# Patient Record
Sex: MALE
[De-identification: names, ages, dates, MRNs, and addresses within clinical notes are randomized per-mention and may not be internally consistent; named-entity substitution may affect disease eponyms.]

---

## 2019-02-04 ENCOUNTER — HOSPITAL ENCOUNTER (INPATIENT)
Dept: HOSPITAL 42 - ED | Age: 69
LOS: 9 days | Discharge: NURSING, REHAB OR LONG-TERM CARE | DRG: 659 | End: 2019-02-13
Attending: INTERNAL MEDICINE | Admitting: INTERNAL MEDICINE
Payer: MEDICARE

## 2019-02-04 VITALS — BODY MASS INDEX: 33.2 KG/M2

## 2019-02-04 DIAGNOSIS — D47.2: ICD-10-CM

## 2019-02-04 DIAGNOSIS — Z87.891: ICD-10-CM

## 2019-02-04 DIAGNOSIS — D63.1: ICD-10-CM

## 2019-02-04 DIAGNOSIS — Z96.643: ICD-10-CM

## 2019-02-04 DIAGNOSIS — E86.0: ICD-10-CM

## 2019-02-04 DIAGNOSIS — N17.0: Primary | ICD-10-CM

## 2019-02-04 DIAGNOSIS — I50.33: ICD-10-CM

## 2019-02-04 DIAGNOSIS — E87.4: ICD-10-CM

## 2019-02-04 DIAGNOSIS — E83.39: ICD-10-CM

## 2019-02-04 DIAGNOSIS — M16.0: ICD-10-CM

## 2019-02-04 DIAGNOSIS — E55.9: ICD-10-CM

## 2019-02-04 DIAGNOSIS — Z99.2: ICD-10-CM

## 2019-02-04 DIAGNOSIS — N18.3: ICD-10-CM

## 2019-02-04 DIAGNOSIS — I44.0: ICD-10-CM

## 2019-02-04 DIAGNOSIS — I13.0: ICD-10-CM

## 2019-02-04 DIAGNOSIS — E87.5: ICD-10-CM

## 2019-02-04 DIAGNOSIS — R09.02: ICD-10-CM

## 2019-02-04 DIAGNOSIS — N13.2: ICD-10-CM

## 2019-02-04 DIAGNOSIS — D50.9: ICD-10-CM

## 2019-02-04 DIAGNOSIS — R65.10: ICD-10-CM

## 2019-02-04 DIAGNOSIS — N25.81: ICD-10-CM

## 2019-02-04 DIAGNOSIS — E66.9: ICD-10-CM

## 2019-02-04 DIAGNOSIS — E87.6: ICD-10-CM

## 2019-02-04 LAB
ALBUMIN SERPL-MCNC: 3.6 G/DL (ref 3–4.8)
ALBUMIN SERPL-MCNC: 3.8 G/DL (ref 3–4.8)
ALBUMIN/GLOB SERPL: 1 {RATIO} (ref 1.1–1.8)
ALBUMIN/GLOB SERPL: 1 {RATIO} (ref 1.1–1.8)
ALT SERPL-CCNC: 23 U/L (ref 7–56)
ALT SERPL-CCNC: 26 U/L (ref 7–56)
APPEARANCE UR: (no result)
APTT BLD: 38.1 SECONDS (ref 26.9–38.3)
ARTERIAL BLOOD GAS HEMOGLOBIN: 8.7 G/DL (ref 11.7–17.4)
ARTERIAL BLOOD GAS O2 SAT: 96.7 % (ref 95–98)
ARTERIAL BLOOD GAS PCO2: 13 MM/HG (ref 35–45)
ARTERIAL BLOOD GAS TCO2: 3.5 MMOL.L (ref 22–28)
AST SERPL-CCNC: 29 U/L (ref 17–59)
AST SERPL-CCNC: 34 U/L (ref 17–59)
BACTERIA #/AREA URNS HPF: (no result) /HPF
BASE EXCESS BLDV CALC-SCNC: -27.2 MMOL/L (ref 0–2)
BASE EXCESS BLDV CALC-SCNC: -27.7 MMOL/L (ref 0–2)
BASOPHILS # BLD AUTO: 0.03 K/MM3 (ref 0–2)
BASOPHILS # BLD AUTO: 0.05 K/MM3 (ref 0–2)
BASOPHILS NFR BLD: 0.1 % (ref 0–3)
BASOPHILS NFR BLD: 0.2 % (ref 0–3)
BILIRUB UR-MCNC: NEGATIVE MG/DL
BNP SERPL-MCNC: (no result) PG/ML (ref 0–450)
BUN SERPL-MCNC: 230 MG/DL (ref 7–21)
BUN SERPL-MCNC: 232 MG/DL (ref 7–21)
CALCIUM SERPL-MCNC: 6.7 MG/DL (ref 8.4–10.5)
CALCIUM SERPL-MCNC: 7 MG/DL (ref 8.4–10.5)
CK MB SERPL-MCNC: 25 NG/ML (ref 0–3.6)
CK MB%: 5.2 % (ref 2.5–3)
COLOR UR: (no result)
EOSINOPHIL # BLD: 0 10*3/UL (ref 0–0.7)
EOSINOPHIL # BLD: 0 10*3/UL (ref 0–0.7)
EOSINOPHIL NFR BLD: 0 % (ref 1.5–5)
EOSINOPHIL NFR BLD: 0.2 % (ref 1.5–5)
EPI CELLS #/AREA URNS HPF: (no result) /HPF (ref 0–5)
ERYTHROCYTE [DISTWIDTH] IN BLOOD BY AUTOMATED COUNT: 14.5 % (ref 11.5–14.5)
ERYTHROCYTE [DISTWIDTH] IN BLOOD BY AUTOMATED COUNT: 14.6 % (ref 11.5–14.5)
GFR NON-AFRICAN AMERICAN: 3
GFR NON-AFRICAN AMERICAN: 3
GLUCOSE UR STRIP-MCNC: NEGATIVE MG/DL
HCO3 BLDA-SCNC: 3.1 MMOL/L (ref 21–28)
HGB BLD-MCNC: 8.6 G/DL (ref 14–18)
HGB BLD-MCNC: 9 G/DL (ref 14–18)
INHALED O2 CONCENTRATION: 28 %
INR PPP: 1.29
LEUKOCYTE ESTERASE UR-ACNC: (no result) LEU/UL
LYMPHOCYTE: 4 % (ref 22–35)
LYMPHOCYTES # BLD: 1 10*3/UL (ref 1.2–3.4)
LYMPHOCYTES # BLD: 1.6 10*3/UL (ref 1.2–3.4)
LYMPHOCYTES NFR BLD AUTO: 4.4 % (ref 22–35)
LYMPHOCYTES NFR BLD AUTO: 6.7 % (ref 22–35)
MCH RBC QN AUTO: 28.3 PG (ref 25–35)
MCH RBC QN AUTO: 28.5 PG (ref 25–35)
MCHC RBC AUTO-ENTMCNC: 34.1 G/DL (ref 31–37)
MCHC RBC AUTO-ENTMCNC: 34.4 G/DL (ref 31–37)
MCV RBC AUTO: 82.9 FL (ref 80–105)
MCV RBC AUTO: 82.9 FL (ref 80–105)
METAMYELOCYTES NFR BLD: 1 %
MONOCYTE: 6 % (ref 1–6)
MONOCYTES # BLD AUTO: 0.4 10*3/UL (ref 0.1–0.6)
MONOCYTES # BLD AUTO: 0.4 10*3/UL (ref 0.1–0.6)
MONOCYTES NFR BLD: 1.5 % (ref 1–6)
MONOCYTES NFR BLD: 1.6 % (ref 1–6)
NEUTROPHILS NFR BLD AUTO: 85 % (ref 50–70)
NEUTS BAND NFR BLD: 4 % (ref 0–2)
O2 CAP BLDA-SCNC: 12.4 ML/DL (ref 16–24)
O2 CT BLDA-SCNC: 12 ML/DL (ref 15–23)
PH BLDA: 6.98 [PH] (ref 7.35–7.45)
PH BLDV: 6.93 [PH] (ref 7.32–7.43)
PH BLDV: 6.94 [PH] (ref 7.32–7.43)
PH UR STRIP: 6 [PH] (ref 4.7–8)
PLATELET # BLD EST: NORMAL 10*3/UL
PLATELET # BLD: 264 10^3/UL (ref 120–450)
PLATELET # BLD: 268 10^3/UL (ref 120–450)
PMV BLD AUTO: 9.5 FL (ref 7–11)
PMV BLD AUTO: 9.7 FL (ref 7–11)
PO2 BLDA: 142 MM/HG (ref 80–100)
PROT UR STRIP-MCNC: (no result) MG/DL
PROTHROMBIN TIME: 14.3 SECONDS (ref 9.4–12.5)
RBC # BLD AUTO: 3.04 10^6/UL (ref 3.5–6.1)
RBC # BLD AUTO: 3.16 10^6/UL (ref 3.5–6.1)
RBC # UR STRIP: (no result) /UL
SP GR UR STRIP: 1.02 (ref 1–1.03)
TROPONIN I SERPL-MCNC: 0.11 NG/ML
UROBILINOGEN UR STRIP-ACNC: 0.2 E.U./DL
VENOUS BLOOD FIO2: 21 %
VENOUS BLOOD FIO2: 21 %
VENOUS BLOOD GAS PCO2: 16 (ref 40–60)
VENOUS BLOOD GAS PCO2: 17 (ref 40–60)
VENOUS BLOOD GAS PO2: 110 MM/HG (ref 30–55)
VENOUS BLOOD GAS PO2: 215 MM/HG (ref 30–55)
WBC # BLD AUTO: 21.9 10^3/UL (ref 4.5–11)
WBC # BLD AUTO: 23.3 10^3/UL (ref 4.5–11)
WBC #/AREA URNS HPF: (no result) /HPF (ref 0–6)

## 2019-02-04 RX ADMIN — CEFEPIME SCH MLS/HR: 1 INJECTION, SOLUTION INTRAVENOUS at 20:27

## 2019-02-04 NOTE — CP.PCM.CON
<JaysonAnastasia - Last Filed: 02/04/19 17:06>





History of Present Illness





- History of Present Illness


History of Present Illness: 


Anastasia Tyler, PGY-1, ICU Consult Note for Dr. Sow





68 year old male with past medical history of arthritis and is new to [facility] 

presents with shortness of breath for 7-10 days. Patient was found by EMS at 

home with shortness of breath and was hypoxic with O2 saturation of 90% on room 

air. No medications were given prior to patient arrival at ER. Patient has not 

risen from his bed due to the difficulty of getting out of bed since November. 

He ambulates with a walker s/p hip surgery 3 years ago. Patient subsequently 

developed shortness of breath for 7-10 days but patient denied cough, sputum, 

fever, or chills. Patient last urinated this AM. But subsequently, he had the 

urge to urinate but was unable to urinate. He has not urinated since he arrived 

at the hospital. Patient also reports undescribable chest pain for 3 days in his

middle upper chest. Patient is unable to describe the pain or explain what 

improves or worsens the pain. Patient denies diaphoresis, left arm pain, and jaw

pain. He reports nausea and nonbloody, nonbilious vomitingx2 yesterday. Patient 

reports abdominal pain for the past 3 days but denies constipation or diarrhea. 

Patient started eating bur[employer] rio hamburgers prior to abdominal pain onset. 

Patient denies fever, chills, headache, heart palpitations, dysuria, hematuria, 

bilateral lower extremity swelling. 12-point ROS was unremarkable except for 

what was mentioned above in HPI.





BUN/Cr upon presentation was 232/18.1. Potassium was 5.7, Sodium was 131, ABG pH

was 6.98, and ABG pCO2 was 13





PMH: as described above


PSH: hip surgery 3 years ago


FMHx: Mother: ESRD on dialysis


SHx: smoked 1 PPD for 40 years with last cigarette in 2012, prior history of 

alcohol use, no history of recreational drug use


Allergies: NKDA





PMD: Dr. Marshall


Home medications: OTC Allieve and OTC steroid cream for chronic leg rash








Review of Systems





- Review of Systems


Review of Systems: 


except as mentioned in the HPI








Past Patient History





- Past Social History


Smoking Status: Never Smoked





- MUSCULOSKELETAL/RHEUMATOLOGICAL


Hx Arthritis: Yes





- PSYCHIATRIC


Hx Substance Use: No





- ANESTHESIA


Hx Anesthesia: No


Hx Anesthesia Reactions: No


Hx Malignant Hyperthermia: No





Meds


Allergies/Adverse Reactions: 


                                    Allergies











Allergy/AdvReac Type Severity Reaction Status Date / Time


 


No Known Allergies Allergy   Verified 02/04/19 19:36














- Medications


Medications: 


                               Current Medications





Vancomycin HCl (Vancomycin 1gm)  1 gm in 250 mls @ 167 mls/hr IVPB STAT STA; 

Protocol


   Stop: 02/04/19 16:58


Sodium Chloride (Sodium Chloride 0.9%)  1,000 mls @ 999 mls/hr IV .Q1H1M STA


   Stop: 02/04/19 17:04


Calcium Gluconate 1,000 mg/ (Dextrose)  110 mls @ 110 mls/hr IVPB ONCE ONE


   Stop: 02/04/19 17:04











Physical Exam





- Constitutional


Appears: Well, Non-toxic, No Acute Distress





- Head Exam


Head Exam: ATRAUMATIC, NORMAL INSPECTION, NORMOCEPHALIC





- Eye Exam


Eye Exam: EOMI, PERRL





- ENT Exam


ENT Exam: Mucous Membranes Moist





- Respiratory Exam


Respiratory Exam: Wheezes (mild diffuse), Respiratory Distress (tachypneic)





- Cardiovascular Exam


Cardiovascular Exam: REGULAR RHYTHM, RRR





- GI/Abdominal Exam


GI & Abdominal Exam: Normal Bowel Sounds, Soft.  absent: Distended, Tenderness





- Extremities Exam


Extremities exam: Positive for: full ROM





- Neurological Exam


Neurological exam: Alert, CN II-XII Intact, Oriented x3





- Psychiatric Exam


Psychiatric exam: Normal Affect, Normal Mood





- Skin


Additional comments: 


chronic right leg rash with flaky brown lesions








Results





- Vital Signs


Recent Vital Signs: 


                                Last Vital Signs











Temp      


 


Pulse      


 


Resp  22   02/04/19 15:01


 


BP      


 


Pulse Ox  100   02/04/19 15:01














- Labs


Result Diagrams: 


                                 02/04/19 14:50





                                 02/04/19 14:50


Labs: 


                         Laboratory Results - last 24 hr











  02/04/19 02/04/19 02/04/19





  14:50 14:50 14:50


 


WBC  23.3 H  


 


RBC  3.16 L  


 


Hgb  9.0 L  


 


Hct  26.2 L  


 


MCV  82.9  


 


MCH  28.5  


 


MCHC  34.4  


 


RDW  14.5  


 


Plt Count  264  


 


MPV  9.5  


 


Neut % (Auto)  91.4 H  


 


Lymph % (Auto)  6.7 L  


 


Mono % (Auto)  1.5  


 


Eos % (Auto)  0.2 L  


 


Baso % (Auto)  0.2  


 


Lymph # (Auto)  1.6  


 


Mono # (Auto)  0.4  


 


Eos # (Auto)  0.0  


 


Baso # (Auto)  0.05  


 


Absolute Neuts (auto)  21.29 H  


 


Neutrophils % (Manual)  85 H  


 


Band Neutrophils %  4 H  


 


Lymphocytes % (Manual)  4 L  


 


Monocytes % (Manual)  6  


 


Metamyelocytes %  1  


 


Platelet Evaluation  Normal  


 


PT   14.3 H 


 


INR   1.29 


 


APTT   38.1 


 


D-Dimer, Quantitative   5760 H 


 


pCO2   


 


pO2    215 H


 


HCO3   


 


ABG pH   


 


ABG Total CO2   


 


ABG O2 Saturation   


 


ABG O2 Content   


 


ABG Base Excess   


 


ABG Hemoglobin   


 


ABG Carboxyhemoglobin   


 


POC ABG HHb (Measured)   


 


ABG Methemoglobin   


 


ABG O2 Capacity   


 


VBG pH    6.94 L*


 


VBG pCO2    17.0 L*


 


VBG HCO3    3.7 L


 


VBG Total CO2    4.2 L


 


VBG O2 Sat (Calc)    96.8 H


 


VBG Base Excess    -27.2 L


 


VBG Potassium    5.6 H


 


Hgb O2 Saturation   


 


Sodium    125.0 L


 


Chloride    94.0 L


 


Glucose    140 H


 


Lactate    1.2


 


FiO2    21.0


 


Blood Gas Comments   


 


Crit Value Called To    Kasey ramírez


 


Crit Value Called By    Atc


 


Blood Gas Notified Time    1505


 


Potassium   


 


Carbon Dioxide   


 


Anion Gap   


 


BUN   


 


Creatinine   


 


Est GFR ( Amer)   


 


Est GFR (Non-Af Amer)   


 


Random Glucose   


 


Calcium   


 


Magnesium   


 


Total Bilirubin   


 


AST   


 


ALT   


 


Alkaline Phosphatase   


 


Total Creatine Kinase   


 


CK-MB (CK-2)   


 


CK-MB (CK-2) %   


 


NT-Pro-B Natriuret Pep   


 


Total Protein   


 


Albumin   


 


Globulin   


 


Albumin/Globulin Ratio   


 


Venous Blood Potassium    5.6 H














  02/04/19 02/04/19





  14:50 15:15


 


WBC  


 


RBC  


 


Hgb  


 


Hct  


 


MCV  


 


MCH  


 


MCHC  


 


RDW  


 


Plt Count  


 


MPV  


 


Neut % (Auto)  


 


Lymph % (Auto)  


 


Mono % (Auto)  


 


Eos % (Auto)  


 


Baso % (Auto)  


 


Lymph # (Auto)  


 


Mono # (Auto)  


 


Eos # (Auto)  


 


Baso # (Auto)  


 


Absolute Neuts (auto)  


 


Neutrophils % (Manual)  


 


Band Neutrophils %  


 


Lymphocytes % (Manual)  


 


Monocytes % (Manual)  


 


Metamyelocytes %  


 


Platelet Evaluation  


 


PT  


 


INR  


 


APTT  


 


D-Dimer, Quantitative  


 


pCO2   13 L*


 


pO2   142.0 H


 


HCO3   3.1 L*


 


ABG pH   6.98 L*


 


ABG Total CO2   3.5 L


 


ABG O2 Saturation   96.7


 


ABG O2 Content   12.0 L


 


ABG Base Excess   -26.5 L


 


ABG Hemoglobin   8.7 L


 


ABG Carboxyhemoglobin   0 L


 


POC ABG HHb (Measured)   3.3


 


ABG Methemoglobin   1.0


 


ABG O2 Capacity   12.4 L


 


VBG pH  


 


VBG pCO2  


 


VBG HCO3  


 


VBG Total CO2  


 


VBG O2 Sat (Calc)  


 


VBG Base Excess  


 


VBG Potassium  


 


Hgb O2 Saturation   95.6


 


Sodium  131 L 


 


Chloride  96 L 


 


Glucose  


 


Lactate  


 


FiO2   28.0


 


Blood Gas Comments   Walked to er md


 


Crit Value Called To   harish Nunn


 


Crit Value Called By   Ec


 


Blood Gas Notified Time   1522


 


Potassium  5.7 H* 


 


Carbon Dioxide  5 L 


 


Anion Gap  36 H 


 


BUN  232 H* 


 


Creatinine  18.1 H* 


 


Est GFR ( Amer)  3 


 


Est GFR (Non-Af Amer)  3 


 


Random Glucose  136 H 


 


Calcium  7.0 L 


 


Magnesium  1.9 


 


Total Bilirubin  0.8 


 


AST  34 


 


ALT  23 


 


Alkaline Phosphatase  109 


 


Total Creatine Kinase  477 H 


 


CK-MB (CK-2)  25.0 H 


 


CK-MB (CK-2) %  5.2 H 


 


NT-Pro-B Natriuret Pep  67055 H 


 


Total Protein  7.7 


 


Albumin  3.8 


 


Globulin  4.0 


 


Albumin/Globulin Ratio  1.0 L 


 


Venous Blood Potassium  














Assessment & Plan





- Assessment and Plan (Free Text)


Assessment: 


68 year old male with past medical history of arthritis presents with acute 

renal failure like 2/2 to rhabdomyolysis vs. acute tubular necrosis from 

dehydration. Patient is currently receiving IV fluids and sodium bicarbonate 

drip due to renal failure and severe anion gap metabolic acidosis with 

appropriate compensation of respiratory alkalosis.





Plan: 


Neuro: 


  


-AAOx3, no FND, moving extremities past midline.   


-Monitor neuro status.    


-Reorient patient as necessary.   





Cardio:   


Elevated BNP 2/2 to CHF vs. pulmonary etiology


-BNP: 73921


-EKG: NSR with 1st degree AV block


-Echocardiogram: pending


-Troponinx3: pending


-Maintain MAP>65.    


-Monitor for S/S, HD compromise.   





Pulm: 


Tachypnea due to metabolic acidosis


-ABG: pH: 6.98, pO2: 142, pCO2: 13


-CXR: no active disease


-Maintain O2 saturation>90%.   


-Head of bed to 30 degrees  


-Conservative fluid management  


-Maintain oral hygiene  


History of tobacco abuse


-Duonebs Q2 PRN for shortness of breath





GI:   


Diet 


-NPO


GI prophylaxis  


-Protonix 40 mg daily





/Nephro:   


Acute Renal Failure 2/2 to Rhabdomyolysis vs. Acute Tubular Necrosis


-BUN/Cr elevated at 232/18.1 with unknown baseline creatinine


-CK: elevated 477


-No urine output at this time


-Strict I and O with jovel. Measure daily weight


-2 L of NS already administered


-Administer 1 more L of NS bolus


-Administer Sodium bicarbonate drip with 1 amp


-As per Dr. Cardoso, if patient's creatinine does not respond appropriately and 

patient continues to be anuric, he will consider dialysis for tomorrow.


-Follow up reevaluation of CMP at 19:00


-Replete electrolytes as needed.   


-Maintain euvolemia.   


Hyperkalemia


-K: 5.7


-Calcium gluconate, kayexalate, 1 amp of sodium bicarbonate, and insulin 10 U 

with D50 bolus





Endocrinology:   





-Random glucose: 136


-Maintain euglycemia.   





Heme/Onc:   


Anemia


-H/H stable at  9/26.2


-Will check CBC at 19:00 due to likely dehydrated status


-No signs of HD compromise.   


-Continue monitoring H/H  


Elevated D-dimer


-Rule out DVT with bilateral lower extremity ultrasound


-Avoid CTA to evaluate for PE due to acute renal failure


-Avoid V/Q scan to evaluate for PE due to current tachypnea. 


DVT prophylaxis  


-heparin 5000 U Q8





ID:   





-Afebrile, leukocytosis at 23.3


-Blood culture: follow up


-Urine Culture: follow up


-Procalcitonin: follow up


-Lactate: 1.2


-Given one dose of vancomycin and zosyn in the emergency department.


-Monitor for signs and symptoms of infection.   





Patient seen and examined with Dr. Sow.








- Date & Time


Date: 02/04/19


Time: 17:16





<Walt Sow - Last Filed: 02/05/19 09:59>





Meds





- Medications


Medications: 


                               Current Medications





Albuterol/Ipratropium (Duoneb 3 Mg/0.5 Mg (3 Ml) Ud)  3 ml IH Q2H PRN


   PRN Reason: Shortness of Breath


Aspirin (Aspirin Chewable)  81 mg PO DAILY [facility]


   Last Admin: 02/05/19 09:27 Dose:  81 mg


Atorvastatin Calcium (Lipitor)  40 mg PO DIN [facility]


Calcium Acetate (Phoslo)  2,001 mg PO WM [facility]


Darbepoetin Balta (Aranesp)  100 mcg IVP QWK [facility]


Heparin Sodium (Porcine) (Heparin)  5,000 units SC Q8 [facility]; Protocol


   Last Admin: 02/05/19 05:02 Dose:  5,000 units


Sodium Bicarbonate 150 meq/ (Sodium Chloride)  1,150 mls @ 150 mls/hr IV .Q7H40M

[facility]


   Last Admin: 02/05/19 09:43 Dose:  150 mls/hr


Cefepime HCl (Maxipime 1gm)  1 gm in 100 mls @ 100 mls/hr IVPB Q24H [facility]; 

Protocol


   Last Admin: 02/04/19 20:27 Dose:  100 mls/hr


Pantoprazole Sodium (Protonix Inj)  40 mg IVP DAILY [facility]


   Last Admin: 02/05/19 09:02 Dose:  40 mg


Vitamin B Complex/Vit C/Folic Acid (Nephro-Seda)  1 tab PO 0800 [facility]











Results





- Vital Signs


Recent Vital Signs: 


                                Last Vital Signs











Temp  96.6 F L  02/04/19 23:16


 


Pulse  108 H  02/05/19 06:00


 


Resp  23   02/04/19 22:12


 


BP  129/61   02/04/19 17:58


 


Pulse Ox  98   02/04/19 17:58














- Labs


Result Diagrams: 


                                 02/05/19 05:40





                                 02/05/19 05:40


Labs: 


                         Laboratory Results - last 24 hr











  02/04/19 02/04/19 02/04/19





  14:50 14:50 14:50


 


WBC  23.3 H  


 


RBC  3.16 L  


 


Hgb  9.0 L  


 


Hct  26.2 L  


 


MCV  82.9  


 


MCH  28.5  


 


MCHC  34.4  


 


RDW  14.5  


 


Plt Count  264  


 


MPV  9.5  


 


Neut % (Auto)  91.4 H  


 


Lymph % (Auto)  6.7 L  


 


Mono % (Auto)  1.5  


 


Eos % (Auto)  0.2 L  


 


Baso % (Auto)  0.2  


 


Lymph # (Auto)  1.6  


 


Mono # (Auto)  0.4  


 


Eos # (Auto)  0.0  


 


Baso # (Auto)  0.05  


 


Absolute Neuts (auto)  21.29 H  


 


Neutrophils % (Manual)  85 H  


 


Band Neutrophils %  4 H  


 


Lymphocytes % (Manual)  4 L  


 


Monocytes % (Manual)  6  


 


Metamyelocytes %  1  


 


Platelet Evaluation  Normal  


 


PT   14.3 H 


 


INR   1.29 


 


APTT   38.1 


 


D-Dimer, Quantitative   5760 H 


 


pCO2   


 


pO2    215 H


 


HCO3   


 


ABG pH   


 


ABG Total CO2   


 


ABG O2 Saturation   


 


ABG O2 Content   


 


ABG Base Excess   


 


ABG Hemoglobin   


 


ABG Carboxyhemoglobin   


 


POC ABG HHb (Measured)   


 


ABG Methemoglobin   


 


ABG O2 Capacity   


 


VBG pH    6.94 L*


 


VBG pCO2    17.0 L*


 


VBG HCO3    3.7 L


 


VBG Total CO2    4.2 L


 


VBG O2 Sat (Calc)    96.8 H


 


VBG Base Excess    -27.2 L


 


VBG Potassium    5.6 H


 


Hgb O2 Saturation   


 


Sodium    125.0 L


 


Chloride    94.0 L


 


Glucose    140 H


 


Lactate    1.2


 


FiO2    21.0


 


Blood Gas Comments   


 


Crit Value Called To    Kasey ramírez


 


Crit Value Called By    Atc


 


Blood Gas Notified Time    1505


 


Potassium   


 


Carbon Dioxide   


 


Anion Gap   


 


BUN   


 


Creatinine   


 


Est GFR ( Amer)   


 


Est GFR (Non-Af Amer)   


 


Random Glucose   


 


Calcium   


 


Phosphorus   


 


Magnesium   


 


Total Bilirubin   


 


AST   


 


ALT   


 


Alkaline Phosphatase   


 


Total Creatine Kinase   


 


CK-MB (CK-2)   


 


CK-MB (CK-2) %   


 


Troponin I   


 


NT-Pro-B Natriuret Pep   


 


Total Protein   


 


Albumin   


 


Globulin   


 


Albumin/Globulin Ratio   


 


Procalcitonin   


 


Venous Blood Potassium    5.6 H


 


Urine Color   


 


Urine Appearance   


 


Urine pH   


 


Ur Specific Gravity   


 


Urine Protein   


 


Urine Glucose (UA)   


 


Urine Ketones   


 


Urine Blood   


 


Urine Nitrate   


 


Urine Bilirubin   


 


Urine Urobilinogen   


 


Ur Leukocyte Esterase   


 


Urine RBC   


 


Urine WBC   


 


Ur Epithelial Cells   


 


Urine Bacteria   














  02/04/19 02/04/19 02/04/19





  14:50 15:15 17:56


 


WBC   


 


RBC   


 


Hgb   


 


Hct   


 


MCV   


 


MCH   


 


MCHC   


 


RDW   


 


Plt Count   


 


MPV   


 


Neut % (Auto)   


 


Lymph % (Auto)   


 


Mono % (Auto)   


 


Eos % (Auto)   


 


Baso % (Auto)   


 


Lymph # (Auto)   


 


Mono # (Auto)   


 


Eos # (Auto)   


 


Baso # (Auto)   


 


Absolute Neuts (auto)   


 


Neutrophils % (Manual)   


 


Band Neutrophils %   


 


Lymphocytes % (Manual)   


 


Monocytes % (Manual)   


 


Metamyelocytes %   


 


Platelet Evaluation   


 


PT   


 


INR   


 


APTT   


 


D-Dimer, Quantitative   


 


pCO2   13 L* 


 


pO2   142.0 H 


 


HCO3   3.1 L* 


 


ABG pH   6.98 L* 


 


ABG Total CO2   3.5 L 


 


ABG O2 Saturation   96.7 


 


ABG O2 Content   12.0 L 


 


ABG Base Excess   -26.5 L 


 


ABG Hemoglobin   8.7 L 


 


ABG Carboxyhemoglobin   0 L 


 


POC ABG HHb (Measured)   3.3 


 


ABG Methemoglobin   1.0 


 


ABG O2 Capacity   12.4 L 


 


VBG pH   


 


VBG pCO2   


 


VBG HCO3   


 


VBG Total CO2   


 


VBG O2 Sat (Calc)   


 


VBG Base Excess   


 


VBG Potassium   


 


Hgb O2 Saturation   95.6 


 


Sodium  131 L   131 L


 


Chloride  96 L   97 L


 


Glucose   


 


Lactate   


 


FiO2   28.0 


 


Blood Gas Comments   Walked to er md 


 


Crit Value Called To   harish Nunn 


 


Crit Value Called By   Ec 


 


Blood Gas Notified Time   1522 


 


Potassium  5.7 H*   5.4 H


 


Carbon Dioxide  5 L   < 5 L


 


Anion Gap  36 H   34 H


 


BUN  232 H*   230 H*


 


Creatinine  18.1 H*   18.3 H*


 


Est GFR ( Amer)  3   3


 


Est GFR (Non-Af Amer)  3   3


 


Random Glucose  136 H   211 H


 


Calcium  7.0 L   6.7 L*


 


Phosphorus    13.4 H


 


Magnesium  1.9   2.0


 


Total Bilirubin  0.8   0.9


 


AST  34   29


 


ALT  23   26


 


Alkaline Phosphatase  109   105


 


Total Creatine Kinase  477 H  


 


CK-MB (CK-2)  25.0 H  


 


CK-MB (CK-2) %  5.2 H  


 


Troponin I    0.11


 


NT-Pro-B Natriuret Pep  76954 H  


 


Total Protein  7.7   7.3


 


Albumin  3.8   3.6


 


Globulin  4.0   3.7


 


Albumin/Globulin Ratio  1.0 L   1.0 L


 


Procalcitonin   


 


Venous Blood Potassium   


 


Urine Color   


 


Urine Appearance   


 


Urine pH   


 


Ur Specific Gravity   


 


Urine Protein   


 


Urine Glucose (UA)   


 


Urine Ketones   


 


Urine Blood   


 


Urine Nitrate   


 


Urine Bilirubin   


 


Urine Urobilinogen   


 


Ur Leukocyte Esterase   


 


Urine RBC   


 


Urine WBC   


 


Ur Epithelial Cells   


 


Urine Bacteria   














  02/04/19 02/04/19 02/04/19





  18:00 18:14 18:14


 


WBC    21.9 H


 


RBC    3.04 L


 


Hgb    8.6 L


 


Hct    25.2 L


 


MCV    82.9


 


MCH    28.3


 


MCHC    34.1


 


RDW    14.6 H


 


Plt Count    268


 


MPV    9.7


 


Neut % (Auto)    93.9 H


 


Lymph % (Auto)    4.4 L


 


Mono % (Auto)    1.6


 


Eos % (Auto)    0.0 L


 


Baso % (Auto)    0.1


 


Lymph # (Auto)    1.0 L


 


Mono # (Auto)    0.4


 


Eos # (Auto)    0.0


 


Baso # (Auto)    0.03


 


Absolute Neuts (auto)    20.56 H


 


Neutrophils % (Manual)   


 


Band Neutrophils %   


 


Lymphocytes % (Manual)   


 


Monocytes % (Manual)   


 


Metamyelocytes %   


 


Platelet Evaluation   


 


PT   


 


INR   


 


APTT   


 


D-Dimer, Quantitative   


 


pCO2   


 


pO2   110 H 


 


HCO3   


 


ABG pH   


 


ABG Total CO2   


 


ABG O2 Saturation   


 


ABG O2 Content   


 


ABG Base Excess   


 


ABG Hemoglobin   


 


ABG Carboxyhemoglobin   


 


POC ABG HHb (Measured)   


 


ABG Methemoglobin   


 


ABG O2 Capacity   


 


VBG pH   6.93 L* 


 


VBG pCO2   16.0 L* 


 


VBG HCO3   3.4 L 


 


VBG Total CO2   3.9 L 


 


VBG O2 Sat (Calc)   96.2 H 


 


VBG Base Excess   -27.7 L 


 


VBG Potassium   5.6 H 


 


Hgb O2 Saturation   


 


Sodium   126.0 L 


 


Chloride   95.0 L 


 


Glucose   215 H 


 


Lactate   1.4 


 


FiO2   21.0 


 


Blood Gas Comments   


 


Crit Value Called To   Karma victoria 


 


Crit Value Called By   [facility] 


 


Blood Gas Notified Time   1825 


 


Potassium   


 


Carbon Dioxide   


 


Anion Gap   


 


BUN   


 


Creatinine   


 


Est GFR ( Amer)   


 


Est GFR (Non-Af Amer)   


 


Random Glucose   


 


Calcium   


 


Phosphorus   


 


Magnesium   


 


Total Bilirubin   


 


AST   


 


ALT   


 


Alkaline Phosphatase   


 


Total Creatine Kinase   


 


CK-MB (CK-2)   


 


CK-MB (CK-2) %   


 


Troponin I   


 


NT-Pro-B Natriuret Pep   


 


Total Protein   


 


Albumin   


 


Globulin   


 


Albumin/Globulin Ratio   


 


Procalcitonin  1.89 H  


 


Venous Blood Potassium   5.6 H 


 


Urine Color   


 


Urine Appearance   


 


Urine pH   


 


Ur Specific Gravity   


 


Urine Protein   


 


Urine Glucose (UA)   


 


Urine Ketones   


 


Urine Blood   


 


Urine Nitrate   


 


Urine Bilirubin   


 


Urine Urobilinogen   


 


Ur Leukocyte Esterase   


 


Urine RBC   


 


Urine WBC   


 


Ur Epithelial Cells   


 


Urine Bacteria   














  02/04/19 02/04/19 02/05/19





  22:42 23:15 05:40


 


WBC   


 


RBC   


 


Hgb   


 


Hct   


 


MCV   


 


MCH   


 


MCHC   


 


RDW   


 


Plt Count   


 


MPV   


 


Neut % (Auto)   


 


Lymph % (Auto)   


 


Mono % (Auto)   


 


Eos % (Auto)   


 


Baso % (Auto)   


 


Lymph # (Auto)   


 


Mono # (Auto)   


 


Eos # (Auto)   


 


Baso # (Auto)   


 


Absolute Neuts (auto)   


 


Neutrophils % (Manual)   


 


Band Neutrophils %   


 


Lymphocytes % (Manual)   


 


Monocytes % (Manual)   


 


Metamyelocytes %   


 


Platelet Evaluation   


 


PT   


 


INR   


 


APTT   


 


D-Dimer, Quantitative   


 


pCO2   


 


pO2   


 


HCO3   


 


ABG pH   


 


ABG Total CO2   


 


ABG O2 Saturation   


 


ABG O2 Content   


 


ABG Base Excess   


 


ABG Hemoglobin   


 


ABG Carboxyhemoglobin   


 


POC ABG HHb (Measured)   


 


ABG Methemoglobin   


 


ABG O2 Capacity   


 


VBG pH   


 


VBG pCO2   


 


VBG HCO3   


 


VBG Total CO2   


 


VBG O2 Sat (Calc)   


 


VBG Base Excess   


 


VBG Potassium   


 


Hgb O2 Saturation   


 


Sodium    136


 


Chloride    103


 


Glucose   


 


Lactate   


 


FiO2   


 


Blood Gas Comments   


 


Crit Value Called To   


 


Crit Value Called By   


 


Blood Gas Notified Time   


 


Potassium    4.7


 


Carbon Dioxide    7 L D


 


Anion Gap    30 H


 


BUN    234 H*


 


Creatinine    16.6 H*


 


Est GFR ( Amer)    4


 


Est GFR (Non-Af Amer)    3


 


Random Glucose    155 H


 


Calcium    6.5 L*


 


Phosphorus    11.9 H


 


Magnesium    1.7


 


Total Bilirubin    0.8


 


AST    39


 


ALT    23


 


Alkaline Phosphatase    86


 


Total Creatine Kinase   


 


CK-MB (CK-2)   


 


CK-MB (CK-2) %   


 


Troponin I   0.27 H* D  0.95 H* D


 


NT-Pro-B Natriuret Pep   


 


Total Protein    6.6


 


Albumin    3.3


 


Globulin    3.4


 


Albumin/Globulin Ratio    1.0 L


 


Procalcitonin   


 


Venous Blood Potassium   


 


Urine Color  Light yellow  


 


Urine Appearance  Sl cloudy  


 


Urine pH  6.0  


 


Ur Specific Gravity  1.020  


 


Urine Protein  Trace H  


 


Urine Glucose (UA)  Negative  


 


Urine Ketones  Negative  


 


Urine Blood  Moderate H  


 


Urine Nitrate  Negative  


 


Urine Bilirubin  Negative  


 


Urine Urobilinogen  0.2  


 


Ur Leukocyte Esterase  Moderate H  


 


Urine RBC  2 - 5 H  


 


Urine WBC  25 - 30 H  


 


Ur Epithelial Cells  0 - 2  


 


Urine Bacteria  Few  














  02/05/19 02/05/19





  05:40 06:20


 


WBC  12.3 H D 


 


RBC  2.76 L 


 


Hgb  7.7 L 


 


Hct  22.2 L 


 


MCV  80.4 


 


MCH  27.9 


 


MCHC  34.7 


 


RDW  14.3 


 


Plt Count  239 


 


MPV  9.4 


 


Neut % (Auto)  95.8 H 


 


Lymph % (Auto)  3.4 L 


 


Mono % (Auto)  0.7 L 


 


Eos % (Auto)  0.1 L 


 


Baso % (Auto)  0.0 


 


Lymph # (Auto)  0.4 L 


 


Mono # (Auto)  0.1 


 


Eos # (Auto)  0.0 


 


Baso # (Auto)  0.00 


 


Absolute Neuts (auto)  11.79 H 


 


Neutrophils % (Manual)  


 


Band Neutrophils %  


 


Lymphocytes % (Manual)  


 


Monocytes % (Manual)  


 


Metamyelocytes %  


 


Platelet Evaluation  


 


PT  


 


INR  


 


APTT  


 


D-Dimer, Quantitative  


 


pCO2   13 L*


 


pO2   154.0 H


 


HCO3   5.3 L*


 


ABG pH   7.22 L


 


ABG Total CO2   5.7 L


 


ABG O2 Saturation   96.2


 


ABG O2 Content   9.0 L


 


ABG Base Excess   -20.5 L


 


ABG Hemoglobin   6.4 L


 


ABG Carboxyhemoglobin   0 L


 


POC ABG HHb (Measured)   3.8


 


ABG Methemoglobin   0.9


 


ABG O2 Capacity   9.4 L


 


VBG pH  


 


VBG pCO2  


 


VBG HCO3  


 


VBG Total CO2  


 


VBG O2 Sat (Calc)  


 


VBG Base Excess  


 


VBG Potassium  


 


Hgb O2 Saturation   95.4


 


Sodium  


 


Chloride  


 


Glucose  


 


Lactate  


 


FiO2   28.0


 


Blood Gas Comments  


 


Crit Value Called To   [facility]


 


Crit Value Called By   Tee


 


Blood Gas Notified Time   633


 


Potassium  


 


Carbon Dioxide  


 


Anion Gap  


 


BUN  


 


Creatinine  


 


Est GFR ( Amer)  


 


Est GFR (Non-Af Amer)  


 


Random Glucose  


 


Calcium  


 


Phosphorus  


 


Magnesium  


 


Total Bilirubin  


 


AST  


 


ALT  


 


Alkaline Phosphatase  


 


Total Creatine Kinase  


 


CK-MB (CK-2)  


 


CK-MB (CK-2) %  


 


Troponin I  


 


NT-Pro-B Natriuret Pep  


 


Total Protein  


 


Albumin  


 


Globulin  


 


Albumin/Globulin Ratio  


 


Procalcitonin  


 


Venous Blood Potassium  


 


Urine Color  


 


Urine Appearance  


 


Urine pH  


 


Ur Specific Gravity  


 


Urine Protein  


 


Urine Glucose (UA)  


 


Urine Ketones  


 


Urine Blood  


 


Urine Nitrate  


 


Urine Bilirubin  


 


Urine Urobilinogen  


 


Ur Leukocyte Esterase  


 


Urine RBC  


 


Urine WBC  


 


Ur Epithelial Cells  


 


Urine Bacteria  














Addendum


Addendum: 





02/04/19 15:58


ICU Attending Addendum 


Patient seen and examined on 2/4 in the ED. Case reviewed on round with nader conteh.  Agree with resident note above with the following additions/exceptions





68M presents with lethargy and poor PO intake found to be in severe metabolic 

acidosis from acute renal failure likely ATN from dehydration.  Will fluid 

challenege with 3-4L NS and bicarb drip.  He is breathing fast to compensate but

does not appear to be failing. Hold off on intubation.  Nephro on board for 

evaluation of HD for acidosis and hyperK.  Will rule out ACS and sepsis as well.





Repeat VBG in 2 hours


as well as repeat chem


check TNI


pan culture


3L NS at leastbicarb drip


insert jovel


f/u nephro recs


f/u DVT study





Rest of care as above





Walt Sow MD


Pulmonary Critical Care Attending


Critical Care Time: 31 mins


02/04/19 17:59

## 2019-02-04 NOTE — RAD
Date of service: 



02/04/2019



HISTORY:

 sob 



COMPARISON:

No prior. 



FINDINGS:



LUNGS:

No active pulmonary disease.



PLEURA:

No significant pleural effusion identified, no pneumothorax apparent.



CARDIOVASCULAR:

No aortic atherosclerotic calcification present.



Normal cardiac size. Minimal vascular congestion



OSSEOUS STRUCTURES:

No significant abnormalities.



VISUALIZED UPPER ABDOMEN:

Normal.



OTHER FINDINGS:

None.



IMPRESSION:

No active disease.

## 2019-02-04 NOTE — ED PDOC
Arrival/HPI





- General


Chief Complaint: Shortness Of Breath


Historian: Patient





- Critical Care


Critical Care Minutes: 45 minutes





- History of Present Illness


Narrative History of Present Illness (Text): 





02/04/19 14:43


68 year old M w/ no apparent past medical history presenting to the Emergency 

Room for shortness of breath. Per EMS, the patient was found at home having 

difficulty breathing, noted to be hypoxic to 90% on room air with faint wheezes.

He was not given any medication prior to arrival to the Emergency Room. Per EMS,

the patient has a poor history of seeing physicians and had been self-medicating

with topical cortisone for a chronic skin lesion noted to his R calf. He denies 

chest pain, abdominal pain, syncopal episodes, back pain, headache, or 

parasthesias at this time.








No PMD





Time/Duration: Prior to Arrival


Symptom Onset: Sudden


Symptom Course: Unchanged


Activities at Onset: Rest


Context: Home





Past Medical History





- Provider Review


Nursing Documentation Reviewed: Yes





- Travel History


Have you recently traveled outside [place] w/in the past 3 mons?: No





- Musculoskeletal/Rheumatological


Hx Arthritis: Yes





- Psychiatric


Hx Substance Use: No





- Anesthesia


Hx Anesthesia: No


Hx Anesthesia Reactions: No


Hx Malignant Hyperthermia: No





Family/Social History





- Physician Review


Nursing Documentation Reviewed: Yes


Family/Social History: Unknown Family HX


Smoking Status: Never Smoked


Hx Alcohol Use: No


Hx Substance Use: No





Allergies/Home Meds


Allergies/Adverse Reactions: 


Allergies





No Known Allergies Allergy (Verified 02/04/19 19:36)


   








Home Medications: 


                                    Home Meds











 Medication  Instructions  Recorded  Confirmed


 


RX: No Known Home Med  02/04/19 02/04/19














Review of Systems





- Physician Review


All systems were reviewed & negative as marked: Yes





- Review of Systems


Respiratory: SOB, Cough, Wheezing.  absent: Sputum


Cardiovascular: absent: Chest Pain, Palpitations, Edema





Physical Exam


Temperature: Afebrile


Blood Pressure: Normal


Pulse: Regular


Respiratory Rate: Mechanically Ventilated


Appearance: Positive for: Unkept


Pain Distress: None


Mental Status: Positive for: Alert and Oriented X 3





- Systems Exam


Head: Present: Atraumatic, Normocephalic


Pupils: Present: PERRL


Extroacular Muscles: Present: EOMI


Conjunctiva: Present: Normal


Mouth: Present: Dry


Pharnyx: Present: Normal


Respiratory/Chest: Present: Wheezes (faint expiratory wheezes heard in anterior 

lung fields), Tachypneic.  No: Good Air Exchange, Respiratory Distress, 

Retracting


Abdomen: Present: Normal Bowel Sounds.  No: Tenderness, Distention, Peritoneal 

Signs


Lower Extremity: Present: Edema, Capillary Refill < 2 s, Other (Thickened skin 

noted to )


Neurological: Present: GCS=15, Speech Normal (able to speak in full sentences)


Skin: Present: Warm, Dry, Induration ( Keratotic grey lesion noted 

circumferentially to RLE. ).  No: Rashes


Psychiatric: Present: Alert, Oriented x 3, Normal Insight, Normal Concentration





Medical Decision Making


ED Course and Treatment: 





02/04/19 14:51


Impression 


68M  w/ exertional dyspnea





Differential Diagnoses Includes But Is Not Limited To:


--COPD exacerbation


--ACS


--CHF 


--Arrhythmia





Plan


--Labs


--CXR


--IVF


--Blood cultures


--VBG


--Urinalysis


--ICU consult


--ABG


--Zosyn


--Vancomycin


--Renal consult


--Reassess & disposition





Progress Notes


02/04/19 15:30


Labs reviewed with leukocytosis of 23 noted and as well as abg which reveals 

metabolic acidosis with CO2: 13 and pH:6.98. Code Sepsis called. Zosyn and 

Vancomycin ordered. BNP elevated to 14,500. Will give gentle fluids.  D-Dimer 

noted to be elevate, but will hold off on CTPE due to declining renal function 





02/04/19 15:39


Spoke to Dr. NOREEN Sow(intensivist) who is aware and will come see the patient. 

Creatinine/BUN noted to be 18.1 & 232 respectively with worry for renal failure.

Spoke to Dr. GINETTE Hall(PCP) who accepts patient and requests Dr. Cruz(infectious disease) and Dr. Cardoso(nephrology).





02/04/19 15:51


Dr. Sow at the bedside evaluating patient. Spoke to Dr. Cardoso who reccommends 

giving the patient fluids and possibly dialysis tomorrow if kidney profile is 

unchanged. Relayed message to admitting team.














- Lab Interpretations


Lab Results: 














                                 02/04/19 14:50 





                                 02/04/19 14:50 





                                   Lab Results





02/04/19 15:15: pCO2 13 L*, pO2 142.0 H, HCO3 3.1 L*, ABG pH 6.98 L*, ABG Total 

CO2 3.5 L, ABG O2 Saturation 96.7, ABG O2 Content 12.0 L, ABG Base Excess -26.5 

L, ABG Hemoglobin 8.7 L, ABG Carboxyhemoglobin 0 L, POC ABG HHb (Measured) 3.3, 

ABG Methemoglobin 1.0, ABG O2 Capacity 12.4 L, Hgb O2 Saturation 95.6, FiO2 

28.0, Blood Gas Comments Walked to er md, Crit Value Called To harish Nunn, 

Crit Value Called By Ec, Blood Gas Notified Time 1522


02/04/19 14:50: Sodium 131 L, Chloride 96 L, Potassium 5.7 H*, Carbon Dioxide 5 

L, Anion Gap 36 H,  H*, Creatinine 18.1 H*, Est GFR ( Amer) 3, Est

GFR (Non-Af Amer) 3, Random Glucose 136 H, Calcium 7.0 L, Magnesium 1.9, Total 

Bilirubin 0.8, AST 34, ALT 23, Alkaline Phosphatase 109, Total Creatine Kinase 

477 H, CK-MB (CK-2) 25.0 H, CK-MB (CK-2) % 5.2 H, NT-Pro-B Natriuret Pep 82441 H

, Total Protein 7.7, Albumin 3.8, Globulin 4.0, Albumin/Globulin Ratio 1.0 L


02/04/19 14:50: pO2 215 H, VBG pH 6.94 L*, VBG pCO2 17.0 L*, VBG HCO3 3.7 L, VBG

Total CO2 4.2 L, VBG O2 Sat (Calc) 96.8 H, VBG Base Excess -27.2 L, VBG 

Potassium 5.6 H, Sodium 125.0 L, Chloride 94.0 L, Glucose 140 H, Lactate 1.2, 

FiO2 21.0, Crit Value Called To Kasey ramírez, Crit Value Called By Atc, Blood 

Gas Notified Time 1505, Venous Blood Potassium 5.6 H


02/04/19 14:50: PT 14.3 H, INR 1.29, APTT 38.1, D-Dimer, Quantitative 5760 H


02/04/19 14:50: WBC 23.3 H, RBC 3.16 L, Hgb 9.0 L, Hct 26.2 L, MCV 82.9, MCH 

28.5, MCHC 34.4, RDW 14.5, Plt Count 264, MPV 9.5, Neut % (Auto) 91.4 H, Lymph %

(Auto) 6.7 L, Mono % (Auto) 1.5, Eos % (Auto) 0.2 L, Baso % (Auto) 0.2, Lymph # 

(Auto) 1.6, Mono # (Auto) 0.4, Eos # (Auto) 0.0, Baso # (Auto) 0.05, Absolute 

Neuts (auto) 21.29 H, Neutrophils % (Manual) 85 H, Band Neutrophils % 4 H, 

Lymphocytes % (Manual) 4 L, Monocytes % (Manual) 6, Metamyelocytes % 1, Platelet

Evaluation Normal








I have reviewed the lab results: Yes





- RAD Interpretation


Radiology Orders: 











02/04/19 14:13


CHEST PORTABLE [RAD] Stat 














- Medication Orders


Current Medication Orders: 











Albuterol/Ipratropium (Duoneb 3 Mg/0.5 Mg (3 Ml) Ud)  3 ml IH STAT STA


   Stop: 02/04/19 14:13


Methylprednisolone (Solu-Medrol)  125 mg IVP STAT STA


   Stop: 02/04/19 14:14











Disposition/Present on Arrival





- Present on Arrival


Any Indicators Present on Arrival: No


History of DVT/PE: No


History of Uncontrolled Diabetes: No


Urinary Catheter: No


History of Decub. Ulcer: No


History Surgical Site Infection Following: None





- Disposition


Have Diagnosis and Disposition been Completed?: Yes


Diagnosis: 


 Acute renal failure, Sepsis





Disposition: HOSPITALIZED


Disposition Time: 15:51


Patient Plan: ICU


Condition: SERIOUS

## 2019-02-04 NOTE — CARD
--------------- APPROVED REPORT --------------





Date of service: 02/04/2019



EKG Measurement

Heart [id number]

MN [id number]

[id number]

[id number]

[id number]



<Conclusion>

Sinus rhythm with 1st degree AV block

Nonspecific ST and T wave abnormality

Prolonged QT

Abnormal ECG

## 2019-02-04 NOTE — PCM.SEPTIC
Sepsis Progress Note





- Reassessment Type


Date of Evaluation: 02/04/19


Time of Evaluation: 18:30


Reassessment Type: Non-invasive reassessment





- Non Invasive Reassessment


Were the most recent vital sign reviewed: Yes


Vital Sign (Latest): 


                                        











Temp Pulse Resp BP Pulse Ox


 


 94.1 F L  97 H  21   129/61   98 


 


 02/04/19 18:33  02/04/19 17:58  02/04/19 17:58  02/04/19 17:58  02/04/19 17:58








Cardiovascular: Yes: Tachycardia.  No: Gallop, JVD, Murmur, Friction Rub


Respiratory: Yes: Wheezing (expiratory), Other (tachypnic).  No: Accessory 

Muscle Use


Capillary Refill: Normal (Less than 2 sec)


Skin: Normal Color, Warm, Dry

## 2019-02-05 LAB
% IRON SATURATION: 93 % (ref 20–55)
ALBUMIN SERPL-MCNC: 3.3 G/DL (ref 3–4.8)
ALBUMIN/GLOB SERPL: 1 {RATIO} (ref 1.1–1.8)
ALT SERPL-CCNC: 23 U/L (ref 7–56)
ARTERIAL BLOOD GAS HEMOGLOBIN: 6.4 G/DL (ref 11.7–17.4)
ARTERIAL BLOOD GAS O2 SAT: 96.2 % (ref 95–98)
ARTERIAL BLOOD GAS PCO2: 13 MM/HG (ref 35–45)
ARTERIAL BLOOD GAS TCO2: 5.7 MMOL.L (ref 22–28)
AST SERPL-CCNC: 39 U/L (ref 17–59)
BASOPHILS # BLD AUTO: 0 K/MM3 (ref 0–2)
BASOPHILS NFR BLD: 0 % (ref 0–3)
BUN SERPL-MCNC: 234 MG/DL (ref 7–21)
C3 SERPL-MCNC: 93 MG/DL (ref 88–165)
C4 SERPL-MCNC: 36.5 MG/DL (ref 14–44)
CALCIUM SERPL-MCNC: 6.5 MG/DL (ref 8.4–10.5)
CREATININE,RANDOM URINE: 54 MG/DL
EOSINOPHIL # BLD: 0 10*3/UL (ref 0–0.7)
EOSINOPHIL NFR BLD: 0.1 % (ref 1.5–5)
ERYTHROCYTE [DISTWIDTH] IN BLOOD BY AUTOMATED COUNT: 14.3 % (ref 11.5–14.5)
FERRITIN SERPL-MCNC: 489 NG/ML
GFR NON-AFRICAN AMERICAN: 3
HCO3 BLDA-SCNC: 5.3 MMOL/L (ref 21–28)
HEPATITIS B CORE AB: NEGATIVE
HEPATITIS C ANTIBODY: NEGATIVE
HGB BLD-MCNC: 7.7 G/DL (ref 14–18)
INHALED O2 CONCENTRATION: 28 %
IRON SERPL-MCNC: 137 UG/DL (ref 45–180)
LYMPHOCYTES # BLD: 0.4 10*3/UL (ref 1.2–3.4)
LYMPHOCYTES NFR BLD AUTO: 3.4 % (ref 22–35)
MCH RBC QN AUTO: 27.9 PG (ref 25–35)
MCHC RBC AUTO-ENTMCNC: 34.7 G/DL (ref 31–37)
MCV RBC AUTO: 80.4 FL (ref 80–105)
MONOCYTES # BLD AUTO: 0.1 10*3/UL (ref 0.1–0.6)
MONOCYTES NFR BLD: 0.7 % (ref 1–6)
O2 CAP BLDA-SCNC: 9.4 ML/DL (ref 16–24)
O2 CT BLDA-SCNC: 9 ML/DL (ref 15–23)
PH BLDA: 7.22 [PH] (ref 7.35–7.45)
PLATELET # BLD: 239 10^3/UL (ref 120–450)
PMV BLD AUTO: 9.4 FL (ref 7–11)
PO2 BLDA: 154 MM/HG (ref 80–100)
RBC # BLD AUTO: 2.76 10^6/UL (ref 3.5–6.1)
TIBC SERPL-MCNC: 146 UG/DL (ref 261–462)
TRANSFERRIN SERPL-MCNC: 100.88 MG/DL (ref 206–381)
TROPONIN I SERPL-MCNC: 0.95 NG/ML
VIT B12 SERPL-MCNC: 787 PG/ML (ref 239–931)
WBC # BLD AUTO: 12.3 10^3/UL (ref 4.5–11)

## 2019-02-05 PROCEDURE — 5A1D70Z PERFORMANCE OF URINARY FILTRATION, INTERMITTENT, LESS THAN 6 HOURS PER DAY: ICD-10-PCS

## 2019-02-05 PROCEDURE — 05HM33Z INSERTION OF INFUSION DEVICE INTO RIGHT INTERNAL JUGULAR VEIN, PERCUTANEOUS APPROACH: ICD-10-PCS | Performed by: INTERNAL MEDICINE

## 2019-02-05 RX ADMIN — CEFEPIME SCH MLS/HR: 1 INJECTION, SOLUTION INTRAVENOUS at 17:45

## 2019-02-05 RX ADMIN — NYSTATIN SCH APPLIC: 100000 CREAM TOPICAL at 18:51

## 2019-02-05 RX ADMIN — NYSTATIN SCH APPLIC: 100000 CREAM TOPICAL at 13:43

## 2019-02-05 NOTE — [email or web address]
History of Present Illness





- History of Present Illness


History of Present Illness: 


Nephrology Consultation Note:





Assessment: critical


Acute Kidney Injury (N17.9) ? etiology with uremia


Anemia (D64.9), Hyperphosphatemia (E83.39) hyperkalemia


HAGMA iwth respi compensation


obesity


NSAIDs use


ex smoker


mild left hydro with calculus





Plan


Pt is need of acute need for renal replacement therapy initiation at this time. 

hasn't improved with conservative management, d/w pt about dialysis, pros/cons, 

he agreed. Will plan for HD today as ordered 


Maintain hemodynamics stable. Avoid hypotension. Patient not on ACEI/ARB due to 

recent ROXANE


Monitor Input/Output, daily weights and renal function with basic metabolic 

panel


started phos binder, nephrovite and weekly aransep


added flomax. suggest urology eval


may consider kidney biopsy depending upon work up results


bicarb drip changed as ordered





Check urine analysis, spot protein/creatinine, albumin/creatinine ratio, urine 

for eosinophils. renal and bladder sonogram


Check GN work up as C3, C4, GLENN, Anti dsDNA, ASO titers, ANCA (MPO and NM-3), 

Anti GBM antibody, HIV/Hep B and Hep C serology 


Anemia work up with TSAT/Ferritin/Vitamin B12/folate, serum protein electroph

oresis with immunofixation, serum free light chain assay (Kappa/Lambda)


Check for 25-OH vitamin D, iPTH, phosphorus level. 





Dose meds/antibiotics for reduced GFR. Avoid fleets enema/magnesium based l

axatives. Avoid nephrotoxins/NSAIDs/ iodinated contrast (unless needed 

emergently)


Glycemic control


Further work up/management as per primary team





Thanks for allowing me to participate in care of your patient. Will follow 

patient with you. Please call if any Qs. had d/w team


Dr You Cardoso


Office: [phone] Cell: [phone] Fax: [phone]





Chief Complaint; SOB on exertion


Reason for consult: Acute Kidney Injury


HPI: Pt is a 68 M   with hx of HTN obesity hip replacement, hasn't f/up with PMD

for long time presented with complaints of worsening SOB on exertion and feeling

sick. found to have severe ROXANE. pt not aware about kidney disease in past. 

denies change in urine output recently. 


Denies OTC/herbal meds but take NSAIDs as alleve 2 tab daily 


No recent iodinated contrast exposure. No obvious episodes of low BP.


ex smoker. no etoh or drugs


decreased oral intake for last few days





ROS: 


Cardiovascular: No chest pain. 


Pulmonary: c/o shortness of breath 


Gastrointestinal: denies abdominal pain had nausea. No vomiting. 


Genitourinary: No pain while urinating. Denies blood in urine. 


All other negative except as mentioned in HPI. has reduced appetite





Physical Examination:      


General Appearance: ill appearing co-operative . 


Vitals reviewed and noted as below


Head; Atraumatic, normocephalic


ENT: no ulcers no thrush. Tongue is midline/dry. Oropharynx: no rash or ulcers.


EYES: Pupils are equal, round and reactive to light accommodation. Eye muscles 

and extraocular movement intact. Sclera is anicteric.


Neck; supple no lymphadenopathy, no thyromegaly or bruit


Lungs: Increased respiratory rate/effort. Breath sounds bilateral equal and 

clear


Heart: Increased rate. s1s2 normal. No rub or gallop. 


Extremities: no edema. No varicose veins


Neurological: Patient is alert, awake and oriented to person, place and time. No

focal deficit. Strength bilateral appropriate and equal


Skin: Warm and dry. Normal turgor. Palpitation: Normal elasticity for age. 

axillary fungal rash +. Rt lower extremity scaly/scab ulceration


Abdomen: Abdomen is soft. Bowel sounds +. There is no abdominal tenderness, no 

guarding/rigidity no organomegaly


Psych: normal insight and normal affect/mood


MSK: no joint tenderness or swelling. Digits and nails normal, no deformity


: kidney or bladder not palpable





Labs/imaging reviewed.


Past medical history, past surgical history, family history, social history, 

allergy reviewed and noted as below


Family hx: hx of CKD/ESRD and lupus in mother. Rest non-contributory 














Past Patient History





- Past Social History


Smoking Status: Former Smoker





- CARDIAC


Hx Cardiac Disorders: No





- PULMONARY


Hx Respiratory Disorders: Yes (SMOKED CIGARETTES 1 PPD QUIT 2012.)





- NEUROLOGICAL


Hx Neurological Disorder: No





- HEENT


Hx HEENT Problems: No





- RENAL


Hx Chronic Kidney Disease: Yes


Hx Renal Failure: Yes (5-2-19)





- ENDOCRINE/METABOLIC


Hx Endocrine Disorders: No





- HEMATOLOGICAL/ONCOLOGICAL


Hx Blood Disorders: No





- INTEGUMENTARY


Hx Dermatological Problems: Yes


Hx Psoriasis: Yes (BILATERAL LE)





- MUSCULOSKELETAL/RHEUMATOLOGICAL


Hx Musculoskeletal Disorders: Yes


Hx Arthritis: Yes


Hx Falls: Yes





- GASTROINTESTINAL


Hx Gastrointestinal Disorders: No





- GENITOURINARY/GYNECOLOGICAL


Hx Genitourinary Disorders: No





- PSYCHIATRIC


Hx Psychophysiologic Disorder: No


Hx Substance Use: No





- SURGICAL HISTORY


Hx Surgeries: Yes (BILATERAL HIP SX 2016)





- ANESTHESIA


Hx Anesthesia: No


Hx Anesthesia Reactions: No


Hx Malignant Hyperthermia: No





Meds


Allergies/Adverse Reactions: 


                                    Allergies











Allergy/AdvReac Type Severity Reaction Status Date / Time


 


No Known Allergies Allergy   Verified 02/04/19 19:36














- Medications


Medications: 


                               Current Medications





Albuterol/Ipratropium (Duoneb 3 Mg/0.5 Mg (3 Ml) Ud)  3 ml IH Q2H PRN


   PRN Reason: Shortness of Breath


Aspirin (Aspirin Chewable)  81 mg PO DAILY [facility]


   Last Admin: 02/05/19 09:27 Dose:  81 mg


Atorvastatin Calcium (Lipitor)  40 mg PO DIN BRENT


Calcium Acetate (Phoslo)  2,001 mg PO WM [facility]


   Last Admin: 02/05/19 12:07 Dose:  Not Given


Darbepoetin Balta (Aranesp)  100 mcg IVP QWK [facility]


Heparin Sodium (Porcine) (Heparin)  5,000 units SC Q8 [facility]; Protocol


   Last Admin: 02/05/19 05:02 Dose:  5,000 units


Sodium Bicarbonate 150 meq/ (Sodium Chloride)  1,150 mls @ 150 mls/hr IV .Q7H40M

[facility]


   Last Admin: 02/05/19 09:43 Dose:  150 mls/hr


Cefepime HCl (Maxipime 1gm)  1 gm in 100 mls @ 100 mls/hr IVPB Q24H [facility]; 

Protocol


   Last Admin: 02/04/19 20:27 Dose:  100 mls/hr


Nystatin (Mycostatin Cream)  0 ea TOP TID [facility]


Pantoprazole Sodium (Protonix Inj)  40 mg IVP DAILY [facility]


   Last Admin: 02/05/19 09:02 Dose:  40 mg


Vitamin B Complex/Vit C/Folic Acid (Nephro-Seda)  1 tab PO 0800 [facility]











Results





- Vital Signs


Recent Vital Signs: 


                                Last Vital Signs











Temp  96.6 F L  02/04/19 23:16


 


Pulse  108 H  02/05/19 06:00


 


Resp  23   02/04/19 22:12


 


BP  129/61   02/04/19 17:58


 


Pulse Ox  98   02/04/19 17:58














- Labs


Result Diagrams: 


                                 02/05/19 05:40





                                 02/05/19 05:40


Labs: 


                         Laboratory Results - last 24 hr











  02/04/19 02/04/19 02/04/19





  14:50 14:50 14:50


 


WBC  23.3 H  


 


RBC  3.16 L  


 


Hgb  9.0 L  


 


Hct  26.2 L  


 


MCV  82.9  


 


MCH  28.5  


 


MCHC  34.4  


 


RDW  14.5  


 


Plt Count  264  


 


MPV  9.5  


 


Neut % (Auto)  91.4 H  


 


Lymph % (Auto)  6.7 L  


 


Mono % (Auto)  1.5  


 


Eos % (Auto)  0.2 L  


 


Baso % (Auto)  0.2  


 


Lymph # (Auto)  1.6  


 


Mono # (Auto)  0.4  


 


Eos # (Auto)  0.0  


 


Baso # (Auto)  0.05  


 


Absolute Neuts (auto)  21.29 H  


 


Neutrophils % (Manual)  85 H  


 


Band Neutrophils %  4 H  


 


Lymphocytes % (Manual)  4 L  


 


Monocytes % (Manual)  6  


 


Metamyelocytes %  1  


 


Platelet Evaluation  Normal  


 


PT   14.3 H 


 


INR   1.29 


 


APTT   38.1 


 


D-Dimer, Quantitative   5760 H 


 


pCO2   


 


pO2    215 H


 


HCO3   


 


ABG pH   


 


ABG Total CO2   


 


ABG O2 Saturation   


 


ABG O2 Content   


 


ABG Base Excess   


 


ABG Hemoglobin   


 


ABG Carboxyhemoglobin   


 


POC ABG HHb (Measured)   


 


ABG Methemoglobin   


 


ABG O2 Capacity   


 


VBG pH    6.94 L*


 


VBG pCO2    17.0 L*


 


VBG HCO3    3.7 L


 


VBG Total CO2    4.2 L


 


VBG O2 Sat (Calc)    96.8 H


 


VBG Base Excess    -27.2 L


 


VBG Potassium    5.6 H


 


Hgb O2 Saturation   


 


Sodium    125.0 L


 


Chloride    94.0 L


 


Glucose    140 H


 


Lactate    1.2


 


FiO2    21.0


 


Blood Gas Comments   


 


Crit Value Called To    Kasey ramírez


 


Crit Value Called By    [facility]


 


Blood Gas Notified Time    1505


 


Potassium   


 


Carbon Dioxide   


 


Anion Gap   


 


BUN   


 


Creatinine   


 


Est GFR ( Amer)   


 


Est GFR (Non-Af Amer)   


 


Random Glucose   


 


Calcium   


 


Phosphorus   


 


Magnesium   


 


Iron   


 


TIBC   


 


% Saturation   


 


Total Bilirubin   


 


AST   


 


ALT   


 


Alkaline Phosphatase   


 


Total Creatine Kinase   


 


CK-MB (CK-2)   


 


CK-MB (CK-2) %   


 


Troponin I   


 


NT-Pro-B Natriuret Pep   


 


Total Protein   


 


Albumin   


 


Globulin   


 


Albumin/Globulin Ratio   


 


Procalcitonin   


 


Venous Blood Potassium    5.6 H


 


Urine Color   


 


Urine Appearance   


 


Urine pH   


 


Ur Specific Gravity   


 


Urine Protein   


 


Urine Glucose (UA)   


 


Urine Ketones   


 


Urine Blood   


 


Urine Nitrate   


 


Urine Bilirubin   


 


Urine Urobilinogen   


 


Ur Leukocyte Esterase   


 


Urine RBC   


 


Urine WBC   


 


Ur Epithelial Cells   


 


Urine Bacteria   


 


Ur Random Creatinine   


 


Ur Random Sodium   














  02/04/19 02/04/19 02/04/19





  14:50 15:15 17:56


 


WBC   


 


RBC   


 


Hgb   


 


Hct   


 


MCV   


 


MCH   


 


MCHC   


 


RDW   


 


Plt Count   


 


MPV   


 


Neut % (Auto)   


 


Lymph % (Auto)   


 


Mono % (Auto)   


 


Eos % (Auto)   


 


Baso % (Auto)   


 


Lymph # (Auto)   


 


Mono # (Auto)   


 


Eos # (Auto)   


 


Baso # (Auto)   


 


Absolute Neuts (auto)   


 


Neutrophils % (Manual)   


 


Band Neutrophils %   


 


Lymphocytes % (Manual)   


 


Monocytes % (Manual)   


 


Metamyelocytes %   


 


Platelet Evaluation   


 


PT   


 


INR   


 


APTT   


 


D-Dimer, Quantitative   


 


pCO2   13 L* 


 


pO2   142.0 H 


 


HCO3   3.1 L* 


 


ABG pH   6.98 L* 


 


ABG Total CO2   3.5 L 


 


ABG O2 Saturation   96.7 


 


ABG O2 Content   12.0 L 


 


ABG Base Excess   -26.5 L 


 


ABG Hemoglobin   8.7 L 


 


ABG Carboxyhemoglobin   0 L 


 


POC ABG HHb (Measured)   3.3 


 


ABG Methemoglobin   1.0 


 


ABG O2 Capacity   12.4 L 


 


VBG pH   


 


VBG pCO2   


 


VBG HCO3   


 


VBG Total CO2   


 


VBG O2 Sat (Calc)   


 


VBG Base Excess   


 


VBG Potassium   


 


Hgb O2 Saturation   95.6 


 


Sodium  131 L   131 L


 


Chloride  96 L   97 L


 


Glucose   


 


Lactate   


 


FiO2   28.0 


 


Blood Gas Comments   Walked to er md 


 


Crit Value Called To   harish Nunn 


 


Crit Value Called By   Ec 


 


Blood Gas Notified Time   1522 


 


Potassium  5.7 H*   5.4 H


 


Carbon Dioxide  5 L   < 5 L


 


Anion Gap  36 H   34 H


 


BUN  232 H*   230 H*


 


Creatinine  18.1 H*   18.3 H*


 


Est GFR ( Amer)  3   3


 


Est GFR (Non-Af Amer)  3   3


 


Random Glucose  136 H   211 H


 


Calcium  7.0 L   6.7 L*


 


Phosphorus    13.4 H


 


Magnesium  1.9   2.0


 


Iron   


 


TIBC   


 


% Saturation   


 


Total Bilirubin  0.8   0.9


 


AST  34   29


 


ALT  23   26


 


Alkaline Phosphatase  109   105


 


Total Creatine Kinase  477 H  


 


CK-MB (CK-2)  25.0 H  


 


CK-MB (CK-2) %  5.2 H  


 


Troponin I    0.11


 


NT-Pro-B Natriuret Pep  39392 H  


 


Total Protein  7.7   7.3


 


Albumin  3.8   3.6


 


Globulin  4.0   3.7


 


Albumin/Globulin Ratio  1.0 L   1.0 L


 


Procalcitonin   


 


Venous Blood Potassium   


 


Urine Color   


 


Urine Appearance   


 


Urine pH   


 


Ur Specific Gravity   


 


Urine Protein   


 


Urine Glucose (UA)   


 


Urine Ketones   


 


Urine Blood   


 


Urine Nitrate   


 


Urine Bilirubin   


 


Urine Urobilinogen   


 


Ur Leukocyte Esterase   


 


Urine RBC   


 


Urine WBC   


 


Ur Epithelial Cells   


 


Urine Bacteria   


 


Ur Random Creatinine   


 


Ur Random Sodium   














  02/04/19 02/04/19 02/04/19





  18:00 18:14 18:14


 


WBC    21.9 H


 


RBC    3.04 L


 


Hgb    8.6 L


 


Hct    25.2 L


 


MCV    82.9


 


MCH    28.3


 


MCHC    34.1


 


RDW    14.6 H


 


Plt Count    268


 


MPV    9.7


 


Neut % (Auto)    93.9 H


 


Lymph % (Auto)    4.4 L


 


Mono % (Auto)    1.6


 


Eos % (Auto)    0.0 L


 


Baso % (Auto)    0.1


 


Lymph # (Auto)    1.0 L


 


Mono # (Auto)    0.4


 


Eos # (Auto)    0.0


 


Baso # (Auto)    0.03


 


Absolute Neuts (auto)    20.56 H


 


Neutrophils % (Manual)   


 


Band Neutrophils %   


 


Lymphocytes % (Manual)   


 


Monocytes % (Manual)   


 


Metamyelocytes %   


 


Platelet Evaluation   


 


PT   


 


INR   


 


APTT   


 


D-Dimer, Quantitative   


 


pCO2   


 


pO2   110 H 


 


HCO3   


 


ABG pH   


 


ABG Total CO2   


 


ABG O2 Saturation   


 


ABG O2 Content   


 


ABG Base Excess   


 


ABG Hemoglobin   


 


ABG Carboxyhemoglobin   


 


POC ABG HHb (Measured)   


 


ABG Methemoglobin   


 


ABG O2 Capacity   


 


VBG pH   6.93 L* 


 


VBG pCO2   16.0 L* 


 


VBG HCO3   3.4 L 


 


VBG Total CO2   3.9 L 


 


VBG O2 Sat (Calc)   96.2 H 


 


VBG Base Excess   -27.7 L 


 


VBG Potassium   5.6 H 


 


Hgb O2 Saturation   


 


Sodium   126.0 L 


 


Chloride   95.0 L 


 


Glucose   215 H 


 


Lactate   1.4 


 


FiO2   21.0 


 


Blood Gas Comments   


 


Crit Value Called To   Karma victoria 


 


Crit Value Called By   [facility] 


 


Blood Gas Notified Time   1825 


 


Potassium   


 


Carbon Dioxide   


 


Anion Gap   


 


BUN   


 


Creatinine   


 


Est GFR ( Amer)   


 


Est GFR (Non-Af Amer)   


 


Random Glucose   


 


Calcium   


 


Phosphorus   


 


Magnesium   


 


Iron   


 


TIBC   


 


% Saturation   


 


Total Bilirubin   


 


AST   


 


ALT   


 


Alkaline Phosphatase   


 


Total Creatine Kinase   


 


CK-MB (CK-2)   


 


CK-MB (CK-2) %   


 


Troponin I   


 


NT-Pro-B Natriuret Pep   


 


Total Protein   


 


Albumin   


 


Globulin   


 


Albumin/Globulin Ratio   


 


Procalcitonin  1.89 H  


 


Venous Blood Potassium   5.6 H 


 


Urine Color   


 


Urine Appearance   


 


Urine pH   


 


Ur Specific Gravity   


 


Urine Protein   


 


Urine Glucose (UA)   


 


Urine Ketones   


 


Urine Blood   


 


Urine Nitrate   


 


Urine Bilirubin   


 


Urine Urobilinogen   


 


Ur Leukocyte Esterase   


 


Urine RBC   


 


Urine WBC   


 


Ur Epithelial Cells   


 


Urine Bacteria   


 


Ur Random Creatinine   


 


Ur Random Sodium   














  02/04/19 02/04/19 02/05/19





  22:42 23:15 05:40


 


WBC   


 


RBC   


 


Hgb   


 


Hct   


 


MCV   


 


MCH   


 


MCHC   


 


RDW   


 


Plt Count   


 


MPV   


 


Neut % (Auto)   


 


Lymph % (Auto)   


 


Mono % (Auto)   


 


Eos % (Auto)   


 


Baso % (Auto)   


 


Lymph # (Auto)   


 


Mono # (Auto)   


 


Eos # (Auto)   


 


Baso # (Auto)   


 


Absolute Neuts (auto)   


 


Neutrophils % (Manual)   


 


Band Neutrophils %   


 


Lymphocytes % (Manual)   


 


Monocytes % (Manual)   


 


Metamyelocytes %   


 


Platelet Evaluation   


 


PT   


 


INR   


 


APTT   


 


D-Dimer, Quantitative   


 


pCO2   


 


pO2   


 


HCO3   


 


ABG pH   


 


ABG Total CO2   


 


ABG O2 Saturation   


 


ABG O2 Content   


 


ABG Base Excess   


 


ABG Hemoglobin   


 


ABG Carboxyhemoglobin   


 


POC ABG HHb (Measured)   


 


ABG Methemoglobin   


 


ABG O2 Capacity   


 


VBG pH   


 


VBG pCO2   


 


VBG HCO3   


 


VBG Total CO2   


 


VBG O2 Sat (Calc)   


 


VBG Base Excess   


 


VBG Potassium   


 


Hgb O2 Saturation   


 


Sodium    136


 


Chloride    103


 


Glucose   


 


Lactate   


 


FiO2   


 


Blood Gas Comments   


 


Crit Value Called To   


 


Crit Value Called By   


 


Blood Gas Notified Time   


 


Potassium    4.7


 


Carbon Dioxide    7 L D


 


Anion Gap    30 H


 


BUN    234 H*


 


Creatinine    16.6 H*


 


Est GFR ( Amer)    4


 


Est GFR (Non-Af Amer)    3


 


Random Glucose    155 H


 


Calcium    6.5 L*


 


Phosphorus    11.9 H


 


Magnesium    1.7


 


Iron   


 


TIBC   


 


% Saturation   


 


Total Bilirubin    0.8


 


AST    39


 


ALT    23


 


Alkaline Phosphatase    86


 


Total Creatine Kinase   


 


CK-MB (CK-2)   


 


CK-MB (CK-2) %   


 


Troponin I   0.27 H* D  0.95 H* D


 


NT-Pro-B Natriuret Pep   


 


Total Protein    6.6


 


Albumin    3.3


 


Globulin    3.4


 


Albumin/Globulin Ratio    1.0 L


 


Procalcitonin   


 


Venous Blood Potassium   


 


Urine Color  Light yellow  


 


Urine Appearance  Sl cloudy  


 


Urine pH  6.0  


 


Ur Specific Gravity  1.020  


 


Urine Protein  Trace H  


 


Urine Glucose (UA)  Negative  


 


Urine Ketones  Negative  


 


Urine Blood  Moderate H  


 


Urine Nitrate  Negative  


 


Urine Bilirubin  Negative  


 


Urine Urobilinogen  0.2  


 


Ur Leukocyte Esterase  Moderate H  


 


Urine RBC  2 - 5 H  


 


Urine WBC  25 - 30 H  


 


Ur Epithelial Cells  0 - 2  


 


Urine Bacteria  Few  


 


Ur Random Creatinine   


 


Ur Random Sodium   














  02/05/19 02/05/19 02/05/19





  05:40 06:20 10:10


 


WBC  12.3 H D  


 


RBC  2.76 L  


 


Hgb  7.7 L  


 


Hct  22.2 L  


 


MCV  80.4  


 


MCH  27.9  


 


MCHC  34.7  


 


RDW  14.3  


 


Plt Count  239  


 


MPV  9.4  


 


Neut % (Auto)  95.8 H  


 


Lymph % (Auto)  3.4 L  


 


Mono % (Auto)  0.7 L  


 


Eos % (Auto)  0.1 L  


 


Baso % (Auto)  0.0  


 


Lymph # (Auto)  0.4 L  


 


Mono # (Auto)  0.1  


 


Eos # (Auto)  0.0  


 


Baso # (Auto)  0.00  


 


Absolute Neuts (auto)  11.79 H  


 


Neutrophils % (Manual)   


 


Band Neutrophils %   


 


Lymphocytes % (Manual)   


 


Monocytes % (Manual)   


 


Metamyelocytes %   


 


Platelet Evaluation   


 


PT   


 


INR   


 


APTT   


 


D-Dimer, Quantitative   


 


pCO2   13 L* 


 


pO2   154.0 H 


 


HCO3   5.3 L* 


 


ABG pH   7.22 L 


 


ABG Total CO2   5.7 L 


 


ABG O2 Saturation   96.2 


 


ABG O2 Content   9.0 L 


 


ABG Base Excess   -20.5 L 


 


ABG Hemoglobin   6.4 L 


 


ABG Carboxyhemoglobin   0 L 


 


POC ABG HHb (Measured)   3.8 


 


ABG Methemoglobin   0.9 


 


ABG O2 Capacity   9.4 L 


 


VBG pH   


 


VBG pCO2   


 


VBG HCO3   


 


VBG Total CO2   


 


VBG O2 Sat (Calc)   


 


VBG Base Excess   


 


VBG Potassium   


 


Hgb O2 Saturation   95.4 


 


Sodium   


 


Chloride   


 


Glucose   


 


Lactate   


 


FiO2   28.0 


 


Blood Gas Comments   


 


Crit Value Called To   [facility] 


 


Crit Value Called By   Tee 


 


Blood Gas Notified Time   633 


 


Potassium   


 


Carbon Dioxide   


 


Anion Gap   


 


BUN   


 


Creatinine   


 


Est GFR ( Amer)   


 


Est GFR (Non-Af Amer)   


 


Random Glucose   


 


Calcium   


 


Phosphorus   


 


Magnesium   


 


Iron    137


 


TIBC    146 L


 


% Saturation    93 H


 


Total Bilirubin   


 


AST   


 


ALT   


 


Alkaline Phosphatase   


 


Total Creatine Kinase   


 


CK-MB (CK-2)   


 


CK-MB (CK-2) %   


 


Troponin I   


 


NT-Pro-B Natriuret Pep   


 


Total Protein   


 


Albumin   


 


Globulin   


 


Albumin/Globulin Ratio   


 


Procalcitonin   


 


Venous Blood Potassium   


 


Urine Color   


 


Urine Appearance   


 


Urine pH   


 


Ur Specific Gravity   


 


Urine Protein   


 


Urine Glucose (UA)   


 


Urine Ketones   


 


Urine Blood   


 


Urine Nitrate   


 


Urine Bilirubin   


 


Urine Urobilinogen   


 


Ur Leukocyte Esterase   


 


Urine RBC   


 


Urine WBC   


 


Ur Epithelial Cells   


 


Urine Bacteria   


 


Ur Random Creatinine   


 


Ur Random Sodium   














  02/05/19





  11:00


 


WBC 


 


RBC 


 


Hgb 


 


Hct 


 


MCV 


 


MCH 


 


MCHC 


 


RDW 


 


Plt Count 


 


MPV 


 


Neut % (Auto) 


 


Lymph % (Auto) 


 


Mono % (Auto) 


 


Eos % (Auto) 


 


Baso % (Auto) 


 


Lymph # (Auto) 


 


Mono # (Auto) 


 


Eos # (Auto) 


 


Baso # (Auto) 


 


Absolute Neuts (auto) 


 


Neutrophils % (Manual) 


 


Band Neutrophils % 


 


Lymphocytes % (Manual) 


 


Monocytes % (Manual) 


 


Metamyelocytes % 


 


Platelet Evaluation 


 


PT 


 


INR 


 


APTT 


 


D-Dimer, Quantitative 


 


pCO2 


 


pO2 


 


HCO3 


 


ABG pH 


 


ABG Total CO2 


 


ABG O2 Saturation 


 


ABG O2 Content 


 


ABG Base Excess 


 


ABG Hemoglobin 


 


ABG Carboxyhemoglobin 


 


POC ABG HHb (Measured) 


 


ABG Methemoglobin 


 


ABG O2 Capacity 


 


VBG pH 


 


VBG pCO2 


 


VBG HCO3 


 


VBG Total CO2 


 


VBG O2 Sat (Calc) 


 


VBG Base Excess 


 


VBG Potassium 


 


Hgb O2 Saturation 


 


Sodium 


 


Chloride 


 


Glucose 


 


Lactate 


 


FiO2 


 


Blood Gas Comments 


 


Crit Value Called To 


 


Crit Value Called By 


 


Blood Gas Notified Time 


 


Potassium 


 


Carbon Dioxide 


 


Anion Gap 


 


BUN 


 


Creatinine 


 


Est GFR ( Amer) 


 


Est GFR (Non-Af Amer) 


 


Random Glucose 


 


Calcium 


 


Phosphorus 


 


Magnesium 


 


Iron 


 


TIBC 


 


% Saturation 


 


Total Bilirubin 


 


AST 


 


ALT 


 


Alkaline Phosphatase 


 


Total Creatine Kinase 


 


CK-MB (CK-2) 


 


CK-MB (CK-2) % 


 


Troponin I 


 


NT-Pro-B Natriuret Pep 


 


Total Protein 


 


Albumin 


 


Globulin 


 


Albumin/Globulin Ratio 


 


Procalcitonin 


 


Venous Blood Potassium 


 


Urine Color 


 


Urine Appearance 


 


Urine pH 


 


Ur Specific Gravity 


 


Urine Protein 


 


Urine Glucose (UA) 


 


Urine Ketones 


 


Urine Blood 


 


Urine Nitrate 


 


Urine Bilirubin 


 


Urine Urobilinogen 


 


Ur Leukocyte Esterase 


 


Urine RBC 


 


Urine WBC 


 


Ur Epithelial Cells 


 


Urine Bacteria 


 


Ur Random Creatinine  54


 


Ur Random Sodium  96

## 2019-02-05 NOTE — CP.PCM.APN
Subjective





- Date & Time of Evaluation


Date of Evaluation: 02/05/19


Time of Evaluation: 12:45





- Subjective


Subjective: 





Pt. seen receiving HD. 





Review of Systems





- Review of Systems


All systems: reviewed and no additional remarkable complaints except (receiving 

HD.)





Objective





- Vital Signs/Intake and Output


Vital Signs (last 24 hours): 


                                        











Temp Pulse Resp BP Pulse Ox


 


 98.4 F   100 H  22   100/67   93 L


 


 02/05/19 15:30  02/05/19 15:30  02/05/19 15:30  02/05/19 15:30  02/05/19 15:30








Intake and Output: 


                                        











 02/05/19 02/05/19





 06:59 18:59


 


Intake Total 5370 


 


Output Total 700 


 


Balance 4670 














- Medications


Medications: 


                               Current Medications





Albuterol/Ipratropium (Duoneb 3 Mg/0.5 Mg (3 Ml) Ud)  3 ml IH Q2H PRN


   PRN Reason: Shortness of Breath


Aspirin (Aspirin Chewable)  81 mg PO DAILY [facility]


   Last Admin: 02/05/19 09:27 Dose:  81 mg


Atorvastatin Calcium (Lipitor)  40 mg PO DIN [facility]


Calcium Acetate (Phoslo)  2,001 mg PO WM [facility]


   Last Admin: 02/05/19 12:07 Dose:  Not Given


Darbepoetin Balta (Aranesp)  100 mcg IVP QWK [facility]


   Last Admin: 02/05/19 13:50 Dose:  100 mcg


Heparin Sodium (Porcine) (Heparin)  5,000 units SC Q8 [facility]; Protocol


   Last Admin: 02/05/19 13:44 Dose:  5,000 units


Cefepime HCl (Maxipime 1gm)  1 gm in 100 mls @ 100 mls/hr IVPB Q24H [facility]; 

Protocol


   Last Admin: 02/04/19 20:27 Dose:  100 mls/hr


Sodium Bicarbonate 150 meq/ (Dextrose)  1,150 mls @ 50 mls/hr IV .Q23H [facility]


   Stop: 02/07/19 12:31


   Last Admin: 02/05/19 13:40 Dose:  50 mls/hr


Nystatin (Mycostatin Cream)  0 ea TOP TID [facility]


   Last Admin: 02/05/19 13:43 Dose:  1 applic


Pantoprazole Sodium (Protonix Inj)  40 mg IVP DAILY [facility]


   Last Admin: 02/05/19 09:02 Dose:  40 mg


Tamsulosin HCl (Flomax)  0.4 mg PO DAILY [facility]


Vitamin B Complex/Vit C/Folic Acid (Nephro-Seda)  1 tab PO 0800 [facility]











- Labs


Labs: 


                                        





                                 02/05/19 05:40 





                                 02/05/19 05:40 





                                        











PT  14.3 SECONDS (9.4-12.5)  H  02/04/19  14:50    


 


INR  1.29   02/04/19  14:50    


 


APTT  38.1 Seconds (26.9-38.3)   02/04/19  14:50    














- Constitutional


Appears: Non-toxic





- Head Exam


Head Exam: NORMOCEPHALIC





- Eye Exam


Eye Exam: absent: Conjunctival injection, EOMI, Normal appearance, Nystagmus, 

Periorbital swelling, Periorbital tenderness, PERRL, Scleral icterus





- ENT Exam


ENT Exam: absent: Mucous Membranes Dry, Mucous Membranes Moist, Normal Exam, 

Normal External Ear Exam, Normal Oropharynx, TM's Normal Bilaterally





- Neck Exam


Neck Exam: Full ROM





- Respiratory Exam


Respiratory Exam: Decreased Breath Sounds





- Cardiovascular Exam


Cardiovascular Exam: +S1, +S2





- Rectal Exam


Rectal Exam: Deferred





-  Exam


 Exam: absent: Circumcision, NORMAL INSPECTION, Scrotal Swelling, Testicular 

Tenderness, Uretheral Discharge, Testicular Vertical Lie, Bladder Distension


External exam: absent: Ecchymosis, Erythema, Lacerations, Lesions, NORMAL 

EXTERNAL EXAM, Swelling


Speculum exam: absent: Cervical Discharge, Erythema, Foreign Body, Laceration, 

NORMAL SPECULUM EXAM, Tissue, Vaginal Bleeding, Vaginal Discharge


Bimanual exam: absent: Adenexal Mass, Adnexal, Cervical Motion Tendernes, NORMAL

BIMANUAL EXAM, Uterine Enlargement, Uterine Tenderness





Assessment and Plan





- Assessment and Plan (Free Text)


Assessment: 





ITS Impressions





Chest X-Ray  02/04/19 14:13


IMPRESSION:


No active disease. 


 


 








Renal Ultrasound  02/05/19 09:28


IMPRESSION:


11 mm nonobstructing calculus lower pole left kidney.  Otherwise 


unremarkable.


 


 








Abdomen/Pelvis CT  02/05/19 10:24


IMPRESSION:


Obstructing 9 mm mid left ureteral calculus with mild left 


hydronephrosis 8 mm nonobstructing left lower pole renal calculus. 


Minor findings as above. Small bilateral pleural effusion, right 


greater than left. 


 


 








Chest X-Ray  02/05/19 12:34


IMPRESSION:


New right tunneled central venous dialysis catheter otherwise no 


significant change.  


 


 





Assessment:


68 year old male with PMH of arthritis, chronic right leg rash came in to [facility] 

because of worsening shortness of breath for the past week, associated with ge

neralized weakness. The patient has also been having dyspnea on exertion, 

admitted for acute renal failure /sepsis, pending ID and renal consults and 

management.





Plan:





1.  Sepsis   


   - SIRS r/t ARF as per I.D, antibiotics per I.D, UA pos, urine culture 

pending.


   Leukocytosis improving, WBC 23 on admit, today 12.3, Antibx as per I.D, trend

Wbc.





2.  UTI


   Urine culture results pending.


   -Antibx per I.D.





3.  ARF


   Most likely r/t dehydration, vs.recent NSAID use, vs. obstructive, with 

obstructing ureter stone on imaging.


   NA bicarb gtt, and HD as per nephro, trend Bun/Creatinine


   -Monitor Is Os.





4.  Hydronephrosis


   -most likely r/t obstructing left ureter stone, urology consult pending.





5.  Anemia


   -? Anemia, secondary to kidney disease


   monitor h/h, transfuse as per nephro.


   R/o GI source, Stool O.B pending, 





6.  Elevated Trop


   Card. consulted, manage conservatively, no cath at present





7.  Fluid overload, w.elevated BNP, 


   Likely secondary to acute kidney failure


   Monitor I's O.s, dialysis as per nephro

## 2019-02-05 NOTE — CP.CCUPN
<Anastasia Tyler - Last Filed: 02/05/19 13:36>





CCU Subjective





- Physician Review


Subjective (Free Text): 


Anastasia Tyler, PGY-1 ICU Progress Note for Dr. Allen





Patient seen and evaluated at bedside. No acute overnight events. Patient 

reports improved chest pain and shortness of breath. Patient denies headache, 

fever, heart palpitations, left arm pain, jaw pain, nausea, vomiting, 

constipation, abdominal pain, dysuria, hematuria. 12-point ROS was negative 

except for what was mentioned above.











CCU Objective





- Vital Signs / Intake & Output


Intake and Output (Last 8hrs): 


                                 Intake & Output











 02/04/19 02/05/19 02/05/19





 22:59 06:59 14:59


 


Intake Total  5370 


 


Output Total  700 


 


Balance  4670 


 


Weight 225 lb 225 lb 


 


Intake:   


 


  IV  5250 


 


    Left Wrist  1800 


 


    Right Forearm  3450 


 


  Oral  120 


 


Output:   


 


  Urine  700 


 


    Urethral (Jovel)  700 


 


  Stool  0 


 


  Emesis  0 


 


Other:   


 


  Voiding Method Urinal  














- Physical Exam


Head: Positive for: Atraumatic, Normocephalic


Pupils: Positive for: PERRL


Extroacular Muscles: Positive for: EOMI


Conjunctiva: Positive for: Normal


Mouth: Positive for: Dry


Pharnyx: Positive for: Normal


Respiratory/Chest: Positive for: Tachypneic (but improved).  Negative for: Good 

Air Exchange, Respiratory Distress, Retracting


Abdomen: Positive for: Normal Bowel Sounds.  Negative for: Tenderness, 

Distention, Peritoneal Signs


Lower Extremity: Positive for: Edema, Capillary Refill < 2 s, Other (Thickened 

skin noted to right lower extremity)


Neurological: Positive for: GCS=15, Speech Normal (able to speak in full 

sentences)


Skin: Positive for: Warm, Dry, Normal Color.  Negative for: Rashes


Psychiatric: Positive for: Alert, Oriented x 3, Normal Insight, Normal 

Concentration





- Medications


Active Medications: 


Active Medications











Generic Name Dose Route Start Last Admin





  Trade Name Freq  PRN Reason Stop Dose Admin


 


Albuterol/Ipratropium  3 ml  02/04/19 17:04  





  Duoneb 3 Mg/0.5 Mg (3 Ml) Ud  IH   





  Q2H PRN   





  Shortness of Breath   





     





     





     


 


Aspirin  81 mg  02/05/19 10:00  02/05/19 09:27





  Aspirin Chewable  PO   81 mg





  DAILY BRENT   Administration





     





     





     





     


 


Atorvastatin Calcium  40 mg  02/05/19 17:00  





  Lipitor  PO   





  DIN BRENT   





     





     





     





     


 


Calcium Acetate  2,001 mg  02/05/19 12:00  





  Phoslo  PO   





  WM BRENT   





     





     





     





     


 


Darbepoetin Balta  100 mcg  02/05/19 10:00  





  Aranesp  IVP   





  [facility]   





     





     





     





     


 


Heparin Sodium (Porcine)  5,000 units  02/04/19 22:00  02/05/19 05:02





  Heparin  SC   5,000 units





  Q8 BRENT   Administration





     





     





  Protocol   





     


 


Sodium Bicarbonate 150 meq/  1,150 mls @ 150 mls/hr  02/04/19 16:45  02/05/19 

09:43





  Sodium Chloride  IV   150 mls/hr





  .[id number] BRENT   Administration





     





     





     





     


 


Cefepime HCl  1 gm in 100 mls @ 100 mls/hr  02/04/19 18:45  02/04/19 20:27





  Maxipime 1gm  IVPB   100 mls/hr





  Q24H [facility]   Administration





     





     





  Protocol   





     


 


Pantoprazole Sodium  40 mg  02/05/19 10:00  02/05/19 09:02





  Protonix Inj  IVP   40 mg





  DAILY BRENT   Administration





     





     





     





     


 


Vitamin B Complex/Vit C/Folic Acid  1 tab  02/06/19 08:00  





  Nephro-Seda  PO   





  0800 [facility]   





     





     





     





     














- Patient Studies


Lab Studies: 


                                   Lab Studies











  02/05/19 02/05/19 02/05/19 Range/Units





  06:20 05:40 05:40 


 


WBC   12.3 H D   (4.5-11.0)  10^3/uL


 


RBC   2.76 L   (3.5-6.1)  10^6/uL


 


Hgb   7.7 L   (14.0-18.0)  g/dL


 


Hct   22.2 L   (42.0-52.0)  %


 


MCV   80.4   (80.0-105.0)  fl


 


MCH   27.9   (25.0-35.0)  pg


 


MCHC   34.7   (31.0-37.0)  g/dl


 


RDW   14.3   (11.5-14.5)  %


 


Plt Count   239   (120.0-450.0)  10^3/uL


 


MPV   9.4   (7.0-11.0)  fl


 


Neut % (Auto)   95.8 H   (50.0-68.0)  %


 


Lymph % (Auto)   3.4 L   (22.0-35.0)  %


 


Mono % (Auto)   0.7 L   (1.0-6.0)  %


 


Eos % (Auto)   0.1 L   (1.5-5.0)  %


 


Baso % (Auto)   0.0   (0.0-3.0)  %


 


Lymph # (Auto)   0.4 L   (1.2-3.4)  


 


Mono # (Auto)   0.1   (0.1-0.6)  


 


Eos # (Auto)   0.0   (0.0-0.7)  


 


Baso # (Auto)   0.00   (0.0-2.0)  K/mm3


 


Absolute Neuts (auto)   11.79 H   (1.4-6.5)  


 


Neutrophils % (Manual)     (50.0-70.0)  %


 


Band Neutrophils %     (0-2)  %


 


Lymphocytes % (Manual)     (22.0-35.0)  %


 


Monocytes % (Manual)     (1.0-6.0)  %


 


Metamyelocytes %     %


 


Platelet Evaluation     (NORMAL)  


 


PT     (9.4-12.5)  SECONDS


 


INR     


 


APTT     (26.9-38.3)  Seconds


 


D-Dimer, Quantitative     (0-243)  ng/mlDDU


 


pCO2  13 L*    (35-45)  mm/Hg


 


pO2  154.0 H    (30-55)  mm/Hg


 


HCO3  5.3 L*    (21-28)  mmol/L


 


ABG pH  7.22 L    (7.35-7.45)  


 


ABG Total CO2  5.7 L    (22-28)  mmol.L


 


ABG O2 Saturation  96.2    (95-98)  %


 


ABG O2 Content  9.0 L    (15-23)  ML/dl


 


ABG Base Excess  -20.5 L    (-2.0-3.0)  mmol/L


 


ABG Hemoglobin  6.4 L    (11.7-17.4)  g/dL


 


ABG Carboxyhemoglobin  0 L    (0.5-1.5)  %


 


POC ABG HHb (Measured)  3.8    (0-5)  %


 


ABG Methemoglobin  0.9    (0.0-3.0)  %


 


ABG O2 Capacity  9.4 L    (16-24)  mL/dl


 


VBG pH     (7.32-7.43)  


 


VBG pCO2     (40-60)  


 


VBG HCO3     (21-28)  mmol/l


 


VBG Total CO2     (22-28)  mmol.L


 


VBG O2 Sat (Calc)     (40-65)  %


 


VBG Base Excess     (0.0-2.0)  mmol/L


 


VBG Potassium     (3.6-5.2)  mmol/L


 


Hgb O2 Saturation  95.4    (95.0-98.0)  %


 


Sodium    136  (132-148)  mmol/L


 


Chloride    103  ()  mmol/L


 


Glucose     ()  mg/dl


 


Lactate     (0.7-2.1)  mmol/L


 


FiO2  28.0    %


 


Blood Gas Comments     


 


Crit Value Called To  Vickey moss rn icu    


 


Crit Value Called By  Tee    


 


Blood Gas Notified Time  633    


 


Potassium    4.7  (3.6-5.0)  mmol/L


 


Carbon Dioxide    7 L D  (21-33)  mmol/L


 


Anion Gap    30 H  (10-20)  


 


BUN    234 H*  (7-21)  mg/dL


 


Creatinine    16.6 H*  (0.8-1.5)  mg/dl


 


Est GFR (African Amer)    4  


 


Est GFR (Non-Af Amer)    3  


 


Random Glucose    155 H  ()  mg/dL


 


Calcium    6.5 L*  (8.4-10.5)  mg/dL


 


Phosphorus    11.9 H  (2.5-4.5)  mg/dL


 


Magnesium    1.7  (1.7-2.2)  mg/dL


 


Total Bilirubin    0.8  (0.2-1.3)  mg/dL


 


AST    39  (17-59)  U/L


 


ALT    23  (7-56)  U/L


 


Alkaline Phosphatase    86  ()  U/L


 


Total Creatine Kinase     ()  U/L


 


CK-MB (CK-2)     (0.0-3.6)  ng/mL


 


CK-MB (CK-2) %     (2.5-3.0)  %


 


Troponin I    0.95 H* D  ng/mL


 


NT-Pro-B Natriuret Pep     (0-450)  pg/mL


 


Total Protein    6.6  (5.8-8.3)  g/dL


 


Albumin    3.3  (3.0-4.8)  g/dL


 


Globulin    3.4  gm/dL


 


Albumin/Globulin Ratio    1.0 L  (1.1-1.8)  


 


Procalcitonin     (0.19-0.49)  NG/ML


 


Venous Blood Potassium     (3.6-5.2)  mmol/L


 


Urine Color     (YELLOW)  


 


Urine Appearance     (CLEAR)  


 


Urine pH     (4.7-8.0)  


 


Ur Specific Gravity     (1.005-1.035)  


 


Urine Protein     (<30 mg/dL)  mg/dL


 


Urine Glucose (UA)     (NEGATIVE)  mg/dL


 


Urine Ketones     (NEGATIVE)  mg/dL


 


Urine Blood     (NEGATIVE)  


 


Urine Nitrate     (NEGATIVE)  


 


Urine Bilirubin     (NEGATIVE)  


 


Urine Urobilinogen     (<1 E.U./dL)  E.U./dL


 


Ur Leukocyte Esterase     (NEGATIVE)  Virgie/uL


 


Urine RBC     (0-2)  /hpf


 


Urine WBC     (0-6)  /hpf


 


Ur Epithelial Cells     (0-5)  /hpf


 


Urine Bacteria     (NONE)  /hpf














  02/04/19 02/04/19 02/04/19 Range/Units





  23:15 22:42 18:14 


 


WBC    21.9 H  (4.5-11.0)  10^3/uL


 


RBC    3.04 L  (3.5-6.1)  10^6/uL


 


Hgb    8.6 L  (14.0-18.0)  g/dL


 


Hct    25.2 L  (42.0-52.0)  %


 


MCV    82.9  (80.0-105.0)  fl


 


MCH    28.3  (25.0-35.0)  pg


 


MCHC    34.1  (31.0-37.0)  g/dl


 


RDW    14.6 H  (11.5-14.5)  %


 


Plt Count    268  (120.0-450.0)  10^3/uL


 


MPV    9.7  (7.0-11.0)  fl


 


Neut % (Auto)    93.9 H  (50.0-68.0)  %


 


Lymph % (Auto)    4.4 L  (22.0-35.0)  %


 


Mono % (Auto)    1.6  (1.0-6.0)  %


 


Eos % (Auto)    0.0 L  (1.5-5.0)  %


 


Baso % (Auto)    0.1  (0.0-3.0)  %


 


Lymph # (Auto)    1.0 L  (1.2-3.4)  


 


Mono # (Auto)    0.4  (0.1-0.6)  


 


Eos # (Auto)    0.0  (0.0-0.7)  


 


Baso # (Auto)    0.03  (0.0-2.0)  K/mm3


 


Absolute Neuts (auto)    20.56 H  (1.4-6.5)  


 


Neutrophils % (Manual)     (50.0-70.0)  %


 


Band Neutrophils %     (0-2)  %


 


Lymphocytes % (Manual)     (22.0-35.0)  %


 


Monocytes % (Manual)     (1.0-6.0)  %


 


Metamyelocytes %     %


 


Platelet Evaluation     (NORMAL)  


 


PT     (9.4-12.5)  SECONDS


 


INR     


 


APTT     (26.9-38.3)  Seconds


 


D-Dimer, Quantitative     (0-243)  ng/mlDDU


 


pCO2     (35-45)  mm/Hg


 


pO2     (30-55)  mm/Hg


 


HCO3     (21-28)  mmol/L


 


ABG pH     (7.35-7.45)  


 


ABG Total CO2     (22-28)  mmol.L


 


ABG O2 Saturation     (95-98)  %


 


ABG O2 Content     (15-23)  ML/dl


 


ABG Base Excess     (-2.0-3.0)  mmol/L


 


ABG Hemoglobin     (11.7-17.4)  g/dL


 


ABG Carboxyhemoglobin     (0.5-1.5)  %


 


POC ABG HHb (Measured)     (0-5)  %


 


ABG Methemoglobin     (0.0-3.0)  %


 


ABG O2 Capacity     (16-24)  mL/dl


 


VBG pH     (7.32-7.43)  


 


VBG pCO2     (40-60)  


 


VBG HCO3     (21-28)  mmol/l


 


VBG Total CO2     (22-28)  mmol.L


 


VBG O2 Sat (Calc)     (40-65)  %


 


VBG Base Excess     (0.0-2.0)  mmol/L


 


VBG Potassium     (3.6-5.2)  mmol/L


 


Hgb O2 Saturation     (95.0-98.0)  %


 


Sodium     (132-148)  mmol/L


 


Chloride     ()  mmol/L


 


Glucose     ()  mg/dl


 


Lactate     (0.7-2.1)  mmol/L


 


FiO2     %


 


Blood Gas Comments     


 


Crit Value Called To     


 


Crit Value Called By     


 


Blood Gas Notified Time     


 


Potassium     (3.6-5.0)  mmol/L


 


Carbon Dioxide     (21-33)  mmol/L


 


Anion Gap     (10-20)  


 


BUN     (7-21)  mg/dL


 


Creatinine     (0.8-1.5)  mg/dl


 


Est GFR (African Amer)     


 


Est GFR (Non-Af Amer)     


 


Random Glucose     ()  mg/dL


 


Calcium     (8.4-10.5)  mg/dL


 


Phosphorus     (2.5-4.5)  mg/dL


 


Magnesium     (1.7-2.2)  mg/dL


 


Total Bilirubin     (0.2-1.3)  mg/dL


 


AST     (17-59)  U/L


 


ALT     (7-56)  U/L


 


Alkaline Phosphatase     ()  U/L


 


Total Creatine Kinase     ()  U/L


 


CK-MB (CK-2)     (0.0-3.6)  ng/mL


 


CK-MB (CK-2) %     (2.5-3.0)  %


 


Troponin I  0.27 H* D    ng/mL


 


NT-Pro-B Natriuret Pep     (0-450)  pg/mL


 


Total Protein     (5.8-8.3)  g/dL


 


Albumin     (3.0-4.8)  g/dL


 


Globulin     gm/dL


 


Albumin/Globulin Ratio     (1.1-1.8)  


 


Procalcitonin     (0.19-0.49)  NG/ML


 


Venous Blood Potassium     (3.6-5.2)  mmol/L


 


Urine Color   Light yellow   (YELLOW)  


 


Urine Appearance   Sl cloudy   (CLEAR)  


 


Urine pH   6.0   (4.7-8.0)  


 


Ur Specific Gravity   1.020   (1.005-1.035)  


 


Urine Protein   Trace H   (<30 mg/dL)  mg/dL


 


Urine Glucose (UA)   Negative   (NEGATIVE)  mg/dL


 


Urine Ketones   Negative   (NEGATIVE)  mg/dL


 


Urine Blood   Moderate H   (NEGATIVE)  


 


Urine Nitrate   Negative   (NEGATIVE)  


 


Urine Bilirubin   Negative   (NEGATIVE)  


 


Urine Urobilinogen   0.2   (<1 E.U./dL)  E.U./dL


 


Ur Leukocyte Esterase   Moderate H   (NEGATIVE)  Virgie/uL


 


Urine RBC   2 - 5 H   (0-2)  /hpf


 


Urine WBC   25 - 30 H   (0-6)  /hpf


 


Ur Epithelial Cells   0 - 2   (0-5)  /hpf


 


Urine Bacteria   Few   (NONE)  /hpf














  02/04/19 02/04/19 02/04/19 Range/Units





  18:14 18:00 17:56 


 


WBC     (4.5-11.0)  10^3/uL


 


RBC     (3.5-6.1)  10^6/uL


 


Hgb     (14.0-18.0)  g/dL


 


Hct     (42.0-52.0)  %


 


MCV     (80.0-105.0)  fl


 


MCH     (25.0-35.0)  pg


 


MCHC     (31.0-37.0)  g/dl


 


RDW     (11.5-14.5)  %


 


Plt Count     (120.0-450.0)  10^3/uL


 


MPV     (7.0-11.0)  fl


 


Neut % (Auto)     (50.0-68.0)  %


 


Lymph % (Auto)     (22.0-35.0)  %


 


Mono % (Auto)     (1.0-6.0)  %


 


Eos % (Auto)     (1.5-5.0)  %


 


Baso % (Auto)     (0.0-3.0)  %


 


Lymph # (Auto)     (1.2-3.4)  


 


Mono # (Auto)     (0.1-0.6)  


 


Eos # (Auto)     (0.0-0.7)  


 


Baso # (Auto)     (0.0-2.0)  K/mm3


 


Absolute Neuts (auto)     (1.4-6.5)  


 


Neutrophils % (Manual)     (50.0-70.0)  %


 


Band Neutrophils %     (0-2)  %


 


Lymphocytes % (Manual)     (22.0-35.0)  %


 


Monocytes % (Manual)     (1.0-6.0)  %


 


Metamyelocytes %     %


 


Platelet Evaluation     (NORMAL)  


 


PT     (9.4-12.5)  SECONDS


 


INR     


 


APTT     (26.9-38.3)  Seconds


 


D-Dimer, Quantitative     (0-243)  ng/mlDDU


 


pCO2     (35-45)  mm/Hg


 


pO2  110 H    (30-55)  mm/Hg


 


HCO3     (21-28)  mmol/L


 


ABG pH     (7.35-7.45)  


 


ABG Total CO2     (22-28)  mmol.L


 


ABG O2 Saturation     (95-98)  %


 


ABG O2 Content     (15-23)  ML/dl


 


ABG Base Excess     (-2.0-3.0)  mmol/L


 


ABG Hemoglobin     (11.7-17.4)  g/dL


 


ABG Carboxyhemoglobin     (0.5-1.5)  %


 


POC ABG HHb (Measured)     (0-5)  %


 


ABG Methemoglobin     (0.0-3.0)  %


 


ABG O2 Capacity     (16-24)  mL/dl


 


VBG pH  6.93 L*    (7.32-7.43)  


 


VBG pCO2  16.0 L*    (40-60)  


 


VBG HCO3  3.4 L    (21-28)  mmol/l


 


VBG Total CO2  3.9 L    (22-28)  mmol.L


 


VBG O2 Sat (Calc)  96.2 H    (40-65)  %


 


VBG Base Excess  -27.7 L    (0.0-2.0)  mmol/L


 


VBG Potassium  5.6 H    (3.6-5.2)  mmol/L


 


Hgb O2 Saturation     (95.0-98.0)  %


 


Sodium  126.0 L   131 L  (132-148)  mmol/L


 


Chloride  95.0 L   97 L  ()  mmol/L


 


Glucose  215 H    ()  mg/dl


 


Lactate  1.4    (0.7-2.1)  mmol/L


 


FiO2  21.0    %


 


Blood Gas Comments     


 


Crit Value Called To  Karma victoria    


 


Crit Value Called By  [facility]    


 


Blood Gas Notified Time  1825    


 


Potassium    5.4 H  (3.6-5.0)  mmol/L


 


Carbon Dioxide    < 5 L  (21-33)  mmol/L


 


Anion Gap    34 H  (10-20)  


 


BUN    230 H*  (7-21)  mg/dL


 


Creatinine    18.3 H*  (0.8-1.5)  mg/dl


 


Est GFR ( Amer)    3  


 


Est GFR (Non-Af Amer)    3  


 


Random Glucose    211 H  ()  mg/dL


 


Calcium    6.7 L*  (8.4-10.5)  mg/dL


 


Phosphorus    13.4 H  (2.5-4.5)  mg/dL


 


Magnesium    2.0  (1.7-2.2)  mg/dL


 


Total Bilirubin    0.9  (0.2-1.3)  mg/dL


 


AST    29  (17-59)  U/L


 


ALT    26  (7-56)  U/L


 


Alkaline Phosphatase    105  ()  U/L


 


Total Creatine Kinase     ()  U/L


 


CK-MB (CK-2)     (0.0-3.6)  ng/mL


 


CK-MB (CK-2) %     (2.5-3.0)  %


 


Troponin I    0.11  ng/mL


 


NT-Pro-B Natriuret Pep     (0-450)  pg/mL


 


Total Protein    7.3  (5.8-8.3)  g/dL


 


Albumin    3.6  (3.0-4.8)  g/dL


 


Globulin    3.7  gm/dL


 


Albumin/Globulin Ratio    1.0 L  (1.1-1.8)  


 


Procalcitonin   1.89 H   (0.19-0.49)  NG/ML


 


Venous Blood Potassium  5.6 H    (3.6-5.2)  mmol/L


 


Urine Color     (YELLOW)  


 


Urine Appearance     (CLEAR)  


 


Urine pH     (4.7-8.0)  


 


Ur Specific Gravity     (1.005-1.035)  


 


Urine Protein     (<30 mg/dL)  mg/dL


 


Urine Glucose (UA)     (NEGATIVE)  mg/dL


 


Urine Ketones     (NEGATIVE)  mg/dL


 


Urine Blood     (NEGATIVE)  


 


Urine Nitrate     (NEGATIVE)  


 


Urine Bilirubin     (NEGATIVE)  


 


Urine Urobilinogen     (<1 E.U./dL)  E.U./dL


 


Ur Leukocyte Esterase     (NEGATIVE)  Virgie/uL


 


Urine RBC     (0-2)  /hpf


 


Urine WBC     (0-6)  /hpf


 


Ur Epithelial Cells     (0-5)  /hpf


 


Urine Bacteria     (NONE)  /hpf














  02/04/19 02/04/19 02/04/19 Range/Units





  15:15 14:50 14:50 


 


WBC     (4.5-11.0)  10^3/uL


 


RBC     (3.5-6.1)  10^6/uL


 


Hgb     (14.0-18.0)  g/dL


 


Hct     (42.0-52.0)  %


 


MCV     (80.0-105.0)  fl


 


MCH     (25.0-35.0)  pg


 


MCHC     (31.0-37.0)  g/dl


 


RDW     (11.5-14.5)  %


 


Plt Count     (120.0-450.0)  10^3/uL


 


MPV     (7.0-11.0)  fl


 


Neut % (Auto)     (50.0-68.0)  %


 


Lymph % (Auto)     (22.0-35.0)  %


 


Mono % (Auto)     (1.0-6.0)  %


 


Eos % (Auto)     (1.5-5.0)  %


 


Baso % (Auto)     (0.0-3.0)  %


 


Lymph # (Auto)     (1.2-3.4)  


 


Mono # (Auto)     (0.1-0.6)  


 


Eos # (Auto)     (0.0-0.7)  


 


Baso # (Auto)     (0.0-2.0)  K/mm3


 


Absolute Neuts (auto)     (1.4-6.5)  


 


Neutrophils % (Manual)     (50.0-70.0)  %


 


Band Neutrophils %     (0-2)  %


 


Lymphocytes % (Manual)     (22.0-35.0)  %


 


Monocytes % (Manual)     (1.0-6.0)  %


 


Metamyelocytes %     %


 


Platelet Evaluation     (NORMAL)  


 


PT     (9.4-12.5)  SECONDS


 


INR     


 


APTT     (26.9-38.3)  Seconds


 


D-Dimer, Quantitative     (0-243)  ng/mlDDU


 


pCO2  13 L*    (35-45)  mm/Hg


 


pO2  142.0 H   215 H  (30-55)  mm/Hg


 


HCO3  3.1 L*    (21-28)  mmol/L


 


ABG pH  6.98 L*    (7.35-7.45)  


 


ABG Total CO2  3.5 L    (22-28)  mmol.L


 


ABG O2 Saturation  96.7    (95-98)  %


 


ABG O2 Content  12.0 L    (15-23)  ML/dl


 


ABG Base Excess  -26.5 L    (-2.0-3.0)  mmol/L


 


ABG Hemoglobin  8.7 L    (11.7-17.4)  g/dL


 


ABG Carboxyhemoglobin  0 L    (0.5-1.5)  %


 


POC ABG HHb (Measured)  3.3    (0-5)  %


 


ABG Methemoglobin  1.0    (0.0-3.0)  %


 


ABG O2 Capacity  12.4 L    (16-24)  mL/dl


 


VBG pH    6.94 L*  (7.32-7.43)  


 


VBG pCO2    17.0 L*  (40-60)  


 


VBG HCO3    3.7 L  (21-28)  mmol/l


 


VBG Total CO2    4.2 L  (22-28)  mmol.L


 


VBG O2 Sat (Calc)    96.8 H  (40-65)  %


 


VBG Base Excess    -27.2 L  (0.0-2.0)  mmol/L


 


VBG Potassium    5.6 H  (3.6-5.2)  mmol/L


 


Hgb O2 Saturation  95.6    (95.0-98.0)  %


 


Sodium   131 L  125.0 L  (132-148)  mmol/L


 


Chloride   96 L  94.0 L  ()  mmol/L


 


Glucose    140 H  ()  mg/dl


 


Lactate    1.2  (0.7-2.1)  mmol/L


 


FiO2  28.0   21.0  %


 


Blood Gas Comments  Walked to er md    


 


Crit Value Called To  harish Nunn  


 


Crit Value Called By     Atc  


 


Blood Gas Notified Time  1522   1505  


 


Potassium   5.7 H*   (3.6-5.0)  mmol/L


 


Carbon Dioxide   5 L   (21-33)  mmol/L


 


Anion Gap   36 H   (10-20)  


 


BUN   232 H*   (7-21)  mg/dL


 


Creatinine   18.1 H*   (0.8-1.5)  mg/dl


 


Est GFR ( Amer)   3   


 


Est GFR (Non-Af Amer)   3   


 


Random Glucose   136 H   ()  mg/dL


 


Calcium   7.0 L   (8.4-10.5)  mg/dL


 


Phosphorus     (2.5-4.5)  mg/dL


 


Magnesium   1.9   (1.7-2.2)  mg/dL


 


Total Bilirubin   0.8   (0.2-1.3)  mg/dL


 


AST   34   (17-59)  U/L


 


ALT   23   (7-56)  U/L


 


Alkaline Phosphatase   109   ()  U/L


 


Total Creatine Kinase   477 H   ()  U/L


 


CK-MB (CK-2)   25.0 H   (0.0-3.6)  ng/mL


 


CK-MB (CK-2) %   5.2 H   (2.5-3.0)  %


 


Troponin I     ng/mL


 


NT-Pro-B Natriuret Pep   93700 H   (0-450)  pg/mL


 


Total Protein   7.7   (5.8-8.3)  g/dL


 


Albumin   3.8   (3.0-4.8)  g/dL


 


Globulin   4.0   gm/dL


 


Albumin/Globulin Ratio   1.0 L   (1.1-1.8)  


 


Procalcitonin     (0.19-0.49)  NG/ML


 


Venous Blood Potassium    5.6 H  (3.6-5.2)  mmol/L


 


Urine Color     (YELLOW)  


 


Urine Appearance     (CLEAR)  


 


Urine pH     (4.7-8.0)  


 


Ur Specific Gravity     (1.005-1.035)  


 


Urine Protein     (<30 mg/dL)  mg/dL


 


Urine Glucose (UA)     (NEGATIVE)  mg/dL


 


Urine Ketones     (NEGATIVE)  mg/dL


 


Urine Blood     (NEGATIVE)  


 


Urine Nitrate     (NEGATIVE)  


 


Urine Bilirubin     (NEGATIVE)  


 


Urine Urobilinogen     (<1 E.U./dL)  E.U./dL


 


Ur Leukocyte Esterase     (NEGATIVE)  Virgie/uL


 


Urine RBC     (0-2)  /hpf


 


Urine WBC     (0-6)  /hpf


 


Ur Epithelial Cells     (0-5)  /hpf


 


Urine Bacteria     (NONE)  /hpf














  02/04/19 02/04/19 Range/Units





  14:50 14:50 


 


WBC   23.3 H  (4.5-11.0)  10^3/uL


 


RBC   3.16 L  (3.5-6.1)  10^6/uL


 


Hgb   9.0 L  (14.0-18.0)  g/dL


 


Hct   26.2 L  (42.0-52.0)  %


 


MCV   82.9  (80.0-105.0)  fl


 


MCH   28.5  (25.0-35.0)  pg


 


MCHC   34.4  (31.0-37.0)  g/dl


 


RDW   14.5  (11.5-14.5)  %


 


Plt Count   264  (120.0-450.0)  10^3/uL


 


MPV   9.5  (7.0-11.0)  fl


 


Neut % (Auto)   91.4 H  (50.0-68.0)  %


 


Lymph % (Auto)   6.7 L  (22.0-35.0)  %


 


Mono % (Auto)   1.5  (1.0-6.0)  %


 


Eos % (Auto)   0.2 L  (1.5-5.0)  %


 


Baso % (Auto)   0.2  (0.0-3.0)  %


 


Lymph # (Auto)   1.6  (1.2-3.4)  


 


Mono # (Auto)   0.4  (0.1-0.6)  


 


Eos # (Auto)   0.0  (0.0-0.7)  


 


Baso # (Auto)   0.05  (0.0-2.0)  K/mm3


 


Absolute Neuts (auto)   21.29 H  (1.4-6.5)  


 


Neutrophils % (Manual)   85 H  (50.0-70.0)  %


 


Band Neutrophils %   4 H  (0-2)  %


 


Lymphocytes % (Manual)   4 L  (22.0-35.0)  %


 


Monocytes % (Manual)   6  (1.0-6.0)  %


 


Metamyelocytes %   1  %


 


Platelet Evaluation   Normal  (NORMAL)  


 


PT  14.3 H   (9.4-12.5)  SECONDS


 


INR  1.29   


 


APTT  38.1   (26.9-38.3)  Seconds


 


D-Dimer, Quantitative  5760 H   (0-243)  ng/mlDDU


 


pCO2    (35-45)  mm/Hg


 


pO2    (30-55)  mm/Hg


 


HCO3    (21-28)  mmol/L


 


ABG pH    (7.35-7.45)  


 


ABG Total CO2    (22-28)  mmol.L


 


ABG O2 Saturation    (95-98)  %


 


ABG O2 Content    (15-23)  ML/dl


 


ABG Base Excess    (-2.0-3.0)  mmol/L


 


ABG Hemoglobin    (11.7-17.4)  g/dL


 


ABG Carboxyhemoglobin    (0.5-1.5)  %


 


POC ABG HHb (Measured)    (0-5)  %


 


ABG Methemoglobin    (0.0-3.0)  %


 


ABG O2 Capacity    (16-24)  mL/dl


 


VBG pH    (7.32-7.43)  


 


VBG pCO2    (40-60)  


 


VBG HCO3    (21-28)  mmol/l


 


VBG Total CO2    (22-28)  mmol.L


 


VBG O2 Sat (Calc)    (40-65)  %


 


VBG Base Excess    (0.0-2.0)  mmol/L


 


VBG Potassium    (3.6-5.2)  mmol/L


 


Hgb O2 Saturation    (95.0-98.0)  %


 


Sodium    (132-148)  mmol/L


 


Chloride    ()  mmol/L


 


Glucose    ()  mg/dl


 


Lactate    (0.7-2.1)  mmol/L


 


FiO2    %


 


Blood Gas Comments    


 


Crit Value Called To    


 


Crit Value Called By    


 


Blood Gas Notified Time    


 


Potassium    (3.6-5.0)  mmol/L


 


Carbon Dioxide    (21-33)  mmol/L


 


Anion Gap    (10-20)  


 


BUN    (7-21)  mg/dL


 


Creatinine    (0.8-1.5)  mg/dl


 


Est GFR (African Amer)    


 


Est GFR (Non-Af Amer)    


 


Random Glucose    ()  mg/dL


 


Calcium    (8.4-10.5)  mg/dL


 


Phosphorus    (2.5-4.5)  mg/dL


 


Magnesium    (1.7-2.2)  mg/dL


 


Total Bilirubin    (0.2-1.3)  mg/dL


 


AST    (17-59)  U/L


 


ALT    (7-56)  U/L


 


Alkaline Phosphatase    ()  U/L


 


Total Creatine Kinase    ()  U/L


 


CK-MB (CK-2)    (0.0-3.6)  ng/mL


 


CK-MB (CK-2) %    (2.5-3.0)  %


 


Troponin I    ng/mL


 


NT-Pro-B Natriuret Pep    (0-450)  pg/mL


 


Total Protein    (5.8-8.3)  g/dL


 


Albumin    (3.0-4.8)  g/dL


 


Globulin    gm/dL


 


Albumin/Globulin Ratio    (1.1-1.8)  


 


Procalcitonin    (0.19-0.49)  NG/ML


 


Venous Blood Potassium    (3.6-5.2)  mmol/L


 


Urine Color    (YELLOW)  


 


Urine Appearance    (CLEAR)  


 


Urine pH    (4.7-8.0)  


 


Ur Specific Gravity    (1.005-1.035)  


 


Urine Protein    (<30 mg/dL)  mg/dL


 


Urine Glucose (UA)    (NEGATIVE)  mg/dL


 


Urine Ketones    (NEGATIVE)  mg/dL


 


Urine Blood    (NEGATIVE)  


 


Urine Nitrate    (NEGATIVE)  


 


Urine Bilirubin    (NEGATIVE)  


 


Urine Urobilinogen    (<1 E.U./dL)  E.U./dL


 


Ur Leukocyte Esterase    (NEGATIVE)  Virgie/uL


 


Urine RBC    (0-2)  /hpf


 


Urine WBC    (0-6)  /hpf


 


Ur Epithelial Cells    (0-5)  /hpf


 


Urine Bacteria    (NONE)  /hpf








                         Laboratory Results - last 24 hr











  02/04/19 02/04/19 02/04/19





  14:50 14:50 14:50


 


WBC  23.3 H  


 


RBC  3.16 L  


 


Hgb  9.0 L  


 


Hct  26.2 L  


 


MCV  82.9  


 


MCH  28.5  


 


MCHC  34.4  


 


RDW  14.5  


 


Plt Count  264  


 


MPV  9.5  


 


Neut % (Auto)  91.4 H  


 


Lymph % (Auto)  6.7 L  


 


Mono % (Auto)  1.5  


 


Eos % (Auto)  0.2 L  


 


Baso % (Auto)  0.2  


 


Lymph # (Auto)  1.6  


 


Mono # (Auto)  0.4  


 


Eos # (Auto)  0.0  


 


Baso # (Auto)  0.05  


 


Absolute Neuts (auto)  21.29 H  


 


Neutrophils % (Manual)  85 H  


 


Band Neutrophils %  4 H  


 


Lymphocytes % (Manual)  4 L  


 


Monocytes % (Manual)  6  


 


Metamyelocytes %  1  


 


Platelet Evaluation  Normal  


 


PT   14.3 H 


 


INR   1.29 


 


APTT   38.1 


 


D-Dimer, Quantitative   5760 H 


 


pCO2   


 


pO2    215 H


 


HCO3   


 


ABG pH   


 


ABG Total CO2   


 


ABG O2 Saturation   


 


ABG O2 Content   


 


ABG Base Excess   


 


ABG Hemoglobin   


 


ABG Carboxyhemoglobin   


 


POC ABG HHb (Measured)   


 


ABG Methemoglobin   


 


ABG O2 Capacity   


 


VBG pH    6.94 L*


 


VBG pCO2    17.0 L*


 


VBG HCO3    3.7 L


 


VBG Total CO2    4.2 L


 


VBG O2 Sat (Calc)    96.8 H


 


VBG Base Excess    -27.2 L


 


VBG Potassium    5.6 H


 


Hgb O2 Saturation   


 


Sodium    125.0 L


 


Chloride    94.0 L


 


Glucose    140 H


 


Lactate    1.2


 


FiO2    21.0


 


Blood Gas Comments   


 


Crit Value Called To    Kasey ramírez


 


Crit Value Called By    Atc


 


Blood Gas Notified Time    1505


 


Potassium   


 


Carbon Dioxide   


 


Anion Gap   


 


BUN   


 


Creatinine   


 


Est GFR ( Amer)   


 


Est GFR (Non-Af Amer)   


 


Random Glucose   


 


Calcium   


 


Phosphorus   


 


Magnesium   


 


Total Bilirubin   


 


AST   


 


ALT   


 


Alkaline Phosphatase   


 


Total Creatine Kinase   


 


CK-MB (CK-2)   


 


CK-MB (CK-2) %   


 


Troponin I   


 


NT-Pro-B Natriuret Pep   


 


Total Protein   


 


Albumin   


 


Globulin   


 


Albumin/Globulin Ratio   


 


Procalcitonin   


 


Venous Blood Potassium    5.6 H


 


Urine Color   


 


Urine Appearance   


 


Urine pH   


 


Ur Specific Gravity   


 


Urine Protein   


 


Urine Glucose (UA)   


 


Urine Ketones   


 


Urine Blood   


 


Urine Nitrate   


 


Urine Bilirubin   


 


Urine Urobilinogen   


 


Ur Leukocyte Esterase   


 


Urine RBC   


 


Urine WBC   


 


Ur Epithelial Cells   


 


Urine Bacteria   














  02/04/19 02/04/19 02/04/19





  14:50 15:15 17:56


 


WBC   


 


RBC   


 


Hgb   


 


Hct   


 


MCV   


 


MCH   


 


MCHC   


 


RDW   


 


Plt Count   


 


MPV   


 


Neut % (Auto)   


 


Lymph % (Auto)   


 


Mono % (Auto)   


 


Eos % (Auto)   


 


Baso % (Auto)   


 


Lymph # (Auto)   


 


Mono # (Auto)   


 


Eos # (Auto)   


 


Baso # (Auto)   


 


Absolute Neuts (auto)   


 


Neutrophils % (Manual)   


 


Band Neutrophils %   


 


Lymphocytes % (Manual)   


 


Monocytes % (Manual)   


 


Metamyelocytes %   


 


Platelet Evaluation   


 


PT   


 


INR   


 


APTT   


 


D-Dimer, Quantitative   


 


pCO2   13 L* 


 


pO2   142.0 H 


 


HCO3   3.1 L* 


 


ABG pH   6.98 L* 


 


ABG Total CO2   3.5 L 


 


ABG O2 Saturation   96.7 


 


ABG O2 Content   12.0 L 


 


ABG Base Excess   -26.5 L 


 


ABG Hemoglobin   8.7 L 


 


ABG Carboxyhemoglobin   0 L 


 


POC ABG HHb (Measured)   3.3 


 


ABG Methemoglobin   1.0 


 


ABG O2 Capacity   12.4 L 


 


VBG pH   


 


VBG pCO2   


 


VBG HCO3   


 


VBG Total CO2   


 


VBG O2 Sat (Calc)   


 


VBG Base Excess   


 


VBG Potassium   


 


Hgb O2 Saturation   95.6 


 


Sodium  131 L   131 L


 


Chloride  96 L   97 L


 


Glucose   


 


Lactate   


 


FiO2   28.0 


 


Blood Gas Comments   Walked to er md 


 


Crit Value Called To   harish Nunn 


 


Crit Value Called By   Ec 


 


Blood Gas Notified Time   1522 


 


Potassium  5.7 H*   5.4 H


 


Carbon Dioxide  5 L   < 5 L


 


Anion Gap  36 H   34 H


 


BUN  232 H*   230 H*


 


Creatinine  18.1 H*   18.3 H*


 


Est GFR ( Amer)  3   3


 


Est GFR (Non-Af Amer)  3   3


 


Random Glucose  136 H   211 H


 


Calcium  7.0 L   6.7 L*


 


Phosphorus    13.4 H


 


Magnesium  1.9   2.0


 


Total Bilirubin  0.8   0.9


 


AST  34   29


 


ALT  23   26


 


Alkaline Phosphatase  109   105


 


Total Creatine Kinase  477 H  


 


CK-MB (CK-2)  25.0 H  


 


CK-MB (CK-2) %  5.2 H  


 


Troponin I    0.11


 


NT-Pro-B Natriuret Pep  30847 H  


 


Total Protein  7.7   7.3


 


Albumin  3.8   3.6


 


Globulin  4.0   3.7


 


Albumin/Globulin Ratio  1.0 L   1.0 L


 


Procalcitonin   


 


Venous Blood Potassium   


 


Urine Color   


 


Urine Appearance   


 


Urine pH   


 


Ur Specific Gravity   


 


Urine Protein   


 


Urine Glucose (UA)   


 


Urine Ketones   


 


Urine Blood   


 


Urine Nitrate   


 


Urine Bilirubin   


 


Urine Urobilinogen   


 


Ur Leukocyte Esterase   


 


Urine RBC   


 


Urine WBC   


 


Ur Epithelial Cells   


 


Urine Bacteria   














  02/04/19 02/04/19 02/04/19





  18:00 18:14 18:14


 


WBC    21.9 H


 


RBC    3.04 L


 


Hgb    8.6 L


 


Hct    25.2 L


 


MCV    82.9


 


MCH    28.3


 


MCHC    34.1


 


RDW    14.6 H


 


Plt Count    268


 


MPV    9.7


 


Neut % (Auto)    93.9 H


 


Lymph % (Auto)    4.4 L


 


Mono % (Auto)    1.6


 


Eos % (Auto)    0.0 L


 


Baso % (Auto)    0.1


 


Lymph # (Auto)    1.0 L


 


Mono # (Auto)    0.4


 


Eos # (Auto)    0.0


 


Baso # (Auto)    0.03


 


Absolute Neuts (auto)    20.56 H


 


Neutrophils % (Manual)   


 


Band Neutrophils %   


 


Lymphocytes % (Manual)   


 


Monocytes % (Manual)   


 


Metamyelocytes %   


 


Platelet Evaluation   


 


PT   


 


INR   


 


APTT   


 


D-Dimer, Quantitative   


 


pCO2   


 


pO2   110 H 


 


HCO3   


 


ABG pH   


 


ABG Total CO2   


 


ABG O2 Saturation   


 


ABG O2 Content   


 


ABG Base Excess   


 


ABG Hemoglobin   


 


ABG Carboxyhemoglobin   


 


POC ABG HHb (Measured)   


 


ABG Methemoglobin   


 


ABG O2 Capacity   


 


VBG pH   6.93 L* 


 


VBG pCO2   16.0 L* 


 


VBG HCO3   3.4 L 


 


VBG Total CO2   3.9 L 


 


VBG O2 Sat (Calc)   96.2 H 


 


VBG Base Excess   -27.7 L 


 


VBG Potassium   5.6 H 


 


Hgb O2 Saturation   


 


Sodium   126.0 L 


 


Chloride   95.0 L 


 


Glucose   215 H 


 


Lactate   1.4 


 


FiO2   21.0 


 


Blood Gas Comments   


 


Crit Value Called To   Karma victoria 


 


Crit Value Called By   [facility] 


 


Blood Gas Notified Time   1825 


 


Potassium   


 


Carbon Dioxide   


 


Anion Gap   


 


BUN   


 


Creatinine   


 


Est GFR ( Amer)   


 


Est GFR (Non-Af Amer)   


 


Random Glucose   


 


Calcium   


 


Phosphorus   


 


Magnesium   


 


Total Bilirubin   


 


AST   


 


ALT   


 


Alkaline Phosphatase   


 


Total Creatine Kinase   


 


CK-MB (CK-2)   


 


CK-MB (CK-2) %   


 


Troponin I   


 


NT-Pro-B Natriuret Pep   


 


Total Protein   


 


Albumin   


 


Globulin   


 


Albumin/Globulin Ratio   


 


Procalcitonin  1.89 H  


 


Venous Blood Potassium   5.6 H 


 


Urine Color   


 


Urine Appearance   


 


Urine pH   


 


Ur Specific Gravity   


 


Urine Protein   


 


Urine Glucose (UA)   


 


Urine Ketones   


 


Urine Blood   


 


Urine Nitrate   


 


Urine Bilirubin   


 


Urine Urobilinogen   


 


Ur Leukocyte Esterase   


 


Urine RBC   


 


Urine WBC   


 


Ur Epithelial Cells   


 


Urine Bacteria   














  02/04/19 02/04/19 02/05/19





  22:42 23:15 05:40


 


WBC   


 


RBC   


 


Hgb   


 


Hct   


 


MCV   


 


MCH   


 


MCHC   


 


RDW   


 


Plt Count   


 


MPV   


 


Neut % (Auto)   


 


Lymph % (Auto)   


 


Mono % (Auto)   


 


Eos % (Auto)   


 


Baso % (Auto)   


 


Lymph # (Auto)   


 


Mono # (Auto)   


 


Eos # (Auto)   


 


Baso # (Auto)   


 


Absolute Neuts (auto)   


 


Neutrophils % (Manual)   


 


Band Neutrophils %   


 


Lymphocytes % (Manual)   


 


Monocytes % (Manual)   


 


Metamyelocytes %   


 


Platelet Evaluation   


 


PT   


 


INR   


 


APTT   


 


D-Dimer, Quantitative   


 


pCO2   


 


pO2   


 


HCO3   


 


ABG pH   


 


ABG Total CO2   


 


ABG O2 Saturation   


 


ABG O2 Content   


 


ABG Base Excess   


 


ABG Hemoglobin   


 


ABG Carboxyhemoglobin   


 


POC ABG HHb (Measured)   


 


ABG Methemoglobin   


 


ABG O2 Capacity   


 


VBG pH   


 


VBG pCO2   


 


VBG HCO3   


 


VBG Total CO2   


 


VBG O2 Sat (Calc)   


 


VBG Base Excess   


 


VBG Potassium   


 


Hgb O2 Saturation   


 


Sodium    136


 


Chloride    103


 


Glucose   


 


Lactate   


 


FiO2   


 


Blood Gas Comments   


 


Crit Value Called To   


 


Crit Value Called By   


 


Blood Gas Notified Time   


 


Potassium    4.7


 


Carbon Dioxide    7 L D


 


Anion Gap    30 H


 


BUN    234 H*


 


Creatinine    16.6 H*


 


Est GFR ( Amer)    4


 


Est GFR (Non-Af Amer)    3


 


Random Glucose    155 H


 


Calcium    6.5 L*


 


Phosphorus    11.9 H


 


Magnesium    1.7


 


Total Bilirubin    0.8


 


AST    39


 


ALT    23


 


Alkaline Phosphatase    86


 


Total Creatine Kinase   


 


CK-MB (CK-2)   


 


CK-MB (CK-2) %   


 


Troponin I   0.27 H* D  0.95 H* D


 


NT-Pro-B Natriuret Pep   


 


Total Protein    6.6


 


Albumin    3.3


 


Globulin    3.4


 


Albumin/Globulin Ratio    1.0 L


 


Procalcitonin   


 


Venous Blood Potassium   


 


Urine Color  Light yellow  


 


Urine Appearance  Sl cloudy  


 


Urine pH  6.0  


 


Ur Specific Gravity  1.020  


 


Urine Protein  Trace H  


 


Urine Glucose (UA)  Negative  


 


Urine Ketones  Negative  


 


Urine Blood  Moderate H  


 


Urine Nitrate  Negative  


 


Urine Bilirubin  Negative  


 


Urine Urobilinogen  0.2  


 


Ur Leukocyte Esterase  Moderate H  


 


Urine RBC  2 - 5 H  


 


Urine WBC  25 - 30 H  


 


Ur Epithelial Cells  0 - 2  


 


Urine Bacteria  Few  














  02/05/19 02/05/19





  05:40 06:20


 


WBC  12.3 H D 


 


RBC  2.76 L 


 


Hgb  7.7 L 


 


Hct  22.2 L 


 


MCV  80.4 


 


MCH  27.9 


 


MCHC  34.7 


 


RDW  14.3 


 


Plt Count  239 


 


MPV  9.4 


 


Neut % (Auto)  95.8 H 


 


Lymph % (Auto)  3.4 L 


 


Mono % (Auto)  0.7 L 


 


Eos % (Auto)  0.1 L 


 


Baso % (Auto)  0.0 


 


Lymph # (Auto)  0.4 L 


 


Mono # (Auto)  0.1 


 


Eos # (Auto)  0.0 


 


Baso # (Auto)  0.00 


 


Absolute Neuts (auto)  11.79 H 


 


Neutrophils % (Manual)  


 


Band Neutrophils %  


 


Lymphocytes % (Manual)  


 


Monocytes % (Manual)  


 


Metamyelocytes %  


 


Platelet Evaluation  


 


PT  


 


INR  


 


APTT  


 


D-Dimer, Quantitative  


 


pCO2   13 L*


 


pO2   154.0 H


 


HCO3   5.3 L*


 


ABG pH   7.22 L


 


ABG Total CO2   5.7 L


 


ABG O2 Saturation   96.2


 


ABG O2 Content   9.0 L


 


ABG Base Excess   -20.5 L


 


ABG Hemoglobin   6.4 L


 


ABG Carboxyhemoglobin   0 L


 


POC ABG HHb (Measured)   3.8


 


ABG Methemoglobin   0.9


 


ABG O2 Capacity   9.4 L


 


VBG pH  


 


VBG pCO2  


 


VBG HCO3  


 


VBG Total CO2  


 


VBG O2 Sat (Calc)  


 


VBG Base Excess  


 


VBG Potassium  


 


Hgb O2 Saturation   95.4


 


Sodium  


 


Chloride  


 


Glucose  


 


Lactate  


 


FiO2   28.0


 


Blood Gas Comments  


 


Crit Value Called To   Vickey moss rn icu


 


Crit Value Called By   Tee


 


Blood Gas Notified Time   633


 


Potassium  


 


Carbon Dioxide  


 


Anion Gap  


 


BUN  


 


Creatinine  


 


Est GFR ( Amer)  


 


Est GFR (Non-Af Amer)  


 


Random Glucose  


 


Calcium  


 


Phosphorus  


 


Magnesium  


 


Total Bilirubin  


 


AST  


 


ALT  


 


Alkaline Phosphatase  


 


Total Creatine Kinase  


 


CK-MB (CK-2)  


 


CK-MB (CK-2) %  


 


Troponin I  


 


NT-Pro-B Natriuret Pep  


 


Total Protein  


 


Albumin  


 


Globulin  


 


Albumin/Globulin Ratio  


 


Procalcitonin  


 


Venous Blood Potassium  


 


Urine Color  


 


Urine Appearance  


 


Urine pH  


 


Ur Specific Gravity  


 


Urine Protein  


 


Urine Glucose (UA)  


 


Urine Ketones  


 


Urine Blood  


 


Urine Nitrate  


 


Urine Bilirubin  


 


Urine Urobilinogen  


 


Ur Leukocyte Esterase  


 


Urine RBC  


 


Urine WBC  


 


Ur Epithelial Cells  


 


Urine Bacteria  











Radiology Impressions: 


                              Radiology Impressions





Chest X-Ray  02/04/19 14:13


IMPRESSION:


No active disease. 


 


 











EKG/Cardiology Studies: 


Cardiology / EKG Studies





02/04/19 14:08


EKG [ELECTROCARDIOGRAM] Stat 


   Comment: 


   Reason For Exam: SOB











Fingerstick Blood Sugar Results: 136





Review of Systems





- Review of Systems


Review of Systems: 


except as mentioned in HPI








Critical Care Progress Note





- Ventilator Checklist


Head of Bed 30 Degrees: Yes


PUD Prophalyxis: Yes


DVT Prophylaxis: Yes





- Nutrition


Nutrition: 


                                    Nutrition











 Category Date Time Status


 


 NPO Diet [DIET] Diets  02/04/19 Dinner Ordered














Assessment/Plan





- Assessment and Plan (Free Text)


Assessment: 


68 year old male with past medical history of arthritis presents with acute 

renal failure likely 2/2 to acute tubular necrosis vs. postobstructive 

nephropathy from dehydration. Patient is currently on sodium bicarbonate drip 

due to renal failure. Plan is for dialysis today.





Plan: 


Neuro: 


  


-AAOx3, no FND, moving extremities past midline.   


-Monitor neuro status.    


-Reorient patient as necessary.   





Cardio:   


Elevated BNP 2/2 to CHF vs. pulmonary etiology


-BNP: 27288


-EKG: NSR with 1st degree AV block


-Echocardiogram: pending


-Troponinx3:  0.11, 0.27, 0.97 trending up


-Started on aspirin, lipitor


-Maintain MAP>65.    


-Monitor for S/S, HD compromise.   





Pulm: 


Tachypnea due to metabolic acidosis


-ABG 2/5: improved with pH: 7.22, pO2: 154, pCO2: 13


-CXR: no active disease


-Maintain O2 saturation>90%.   


-Head of bed to 30 degrees  


-Conservative fluid management  


-Maintain oral hygiene  


History of tobacco abuse


-Duonebs Q2 PRN for shortness of breath





GI:   


Diet 


-NPO


GI prophylaxis  


-Protonix 40 mg daily





/Nephro:   


Acute Renal Failure 2/2 to Acute Tubular Necrosis vs. Postobstructive 

Nephropathy


-BUN/Cr elevated at 234/16.6 from 18.3 with unknown baseline creatinine


-CK: elevated 477


-Sparse urine output at this time


-Strict I and O with jovel. Measure daily weight


-3 L of NS already administered


-Continue Sodium bicarbonate drip with 3 amps


-Started aranesp and phoslo


-Started flomax for post-obstructive nephropathy causes


-Dialysis scheduled for today.


-Follow up renal ultrasound and bladder scan.


-Replete electrolytes as needed.   


-Maintain euvolemia.   


Hyperkalemia


-K: 4.7





Endocrinology:   





-Random glucose: 155


-Maintain euglycemia.   





Heme/Onc:   


Anemia


-H/H stable at 7.7/27.2


-No signs of HD compromise.   


-Continue monitoring H/H  


Elevated D-dimer


-Rule out DVT with bilateral lower extremity ultrasound


-Avoid CTA to evaluate for PE due to acute renal failure


-Avoid V/Q scan to evaluate for PE due to current tachypnea. 


DVT prophylaxis  


-heparin 5000 U Q8





ID:   





-Afebrile, leukocytosis improved at 12.3


-Blood culture: follow up


-UA: positive LE, negative nitrite, 2-5 RBC, 25-30 WBC, few bacteria


-Urine Culture: follow up


-Procalcitonin: 1.89


-Lactate: 1.2


-Vancomycin and cefepime day 1 for unlikely pneumonia


-Monitor for signs and symptoms of infection.   





Patient seen and examined with Dr. Allen








- Date & Time


Date: 02/05/19


Time: 10:26





<Dakota Allen - Last Filed: 02/05/19 15:48>





CCU Objective





- Vital Signs / Intake & Output


Vital Signs (Last 4 hours): 


Vital Signs











  Temp Pulse Resp BP Pulse Ox


 


 02/05/19 15:30  98.4 F  100 H  22  100/67  93 L


 


 02/05/19 15:23  98.6 F  104 H  12  103/67  92 L


 


 02/05/19 15:18  98.6 F  96 H  11 L  108/82  93 L


 


 02/05/19 15:15  98.6 F  98 H  11 L  106/57 L  94 L


 


 02/05/19 15:09  98.6 F  96 H  14  102/68  91 L


 


 02/05/19 15:04  98.6 F  100 H  27 H  94/62 L  91 L


 


 02/05/19 15:02  98.6 F  95 H  70 H  92/51 L  77 L


 


 02/05/19 15:00  98.6 F  99 H  16  85/62 L  85 L


 


 02/05/19 14:45  98.6 F  101 H  18  120/81  97


 


 02/05/19 14:30  98.8 F  103 H  16  100/62  97


 


 02/05/19 14:15  98.8 F  102 H  24  115/64  97


 


 02/05/19 14:00  98.8 F  98 H  21  106/63  97


 


 02/05/19 13:56  98.8 F  100 H  18  110/65  96


 


 02/05/19 13:45  98.8 F  100 H  17  98/54 L  97


 


 02/05/19 13:30  99.0 F  100 H  16  118/65  96


 


 02/05/19 13:17  99.0 F  101 H  24  116/60  92 L


 


 02/05/19 13:00  99.0 F  104 H  15  120/62  96


 


 02/05/19 12:00  99.1 F  105 H  14  130/67  99











Intake and Output (Last 8hrs): 


                                 Intake & Output











 02/05/19 02/05/19 02/05/19





 06:59 14:59 22:59


 


Intake Total 5370  


 


Output Total 700  


 


Balance 4670  


 


Weight 225 lb  


 


Intake:   


 


  IV 5250  


 


    Left Wrist 1800  


 


    Right Forearm 3450  


 


  Oral 120  


 


Output:   


 


  Urine 700  


 


    Urethral (Jovel) 700  


 


  Stool 0  


 


  Emesis 0  














- Medications


Active Medications: 


Active Medications











Generic Name Dose Route Start Last Admin





  Trade Name Freq  PRN Reason Stop Dose Admin


 


Albuterol/Ipratropium  3 ml  02/04/19 17:04  





  Duoneb 3 Mg/0.5 Mg (3 Ml) Ud  IH   





  Q2H PRN   





  Shortness of Breath   





     





     





     


 


Aspirin  81 mg  02/05/19 10:00  02/05/19 09:27





  Aspirin Chewable  PO   81 mg





  DAILY [facility]   Administration





     





     





     





     


 


Atorvastatin Calcium  40 mg  02/05/19 17:00  





  Lipitor  PO   





  DIN [facility]   





     





     





     





     


 


Calcium Acetate  2,001 mg  02/05/19 12:00  02/05/19 12:07





  Phoslo  PO   Not Given





  WM [facility]   





     





     





     





     


 


Darbepoetin Balta  100 mcg  02/05/19 10:00  02/05/19 13:50





  Aranesp  IVP   100 mcg





  QWK [facility]   Administration





     





     





     





     


 


Heparin Sodium (Porcine)  5,000 units  02/04/19 22:00  02/05/19 13:44





  Heparin  SC   5,000 units





  Q8 [facility]   Administration





     





     





  Protocol   





     


 


Cefepime HCl  1 gm in 100 mls @ 100 mls/hr  02/04/19 18:45  02/04/19 20:27





  Maxipime 1gm  IVPB   100 mls/hr





  Q24H [facility]   Administration





     





     





  Protocol   





     


 


Sodium Bicarbonate 150 meq/  1,150 mls @ 50 mls/hr  02/05/19 12:30  02/05/19 

13:40





  Dextrose  IV  02/07/19 12:31  50 mls/hr





  .Q23H [facility]   Administration





     





     





     





     


 


Nystatin  0 ea  02/05/19 14:00  02/05/19 13:43





  Mycostatin Cream  TOP   1 applic





  TID [facility]   Administration





     





     





     





     


 


Pantoprazole Sodium  40 mg  02/05/19 10:00  02/05/19 09:02





  Protonix Inj  IVP   40 mg





  DAILY [facility]   Administration





     





     





     





     


 


Tamsulosin HCl  0.4 mg  02/05/19 12:30  





  Flomax  PO   





  DAILY [facility]   





     





     





     





     


 


Vitamin B Complex/Vit C/Folic Acid  1 tab  02/06/19 08:00  





  Nephro-Seda  PO   





  0800 [facility]   





     





     





     





     














- Patient Studies


Lab Studies: 


                              Microbiology Studies











 02/04/19 14:30 Blood Culture - Preliminary





 Blood-Venous    NO GROWTH AFTER 24 HOURS








                                   Lab Studies











  02/05/19 02/05/19 02/05/19 Range/Units





  11:00 10:10 06:20 


 


WBC     (4.5-11.0)  10^3/uL


 


RBC     (3.5-6.1)  10^6/uL


 


Hgb     (14.0-18.0)  g/dL


 


Hct     (42.0-52.0)  %


 


MCV     (80.0-105.0)  fl


 


MCH     (25.0-35.0)  pg


 


MCHC     (31.0-37.0)  g/dl


 


RDW     (11.5-14.5)  %


 


Plt Count     (120.0-450.0)  10^3/uL


 


MPV     (7.0-11.0)  fl


 


Neut % (Auto)     (50.0-68.0)  %


 


Lymph % (Auto)     (22.0-35.0)  %


 


Mono % (Auto)     (1.0-6.0)  %


 


Eos % (Auto)     (1.5-5.0)  %


 


Baso % (Auto)     (0.0-3.0)  %


 


Lymph # (Auto)     (1.2-3.4)  


 


Mono # (Auto)     (0.1-0.6)  


 


Eos # (Auto)     (0.0-0.7)  


 


Baso # (Auto)     (0.0-2.0)  K/mm3


 


Absolute Neuts (auto)     (1.4-6.5)  


 


pCO2    13 L*  (35-45)  mm/Hg


 


pO2    154.0 H  (30-55)  mm/Hg


 


HCO3    5.3 L*  (21-28)  mmol/L


 


ABG pH    7.22 L  (7.35-7.45)  


 


ABG Total CO2    5.7 L  (22-28)  mmol.L


 


ABG O2 Saturation    96.2  (95-98)  %


 


ABG O2 Content    9.0 L  (15-23)  ML/dl


 


ABG Base Excess    -20.5 L  (-2.0-3.0)  mmol/L


 


ABG Hemoglobin    6.4 L  (11.7-17.4)  g/dL


 


ABG Carboxyhemoglobin    0 L  (0.5-1.5)  %


 


POC ABG HHb (Measured)    3.8  (0-5)  %


 


ABG Methemoglobin    0.9  (0.0-3.0)  %


 


ABG O2 Capacity    9.4 L  (16-24)  mL/dl


 


VBG pH     (7.32-7.43)  


 


VBG pCO2     (40-60)  


 


VBG HCO3     (21-28)  mmol/l


 


VBG Total CO2     (22-28)  mmol.L


 


VBG O2 Sat (Calc)     (40-65)  %


 


VBG Base Excess     (0.0-2.0)  mmol/L


 


VBG Potassium     (3.6-5.2)  mmol/L


 


Hgb O2 Saturation    95.4  (95.0-98.0)  %


 


Glucose     ()  mg/dl


 


Lactate     (0.7-2.1)  mmol/L


 


FiO2    28.0  %


 


Crit Value Called To    Vickey moss rn ic[facility]  


 


Crit Value Called By    Tee  


 


Blood Gas Notified Time    633  


 


Sodium     (132-148)  mmol/L


 


Potassium     (3.6-5.0)  mmol/L


 


Chloride     ()  mmol/L


 


Carbon Dioxide     (21-33)  mmol/L


 


Anion Gap     (10-20)  


 


BUN     (7-21)  mg/dL


 


Creatinine     (0.8-1.5)  mg/dl


 


Est GFR (African Amer)     


 


Est GFR (Non-Af Amer)     


 


Random Glucose     ()  mg/dL


 


Calcium     (8.4-10.5)  mg/dL


 


Phosphorus     (2.5-4.5)  mg/dL


 


Magnesium     (1.7-2.2)  mg/dL


 


Iron   137   ()  ug/dL


 


TIBC   146 L   (261-462)  ug/dL


 


% Saturation   93 H   (20-55)  %


 


Total Bilirubin     (0.2-1.3)  mg/dL


 


AST     (17-59)  U/L


 


ALT     (7-56)  U/L


 


Alkaline Phosphatase     ()  U/L


 


CK-MB (CK-2)     (0.0-3.6)  ng/mL


 


CK-MB (CK-2) %     (2.5-3.0)  %


 


Troponin I     ng/mL


 


Total Protein     (5.8-8.3)  g/dL


 


Albumin     (3.0-4.8)  g/dL


 


Globulin     gm/dL


 


Albumin/Globulin Ratio     (1.1-1.8)  


 


Procalcitonin     (0.19-0.49)  NG/ML


 


Venous Blood Potassium     (3.6-5.2)  mmol/L


 


Urine Color     (YELLOW)  


 


Urine Appearance     (CLEAR)  


 


Urine pH     (4.7-8.0)  


 


Ur Specific Gravity     (1.005-1.035)  


 


Urine Protein     (<30 mg/dL)  mg/dL


 


Urine Glucose (UA)     (NEGATIVE)  mg/dL


 


Urine Ketones     (NEGATIVE)  mg/dL


 


Urine Blood     (NEGATIVE)  


 


Urine Nitrate     (NEGATIVE)  


 


Urine Bilirubin     (NEGATIVE)  


 


Urine Urobilinogen     (<1 E.U./dL)  E.U./dL


 


Ur Leukocyte Esterase     (NEGATIVE)  Virgie/uL


 


Urine RBC     (0-2)  /hpf


 


Urine WBC     (0-6)  /hpf


 


Ur Epithelial Cells     (0-5)  /hpf


 


Urine Bacteria     (NONE)  /hpf


 


Ur Random Creatinine  54    mg/dL


 


Ur Random Sodium  96    meq/L














  02/05/19 02/05/19 02/04/19 Range/Units





  05:40 05:40 23:15 


 


WBC  12.3 H D    (4.5-11.0)  10^3/uL


 


RBC  2.76 L    (3.5-6.1)  10^6/uL


 


Hgb  7.7 L    (14.0-18.0)  g/dL


 


Hct  22.2 L    (42.0-52.0)  %


 


MCV  80.4    (80.0-105.0)  fl


 


MCH  27.9    (25.0-35.0)  pg


 


MCHC  34.7    (31.0-37.0)  g/dl


 


RDW  14.3    (11.5-14.5)  %


 


Plt Count  239    (120.0-450.0)  10^3/uL


 


MPV  9.4    (7.0-11.0)  fl


 


Neut % (Auto)  95.8 H    (50.0-68.0)  %


 


Lymph % (Auto)  3.4 L    (22.0-35.0)  %


 


Mono % (Auto)  0.7 L    (1.0-6.0)  %


 


Eos % (Auto)  0.1 L    (1.5-5.0)  %


 


Baso % (Auto)  0.0    (0.0-3.0)  %


 


Lymph # (Auto)  0.4 L    (1.2-3.4)  


 


Mono # (Auto)  0.1    (0.1-0.6)  


 


Eos # (Auto)  0.0    (0.0-0.7)  


 


Baso # (Auto)  0.00    (0.0-2.0)  K/mm3


 


Absolute Neuts (auto)  11.79 H    (1.4-6.5)  


 


pCO2     (35-45)  mm/Hg


 


pO2     (30-55)  mm/Hg


 


HCO3     (21-28)  mmol/L


 


ABG pH     (7.35-7.45)  


 


ABG Total CO2     (22-28)  mmol.L


 


ABG O2 Saturation     (95-98)  %


 


ABG O2 Content     (15-23)  ML/dl


 


ABG Base Excess     (-2.0-3.0)  mmol/L


 


ABG Hemoglobin     (11.7-17.4)  g/dL


 


ABG Carboxyhemoglobin     (0.5-1.5)  %


 


POC ABG HHb (Measured)     (0-5)  %


 


ABG Methemoglobin     (0.0-3.0)  %


 


ABG O2 Capacity     (16-24)  mL/dl


 


VBG pH     (7.32-7.43)  


 


VBG pCO2     (40-60)  


 


VBG HCO3     (21-28)  mmol/l


 


VBG Total CO2     (22-28)  mmol.L


 


VBG O2 Sat (Calc)     (40-65)  %


 


VBG Base Excess     (0.0-2.0)  mmol/L


 


VBG Potassium     (3.6-5.2)  mmol/L


 


Hgb O2 Saturation     (95.0-98.0)  %


 


Glucose     ()  mg/dl


 


Lactate     (0.7-2.1)  mmol/L


 


FiO2     %


 


Crit Value Called To     


 


Crit Value Called By     


 


Blood Gas Notified Time     


 


Sodium   136   (132-148)  mmol/L


 


Potassium   4.7   (3.6-5.0)  mmol/L


 


Chloride   103   ()  mmol/L


 


Carbon Dioxide   7 L D   (21-33)  mmol/L


 


Anion Gap   30 H   (10-20)  


 


BUN   234 H*   (7-21)  mg/dL


 


Creatinine   16.6 H*   (0.8-1.5)  mg/dl


 


Est GFR (African Amer)   4   


 


Est GFR (Non-Af Amer)   3   


 


Random Glucose   155 H   ()  mg/dL


 


Calcium   6.5 L*   (8.4-10.5)  mg/dL


 


Phosphorus   11.9 H   (2.5-4.5)  mg/dL


 


Magnesium   1.7   (1.7-2.2)  mg/dL


 


Iron     ()  ug/dL


 


TIBC     (261-462)  ug/dL


 


% Saturation     (20-55)  %


 


Total Bilirubin   0.8   (0.2-1.3)  mg/dL


 


AST   39   (17-59)  U/L


 


ALT   23   (7-56)  U/L


 


Alkaline Phosphatase   86   ()  U/L


 


CK-MB (CK-2)     (0.0-3.6)  ng/mL


 


CK-MB (CK-2) %     (2.5-3.0)  %


 


Troponin I   0.95 H* D  0.27 H* D  ng/mL


 


Total Protein   6.6   (5.8-8.3)  g/dL


 


Albumin   3.3   (3.0-4.8)  g/dL


 


Globulin   3.4   gm/dL


 


Albumin/Globulin Ratio   1.0 L   (1.1-1.8)  


 


Procalcitonin     (0.19-0.49)  NG/ML


 


Venous Blood Potassium     (3.6-5.2)  mmol/L


 


Urine Color     (YELLOW)  


 


Urine Appearance     (CLEAR)  


 


Urine pH     (4.7-8.0)  


 


Ur Specific Gravity     (1.005-1.035)  


 


Urine Protein     (<30 mg/dL)  mg/dL


 


Urine Glucose (UA)     (NEGATIVE)  mg/dL


 


Urine Ketones     (NEGATIVE)  mg/dL


 


Urine Blood     (NEGATIVE)  


 


Urine Nitrate     (NEGATIVE)  


 


Urine Bilirubin     (NEGATIVE)  


 


Urine Urobilinogen     (<1 E.U./dL)  E.U./dL


 


Ur Leukocyte Esterase     (NEGATIVE)  Virgie/uL


 


Urine RBC     (0-2)  /hpf


 


Urine WBC     (0-6)  /hpf


 


Ur Epithelial Cells     (0-5)  /hpf


 


Urine Bacteria     (NONE)  /hpf


 


Ur Random Creatinine     mg/dL


 


Ur Random Sodium     meq/L














  02/04/19 02/04/19 02/04/19 Range/Units





  22:42 18:14 18:14 


 


WBC   21.9 H   (4.5-11.0)  10^3/uL


 


RBC   3.04 L   (3.5-6.1)  10^6/uL


 


Hgb   8.6 L   (14.0-18.0)  g/dL


 


Hct   25.2 L   (42.0-52.0)  %


 


MCV   82.9   (80.0-105.0)  fl


 


MCH   28.3   (25.0-35.0)  pg


 


MCHC   34.1   (31.0-37.0)  g/dl


 


RDW   14.6 H   (11.5-14.5)  %


 


Plt Count   268   (120.0-450.0)  10^3/uL


 


MPV   9.7   (7.0-11.0)  fl


 


Neut % (Auto)   93.9 H   (50.0-68.0)  %


 


Lymph % (Auto)   4.4 L   (22.0-35.0)  %


 


Mono % (Auto)   1.6   (1.0-6.0)  %


 


Eos % (Auto)   0.0 L   (1.5-5.0)  %


 


Baso % (Auto)   0.1   (0.0-3.0)  %


 


Lymph # (Auto)   1.0 L   (1.2-3.4)  


 


Mono # (Auto)   0.4   (0.1-0.6)  


 


Eos # (Auto)   0.0   (0.0-0.7)  


 


Baso # (Auto)   0.03   (0.0-2.0)  K/mm3


 


Absolute Neuts (auto)   20.56 H   (1.4-6.5)  


 


pCO2     (35-45)  mm/Hg


 


pO2    110 H  (30-55)  mm/Hg


 


HCO3     (21-28)  mmol/L


 


ABG pH     (7.35-7.45)  


 


ABG Total CO2     (22-28)  mmol.L


 


ABG O2 Saturation     (95-98)  %


 


ABG O2 Content     (15-23)  ML/dl


 


ABG Base Excess     (-2.0-3.0)  mmol/L


 


ABG Hemoglobin     (11.7-17.4)  g/dL


 


ABG Carboxyhemoglobin     (0.5-1.5)  %


 


POC ABG HHb (Measured)     (0-5)  %


 


ABG Methemoglobin     (0.0-3.0)  %


 


ABG O2 Capacity     (16-24)  mL/dl


 


VBG pH    6.93 L*  (7.32-7.43)  


 


VBG pCO2    16.0 L*  (40-60)  


 


VBG HCO3    3.4 L  (21-28)  mmol/l


 


VBG Total CO2    3.9 L  (22-28)  mmol.L


 


VBG O2 Sat (Calc)    96.2 H  (40-65)  %


 


VBG Base Excess    -27.7 L  (0.0-2.0)  mmol/L


 


VBG Potassium    5.6 H  (3.6-5.2)  mmol/L


 


Hgb O2 Saturation     (95.0-98.0)  %


 


Glucose    215 H  ()  mg/dl


 


Lactate    1.4  (0.7-2.1)  mmol/L


 


FiO2    21.0  %


 


Crit Value Called To    Karma victoria  


 


Crit Value Called By    [facility]  


 


Blood Gas Notified Time    1825  


 


Sodium    126.0 L  (132-148)  mmol/L


 


Potassium     (3.6-5.0)  mmol/L


 


Chloride    95.0 L  ()  mmol/L


 


Carbon Dioxide     (21-33)  mmol/L


 


Anion Gap     (10-20)  


 


BUN     (7-21)  mg/dL


 


Creatinine     (0.8-1.5)  mg/dl


 


Est GFR (African Amer)     


 


Est GFR (Non-Af Amer)     


 


Random Glucose     ()  mg/dL


 


Calcium     (8.4-10.5)  mg/dL


 


Phosphorus     (2.5-4.5)  mg/dL


 


Magnesium     (1.7-2.2)  mg/dL


 


Iron     ()  ug/dL


 


TIBC     (261-462)  ug/dL


 


% Saturation     (20-55)  %


 


Total Bilirubin     (0.2-1.3)  mg/dL


 


AST     (17-59)  U/L


 


ALT     (7-56)  U/L


 


Alkaline Phosphatase     ()  U/L


 


CK-MB (CK-2)     (0.0-3.6)  ng/mL


 


CK-MB (CK-2) %     (2.5-3.0)  %


 


Troponin I     ng/mL


 


Total Protein     (5.8-8.3)  g/dL


 


Albumin     (3.0-4.8)  g/dL


 


Globulin     gm/dL


 


Albumin/Globulin Ratio     (1.1-1.8)  


 


Procalcitonin     (0.19-0.49)  NG/ML


 


Venous Blood Potassium    5.6 H  (3.6-5.2)  mmol/L


 


Urine Color  Light yellow    (YELLOW)  


 


Urine Appearance  Sl cloudy    (CLEAR)  


 


Urine pH  6.0    (4.7-8.0)  


 


Ur Specific Gravity  1.020    (1.005-1.035)  


 


Urine Protein  Trace H    (<30 mg/dL)  mg/dL


 


Urine Glucose (UA)  Negative    (NEGATIVE)  mg/dL


 


Urine Ketones  Negative    (NEGATIVE)  mg/dL


 


Urine Blood  Moderate H    (NEGATIVE)  


 


Urine Nitrate  Negative    (NEGATIVE)  


 


Urine Bilirubin  Negative    (NEGATIVE)  


 


Urine Urobilinogen  0.2    (<1 E.U./dL)  E.U./dL


 


Ur Leukocyte Esterase  Moderate H    (NEGATIVE)  Virgie/uL


 


Urine RBC  2 - 5 H    (0-2)  /hpf


 


Urine WBC  25 - 30 H    (0-6)  /hpf


 


Ur Epithelial Cells  0 - 2    (0-5)  /hpf


 


Urine Bacteria  Few    (NONE)  /hpf


 


Ur Random Creatinine     mg/dL


 


Ur Random Sodium     meq/L














  02/04/19 02/04/19 02/04/19 Range/Units





  18:00 17:56 14:50 


 


WBC     (4.5-11.0)  10^3/uL


 


RBC     (3.5-6.1)  10^6/uL


 


Hgb     (14.0-18.0)  g/dL


 


Hct     (42.0-52.0)  %


 


MCV     (80.0-105.0)  fl


 


MCH     (25.0-35.0)  pg


 


MCHC     (31.0-37.0)  g/dl


 


RDW     (11.5-14.5)  %


 


Plt Count     (120.0-450.0)  10^3/uL


 


MPV     (7.0-11.0)  fl


 


Neut % (Auto)     (50.0-68.0)  %


 


Lymph % (Auto)     (22.0-35.0)  %


 


Mono % (Auto)     (1.0-6.0)  %


 


Eos % (Auto)     (1.5-5.0)  %


 


Baso % (Auto)     (0.0-3.0)  %


 


Lymph # (Auto)     (1.2-3.4)  


 


Mono # (Auto)     (0.1-0.6)  


 


Eos # (Auto)     (0.0-0.7)  


 


Baso # (Auto)     (0.0-2.0)  K/mm3


 


Absolute Neuts (auto)     (1.4-6.5)  


 


pCO2     (35-45)  mm/Hg


 


pO2     (30-55)  mm/Hg


 


HCO3     (21-28)  mmol/L


 


ABG pH     (7.35-7.45)  


 


ABG Total CO2     (22-28)  mmol.L


 


ABG O2 Saturation     (95-98)  %


 


ABG O2 Content     (15-23)  ML/dl


 


ABG Base Excess     (-2.0-3.0)  mmol/L


 


ABG Hemoglobin     (11.7-17.4)  g/dL


 


ABG Carboxyhemoglobin     (0.5-1.5)  %


 


POC ABG HHb (Measured)     (0-5)  %


 


ABG Methemoglobin     (0.0-3.0)  %


 


ABG O2 Capacity     (16-24)  mL/dl


 


VBG pH     (7.32-7.43)  


 


VBG pCO2     (40-60)  


 


VBG HCO3     (21-28)  mmol/l


 


VBG Total CO2     (22-28)  mmol.L


 


VBG O2 Sat (Calc)     (40-65)  %


 


VBG Base Excess     (0.0-2.0)  mmol/L


 


VBG Potassium     (3.6-5.2)  mmol/L


 


Hgb O2 Saturation     (95.0-98.0)  %


 


Glucose     ()  mg/dl


 


Lactate     (0.7-2.1)  mmol/L


 


FiO2     %


 


Crit Value Called To     


 


Crit Value Called By     


 


Blood Gas Notified Time     


 


Sodium   131 L   (132-148)  mmol/L


 


Potassium   5.4 H   (3.6-5.0)  mmol/L


 


Chloride   97 L   ()  mmol/L


 


Carbon Dioxide   < 5 L   (21-33)  mmol/L


 


Anion Gap   34 H   (10-20)  


 


BUN   230 H*   (7-21)  mg/dL


 


Creatinine   18.3 H*   (0.8-1.5)  mg/dl


 


Est GFR ( Amer)   3   


 


Est GFR (Non-Af Amer)   3   


 


Random Glucose   211 H   ()  mg/dL


 


Calcium   6.7 L*   (8.4-10.5)  mg/dL


 


Phosphorus   13.4 H   (2.5-4.5)  mg/dL


 


Magnesium   2.0   (1.7-2.2)  mg/dL


 


Iron     ()  ug/dL


 


TIBC     (261-462)  ug/dL


 


% Saturation     (20-55)  %


 


Total Bilirubin   0.9   (0.2-1.3)  mg/dL


 


AST   29   (17-59)  U/L


 


ALT   26   (7-56)  U/L


 


Alkaline Phosphatase   105   ()  U/L


 


CK-MB (CK-2)    25.0 H  (0.0-3.6)  ng/mL


 


CK-MB (CK-2) %    5.2 H  (2.5-3.0)  %


 


Troponin I   0.11   ng/mL


 


Total Protein   7.3   (5.8-8.3)  g/dL


 


Albumin   3.6   (3.0-4.8)  g/dL


 


Globulin   3.7   gm/dL


 


Albumin/Globulin Ratio   1.0 L   (1.1-1.8)  


 


Procalcitonin  1.89 H    (0.19-0.49)  NG/ML


 


Venous Blood Potassium     (3.6-5.2)  mmol/L


 


Urine Color     (YELLOW)  


 


Urine Appearance     (CLEAR)  


 


Urine pH     (4.7-8.0)  


 


Ur Specific Gravity     (1.005-1.035)  


 


Urine Protein     (<30 mg/dL)  mg/dL


 


Urine Glucose (UA)     (NEGATIVE)  mg/dL


 


Urine Ketones     (NEGATIVE)  mg/dL


 


Urine Blood     (NEGATIVE)  


 


Urine Nitrate     (NEGATIVE)  


 


Urine Bilirubin     (NEGATIVE)  


 


Urine Urobilinogen     (<1 E.U./dL)  E.U./dL


 


Ur Leukocyte Esterase     (NEGATIVE)  Virgie/uL


 


Urine RBC     (0-2)  /hpf


 


Urine WBC     (0-6)  /hpf


 


Ur Epithelial Cells     (0-5)  /hpf


 


Urine Bacteria     (NONE)  /hpf


 


Ur Random Creatinine     mg/dL


 


Ur Random Sodium     meq/L








                         Laboratory Results - last 24 hr











  02/04/19 02/04/19 02/04/19





  14:50 17:56 18:00


 


WBC   


 


RBC   


 


Hgb   


 


Hct   


 


MCV   


 


MCH   


 


MCHC   


 


RDW   


 


Plt Count   


 


MPV   


 


Neut % (Auto)   


 


Lymph % (Auto)   


 


Mono % (Auto)   


 


Eos % (Auto)   


 


Baso % (Auto)   


 


Lymph # (Auto)   


 


Mono # (Auto)   


 


Eos # (Auto)   


 


Baso # (Auto)   


 


Absolute Neuts (auto)   


 


pCO2   


 


pO2   


 


HCO3   


 


ABG pH   


 


ABG Total CO2   


 


ABG O2 Saturation   


 


ABG O2 Content   


 


ABG Base Excess   


 


ABG Hemoglobin   


 


ABG Carboxyhemoglobin   


 


POC ABG HHb (Measured)   


 


ABG Methemoglobin   


 


ABG O2 Capacity   


 


VBG pH   


 


VBG pCO2   


 


VBG HCO3   


 


VBG Total CO2   


 


VBG O2 Sat (Calc)   


 


VBG Base Excess   


 


VBG Potassium   


 


Hgb O2 Saturation   


 


Glucose   


 


Lactate   


 


FiO2   


 


Crit Value Called To   


 


Crit Value Called By   


 


Blood Gas Notified Time   


 


Sodium   131 L 


 


Potassium   5.4 H 


 


Chloride   97 L 


 


Carbon Dioxide   < 5 L 


 


Anion Gap   34 H 


 


BUN   230 H* 


 


Creatinine   18.3 H* 


 


Est GFR ( Amer)   3 


 


Est GFR (Non-Af Amer)   3 


 


Random Glucose   211 H 


 


Calcium   6.7 L* 


 


Phosphorus   13.4 H 


 


Magnesium   2.0 


 


Iron   


 


TIBC   


 


% Saturation   


 


Total Bilirubin   0.9 


 


AST   29 


 


ALT   26 


 


Alkaline Phosphatase   105 


 


CK-MB (CK-2)  25.0 H  


 


CK-MB (CK-2) %  5.2 H  


 


Troponin I   0.11 


 


Total Protein   7.3 


 


Albumin   3.6 


 


Globulin   3.7 


 


Albumin/Globulin Ratio   1.0 L 


 


Procalcitonin    1.89 H


 


Venous Blood Potassium   


 


Urine Color   


 


Urine Appearance   


 


Urine pH   


 


Ur Specific Gravity   


 


Urine Protein   


 


Urine Glucose (UA)   


 


Urine Ketones   


 


Urine Blood   


 


Urine Nitrate   


 


Urine Bilirubin   


 


Urine Urobilinogen   


 


Ur Leukocyte Esterase   


 


Urine RBC   


 


Urine WBC   


 


Ur Epithelial Cells   


 


Urine Bacteria   


 


Ur Random Creatinine   


 


Ur Random Sodium   














  02/04/19 02/04/19 02/04/19





  18:14 18:14 22:42


 


WBC   21.9 H 


 


RBC   3.04 L 


 


Hgb   8.6 L 


 


Hct   25.2 L 


 


MCV   82.9 


 


MCH   28.3 


 


MCHC   34.1 


 


RDW   14.6 H 


 


Plt Count   268 


 


MPV   9.7 


 


Neut % (Auto)   93.9 H 


 


Lymph % (Auto)   4.4 L 


 


Mono % (Auto)   1.6 


 


Eos % (Auto)   0.0 L 


 


Baso % (Auto)   0.1 


 


Lymph # (Auto)   1.0 L 


 


Mono # (Auto)   0.4 


 


Eos # (Auto)   0.0 


 


Baso # (Auto)   0.03 


 


Absolute Neuts (auto)   20.56 H 


 


pCO2   


 


pO2  110 H  


 


HCO3   


 


ABG pH   


 


ABG Total CO2   


 


ABG O2 Saturation   


 


ABG O2 Content   


 


ABG Base Excess   


 


ABG Hemoglobin   


 


ABG Carboxyhemoglobin   


 


POC ABG HHb (Measured)   


 


ABG Methemoglobin   


 


ABG O2 Capacity   


 


VBG pH  6.93 L*  


 


VBG pCO2  16.0 L*  


 


VBG HCO3  3.4 L  


 


VBG Total CO2  3.9 L  


 


VBG O2 Sat (Calc)  96.2 H  


 


VBG Base Excess  -27.7 L  


 


VBG Potassium  5.6 H  


 


Hgb O2 Saturation   


 


Glucose  215 H  


 


Lactate  1.4  


 


FiO2  21.0  


 


Crit Value Called To  Karma victoria  


 


Crit Value Called By  Atc  


 


Blood Gas Notified Time  1825  


 


Sodium  126.0 L  


 


Potassium   


 


Chloride  95.0 L  


 


Carbon Dioxide   


 


Anion Gap   


 


BUN   


 


Creatinine   


 


Est GFR ( Amer)   


 


Est GFR (Non-Af Amer)   


 


Random Glucose   


 


Calcium   


 


Phosphorus   


 


Magnesium   


 


Iron   


 


TIBC   


 


% Saturation   


 


Total Bilirubin   


 


AST   


 


ALT   


 


Alkaline Phosphatase   


 


CK-MB (CK-2)   


 


CK-MB (CK-2) %   


 


Troponin I   


 


Total Protein   


 


Albumin   


 


Globulin   


 


Albumin/Globulin Ratio   


 


Procalcitonin   


 


Venous Blood Potassium  5.6 H  


 


Urine Color    Light yellow


 


Urine Appearance    Sl cloudy


 


Urine pH    6.0


 


Ur Specific Gravity    1.020


 


Urine Protein    Trace H


 


Urine Glucose (UA)    Negative


 


Urine Ketones    Negative


 


Urine Blood    Moderate H


 


Urine Nitrate    Negative


 


Urine Bilirubin    Negative


 


Urine Urobilinogen    0.2


 


Ur Leukocyte Esterase    Moderate H


 


Urine RBC    2 - 5 H


 


Urine WBC    25 - 30 H


 


Ur Epithelial Cells    0 - 2


 


Urine Bacteria    Few


 


Ur Random Creatinine   


 


Ur Random Sodium   














  02/04/19 02/05/19 02/05/19





  23:15 05:40 05:40


 


WBC    12.3 H D


 


RBC    2.76 L


 


Hgb    7.7 L


 


Hct    22.2 L


 


MCV    80.4


 


MCH    27.9


 


MCHC    34.7


 


RDW    14.3


 


Plt Count    239


 


MPV    9.4


 


Neut % (Auto)    95.8 H


 


Lymph % (Auto)    3.4 L


 


Mono % (Auto)    0.7 L


 


Eos % (Auto)    0.1 L


 


Baso % (Auto)    0.0


 


Lymph # (Auto)    0.4 L


 


Mono # (Auto)    0.1


 


Eos # (Auto)    0.0


 


Baso # (Auto)    0.00


 


Absolute Neuts (auto)    11.79 H


 


pCO2   


 


pO2   


 


HCO3   


 


ABG pH   


 


ABG Total CO2   


 


ABG O2 Saturation   


 


ABG O2 Content   


 


ABG Base Excess   


 


ABG Hemoglobin   


 


ABG Carboxyhemoglobin   


 


POC ABG HHb (Measured)   


 


ABG Methemoglobin   


 


ABG O2 Capacity   


 


VBG pH   


 


VBG pCO2   


 


VBG HCO3   


 


VBG Total CO2   


 


VBG O2 Sat (Calc)   


 


VBG Base Excess   


 


VBG Potassium   


 


Hgb O2 Saturation   


 


Glucose   


 


Lactate   


 


FiO2   


 


Crit Value Called To   


 


Crit Value Called By   


 


Blood Gas Notified Time   


 


Sodium   136 


 


Potassium   4.7 


 


Chloride   103 


 


Carbon Dioxide   7 L D 


 


Anion Gap   30 H 


 


BUN   234 H* 


 


Creatinine   16.6 H* 


 


Est GFR ( Amer)   4 


 


Est GFR (Non-Af Amer)   3 


 


Random Glucose   155 H 


 


Calcium   6.5 L* 


 


Phosphorus   11.9 H 


 


Magnesium   1.7 


 


Iron   


 


TIBC   


 


% Saturation   


 


Total Bilirubin   0.8 


 


AST   39 


 


ALT   23 


 


Alkaline Phosphatase   86 


 


CK-MB (CK-2)   


 


CK-MB (CK-2) %   


 


Troponin I  0.27 H* D  0.95 H* D 


 


Total Protein   6.6 


 


Albumin   3.3 


 


Globulin   3.4 


 


Albumin/Globulin Ratio   1.0 L 


 


Procalcitonin   


 


Venous Blood Potassium   


 


Urine Color   


 


Urine Appearance   


 


Urine pH   


 


Ur Specific Gravity   


 


Urine Protein   


 


Urine Glucose (UA)   


 


Urine Ketones   


 


Urine Blood   


 


Urine Nitrate   


 


Urine Bilirubin   


 


Urine Urobilinogen   


 


Ur Leukocyte Esterase   


 


Urine RBC   


 


Urine WBC   


 


Ur Epithelial Cells   


 


Urine Bacteria   


 


Ur Random Creatinine   


 


Ur Random Sodium   














  02/05/19 02/05/19 02/05/19





  06:20 10:10 11:00


 


WBC   


 


RBC   


 


Hgb   


 


Hct   


 


MCV   


 


MCH   


 


MCHC   


 


RDW   


 


Plt Count   


 


MPV   


 


Neut % (Auto)   


 


Lymph % (Auto)   


 


Mono % (Auto)   


 


Eos % (Auto)   


 


Baso % (Auto)   


 


Lymph # (Auto)   


 


Mono # (Auto)   


 


Eos # (Auto)   


 


Baso # (Auto)   


 


Absolute Neuts (auto)   


 


pCO2  13 L*  


 


pO2  154.0 H  


 


HCO3  5.3 L*  


 


ABG pH  7.22 L  


 


ABG Total CO2  5.7 L  


 


ABG O2 Saturation  96.2  


 


ABG O2 Content  9.0 L  


 


ABG Base Excess  -20.5 L  


 


ABG Hemoglobin  6.4 L  


 


ABG Carboxyhemoglobin  0 L  


 


POC ABG HHb (Measured)  3.8  


 


ABG Methemoglobin  0.9  


 


ABG O2 Capacity  9.4 L  


 


VBG pH   


 


VBG pCO2   


 


VBG HCO3   


 


VBG Total CO2   


 


VBG O2 Sat (Calc)   


 


VBG Base Excess   


 


VBG Potassium   


 


Hgb O2 Saturation  95.4  


 


Glucose   


 


Lactate   


 


FiO2  28.0  


 


Crit Value Called To  [facility]  


 


Crit Value Called By  Tee  


 


Blood Gas Notified Time  633  


 


Sodium   


 


Potassium   


 


Chloride   


 


Carbon Dioxide   


 


Anion Gap   


 


BUN   


 


Creatinine   


 


Est GFR ( Amer)   


 


Est GFR (Non-Af Amer)   


 


Random Glucose   


 


Calcium   


 


Phosphorus   


 


Magnesium   


 


Iron   137 


 


TIBC   146 L 


 


% Saturation   93 H 


 


Total Bilirubin   


 


AST   


 


ALT   


 


Alkaline Phosphatase   


 


CK-MB (CK-2)   


 


CK-MB (CK-2) %   


 


Troponin I   


 


Total Protein   


 


Albumin   


 


Globulin   


 


Albumin/Globulin Ratio   


 


Procalcitonin   


 


Venous Blood Potassium   


 


Urine Color   


 


Urine Appearance   


 


Urine pH   


 


Ur Specific Gravity   


 


Urine Protein   


 


Urine Glucose (UA)   


 


Urine Ketones   


 


Urine Blood   


 


Urine Nitrate   


 


Urine Bilirubin   


 


Urine Urobilinogen   


 


Ur Leukocyte Esterase   


 


Urine RBC   


 


Urine WBC   


 


Ur Epithelial Cells   


 


Urine Bacteria   


 


Ur Random Creatinine    54


 


Ur Random Sodium    96











Radiology Impressions: 


                              Radiology Impressions





Chest X-Ray  02/04/19 14:13


IMPRESSION:


No active disease. 


 


 








Renal Ultrasound  02/05/19 09:28


IMPRESSION:


11 mm nonobstructing calculus lower pole left kidney.  Otherwise 


unremarkable.


 


 








Abdomen/Pelvis CT  02/05/19 10:24


IMPRESSION:


Obstructing 9 mm mid left ureteral calculus with mild left 


hydronephrosis 8 mm nonobstructing left lower pole renal calculus. 


Minor findings as above. Small bilateral pleural effusion, right 


greater than left. 


 


 








Chest X-Ray  02/05/19 12:34


IMPRESSION:


New right tunneled central venous dialysis catheter otherwise no 


significant change.  


 


 














Critical Care Progress Note





- Nutrition


Nutrition: 


                                    Nutrition











 Category Date Time Status


 


 NPO Diet [DIET] Diets  02/04/19 Dinner Ordered














Attending/Attestation





- Attestation


I have personally seen and examined this patient.: Yes


I have fully participated in the care of the patient.: Yes


I have reviewed all pertinent clinical information: Yes


Notes (Text): 





02/05/19 15:48


please see Dr. Allen note

## 2019-02-05 NOTE — CT
Date of service: 



02/05/2019



PROCEDURE:  CT Abdomen and Pelvis without intravenous contrast



HISTORY:

ROXANE unclear etiology



COMPARISON:

Not available



TECHNIQUE:

Without contrast.. 



Contrast dose: 0



Radiation dose:



Total exam DLP = 1024.36 mGy-cm.



This CT exam was performed using one or more of the following dose 

reduction techniques: Automated exposure control, adjustment of the 

mA and/or kV according to patient size, and/or use of iterative 

reconstruction technique.



FINDINGS:



LOWER THORAX:

Small right pleural effusion. Trace left pleural effusion. No 

infiltrate 



LIVER:

Unremarkable. No gross lesion or ductal dilatation.  



GALLBLADDER AND BILE DUCTS:

Unremarkable. 



PANCREAS:

Unremarkable. No gross lesion or ductal dilatation.



SPLEEN:

Unremarkable. 



ADRENALS:

Unremarkable. No mass. 



KIDNEYS AND URETERS:

Mild left hydronephrosis. 8 mm nonobstructing left lower pole renal 

calculus. 10 mm obstructing mid left ureteral calculus as measured 

from the coronal images. Evaluation of distal ureter limited due to 

extensive beam hardening artifact arising from bilateral hip 

prostheses. No right hydronephrosis. No renal mass. 



VASCULATURE:

Unremarkable. No aortic aneurysm. Minimal atherosclerotic 

calcification of the abdominal aorta.



BOWEL:

Unremarkable. No obstruction. No gross mural thickening. 



APPENDIX:

Normal 



PERITONEUM:

Unremarkable. No free fluid. No free air. 



LYMPH NODES:

Unremarkable. No enlarged lymph nodes. 



BLADDER:

Nondistended. Reyes catheter balloon. Limited evaluation due to beam 

hardening artifact from hip prostheses. 



REPRODUCTIVE:

Unable to evaluate prostate. 



BONES:

No acute fracture. 



OTHER FINDINGS:

None.



IMPRESSION:

Obstructing 9 mm mid left ureteral calculus with mild left 

hydronephrosis 8 mm nonobstructing left lower pole renal calculus. 

Minor findings as above. Small bilateral pleural effusion, right 

greater than left.

## 2019-02-05 NOTE — CP.PCM.CON
History of Present Illness





- History of Present Illness


History of Present Illness: 


68 year old male with PMH of arthritis, chronic right leg rash [place] [facility] [place] 

because of worsening shortness of breath for the past week, associated with 

generalized weakness. The patient has also been having dyspnea on exertion. He 

denies fever or chills, no cough, no rhinorrhea, no sore throat, no abdominal 

pain, no diarrhea, no dysuria, denies headache or dizziness, no back pain 

currently. The patient also had an episode of chest pain about 3 days ago and an

episode of nausea or vomiting. In the ED, he was noted to have renal failrue and

leukocytosis and Infectious Diseases consult is requested to further evaluate 

and manage.





PMH: as above


Past surg hx: S/P hip surgery


FMH: mother ESRD on HD


PSHx: more than 30 pack year smoking history, no alcohol abuse or illicit drug 

use





Review of Systems





- Review of Systems


All systems: reviewed and no additional remarkable complaints except (as per 

HPI)





Past Patient History





- Past Social History


Smoking Status: Former Smoker





- CARDIAC


Hx Cardiac Disorders: No





- PULMONARY


Hx Respiratory Disorders: Yes (SMOKED CIGARETTES 1 PPD QUIT 2012.)





- NEUROLOGICAL


Hx Neurological Disorder: No





- HEENT


Hx HEENT Problems: No





- RENAL


Hx Chronic Kidney Disease: Yes


Hx Renal Failure: Yes (5-2-19)





- ENDOCRINE/METABOLIC


Hx Endocrine Disorders: No





- HEMATOLOGICAL/ONCOLOGICAL


Hx Blood Disorders: No





- INTEGUMENTARY


Hx Dermatological Problems: Yes


Hx Psoriasis: Yes (BILATERAL LE)





- MUSCULOSKELETAL/RHEUMATOLOGICAL


Hx Musculoskeletal Disorders: Yes


Hx Arthritis: Yes


Hx Falls: Yes





- GASTROINTESTINAL


Hx Gastrointestinal Disorders: No





- GENITOURINARY/GYNECOLOGICAL


Hx Genitourinary Disorders: No





- PSYCHIATRIC


Hx Psychophysiologic Disorder: No


Hx Substance Use: No





- SURGICAL HISTORY


Hx Surgeries: Yes (BILATERAL HIP SX 2016)





- ANESTHESIA


Hx Anesthesia: No


Hx Anesthesia Reactions: No


Hx Malignant Hyperthermia: No





Meds


Allergies/Adverse Reactions: 


                                    Allergies











Allergy/AdvReac Type Severity Reaction Status Date / Time


 


No Known Allergies Allergy   Verified 02/04/19 19:36














- Medications


Medications: 


                               Current Medications





Albuterol/Ipratropium (Duoneb 3 Mg/0.5 Mg (3 Ml) Ud)  3 ml IH Q2H PRN


   PRN Reason: Shortness of Breath


Heparin Sodium (Porcine) (Heparin)  5,000 units SC Q8 [facility]; Protocol


   Last Admin: 02/05/19 05:02 Dose:  5,000 units


Sodium Bicarbonate 150 meq/ (Sodium Chloride)  1,150 mls @ 150 mls/hr IV .Q7H40M

BRENT


   Last Admin: 02/05/19 01:40 Dose:  150 mls/hr


Cefepime HCl (Maxipime 1gm)  1 gm in 100 mls @ 100 mls/hr IVPB Q24H [facility]; 

Protocol


   Last Admin: 02/04/19 20:27 Dose:  100 mls/hr


Pantoprazole Sodium (Protonix Inj)  40 mg IVP DAILY [facility]











Physical Exam





- Constitutional


Appears: Chronically Ill





- Head Exam


Head Exam: NORMAL INSPECTION





- Neck Exam


Neck exam: Negative for: Lymphadenopathy, Meningismus





- Respiratory Exam


Respiratory Exam: Decreased Breath Sounds





- Cardiovascular Exam


Cardiovascular Exam: +S1, +S2





- GI/Abdominal Exam


GI & Abdominal Exam: Soft.  absent: Tenderness





- Extremities Exam


Additional comments: 





right leg anterior area with lichenification





Results





- Vital Signs


Recent Vital Signs: 


                                Last Vital Signs











Temp  96.6 F L  02/04/19 23:16


 


Pulse  108 H  02/05/19 06:00


 


Resp  23   02/04/19 22:12


 


BP  129/61   02/04/19 17:58


 


Pulse Ox  98   02/04/19 17:58














- Labs


Result Diagrams: 


                                 02/05/19 05:40





                                 02/05/19 05:40


Labs: 


                         Laboratory Results - last 24 hr











  02/04/19 02/04/19 02/04/19





  14:50 14:50 14:50


 


WBC  23.3 H  


 


RBC  3.16 L  


 


Hgb  9.0 L  


 


Hct  26.2 L  


 


MCV  82.9  


 


MCH  28.5  


 


MCHC  34.4  


 


RDW  14.5  


 


Plt Count  264  


 


MPV  9.5  


 


Neut % (Auto)  91.4 H  


 


Lymph % (Auto)  6.7 L  


 


Mono % (Auto)  1.5  


 


Eos % (Auto)  0.2 L  


 


Baso % (Auto)  0.2  


 


Lymph # (Auto)  1.6  


 


Mono # (Auto)  0.4  


 


Eos # (Auto)  0.0  


 


Baso # (Auto)  0.05  


 


Absolute Neuts (auto)  21.29 H  


 


Neutrophils % (Manual)  85 H  


 


Band Neutrophils %  4 H  


 


Lymphocytes % (Manual)  4 L  


 


Monocytes % (Manual)  6  


 


Metamyelocytes %  1  


 


Platelet Evaluation  Normal  


 


PT   14.3 H 


 


INR   1.29 


 


APTT   38.1 


 


D-Dimer, Quantitative   5760 H 


 


pCO2   


 


pO2    215 H


 


HCO3   


 


ABG pH   


 


ABG Total CO2   


 


ABG O2 Saturation   


 


ABG O2 Content   


 


ABG Base Excess   


 


ABG Hemoglobin   


 


ABG Carboxyhemoglobin   


 


POC ABG HHb (Measured)   


 


ABG Methemoglobin   


 


ABG O2 Capacity   


 


VBG pH    6.94 L*


 


VBG pCO2    17.0 L*


 


VBG HCO3    3.7 L


 


VBG Total CO2    4.2 L


 


VBG O2 Sat (Calc)    96.8 H


 


VBG Base Excess    -27.2 L


 


VBG Potassium    5.6 H


 


Hgb O2 Saturation   


 


Sodium    125.0 L


 


Chloride    94.0 L


 


Glucose    140 H


 


Lactate    1.2


 


FiO2    21.0


 


Blood Gas Comments   


 


Crit Value Called To    Kasey ramírez


 


Crit Value Called By    Atc


 


Blood Gas Notified Time    1505


 


Potassium   


 


Carbon Dioxide   


 


Anion Gap   


 


BUN   


 


Creatinine   


 


Est GFR ( Amer)   


 


Est GFR (Non-Af Amer)   


 


Random Glucose   


 


Calcium   


 


Phosphorus   


 


Magnesium   


 


Total Bilirubin   


 


AST   


 


ALT   


 


Alkaline Phosphatase   


 


Total Creatine Kinase   


 


CK-MB (CK-2)   


 


CK-MB (CK-2) %   


 


Troponin I   


 


NT-Pro-B Natriuret Pep   


 


Total Protein   


 


Albumin   


 


Globulin   


 


Albumin/Globulin Ratio   


 


Procalcitonin   


 


Venous Blood Potassium    5.6 H


 


Urine Color   


 


Urine Appearance   


 


Urine pH   


 


Ur Specific Gravity   


 


Urine Protein   


 


Urine Glucose (UA)   


 


Urine Ketones   


 


Urine Blood   


 


Urine Nitrate   


 


Urine Bilirubin   


 


Urine Urobilinogen   


 


Ur Leukocyte Esterase   


 


Urine RBC   


 


Urine WBC   


 


Ur Epithelial Cells   


 


Urine Bacteria   














  02/04/19 02/04/19 02/04/19





  14:50 15:15 17:56


 


WBC   


 


RBC   


 


Hgb   


 


Hct   


 


MCV   


 


MCH   


 


MCHC   


 


RDW   


 


Plt Count   


 


MPV   


 


Neut % (Auto)   


 


Lymph % (Auto)   


 


Mono % (Auto)   


 


Eos % (Auto)   


 


Baso % (Auto)   


 


Lymph # (Auto)   


 


Mono # (Auto)   


 


Eos # (Auto)   


 


Baso # (Auto)   


 


Absolute Neuts (auto)   


 


Neutrophils % (Manual)   


 


Band Neutrophils %   


 


Lymphocytes % (Manual)   


 


Monocytes % (Manual)   


 


Metamyelocytes %   


 


Platelet Evaluation   


 


PT   


 


INR   


 


APTT   


 


D-Dimer, Quantitative   


 


pCO2   13 L* 


 


pO2   142.0 H 


 


HCO3   3.1 L* 


 


ABG pH   6.98 L* 


 


ABG Total CO2   3.5 L 


 


ABG O2 Saturation   96.7 


 


ABG O2 Content   12.0 L 


 


ABG Base Excess   -26.5 L 


 


ABG Hemoglobin   8.7 L 


 


ABG Carboxyhemoglobin   0 L 


 


POC ABG HHb (Measured)   3.3 


 


ABG Methemoglobin   1.0 


 


ABG O2 Capacity   12.4 L 


 


VBG pH   


 


VBG pCO2   


 


VBG HCO3   


 


VBG Total CO2   


 


VBG O2 Sat (Calc)   


 


VBG Base Excess   


 


VBG Potassium   


 


Hgb O2 Saturation   95.6 


 


Sodium  131 L   131 L


 


Chloride  96 L   97 L


 


Glucose   


 


Lactate   


 


FiO2   28.0 


 


Blood Gas Comments   Walked to er md 


 


Crit Value Called To   harish Nunn 


 


Crit Value Called By   Ec 


 


Blood Gas Notified Time   1522 


 


Potassium  5.7 H*   5.4 H


 


Carbon Dioxide  5 L   < 5 L


 


Anion Gap  36 H   34 H


 


BUN  232 H*   230 H*


 


Creatinine  18.1 H*   18.3 H*


 


Est GFR ( Amer)  3   3


 


Est GFR (Non-Af Amer)  3   3


 


Random Glucose  136 H   211 H


 


Calcium  7.0 L   6.7 L*


 


Phosphorus    13.4 H


 


Magnesium  1.9   2.0


 


Total Bilirubin  0.8   0.9


 


AST  34   29


 


ALT  23   26


 


Alkaline Phosphatase  109   105


 


Total Creatine Kinase  477 H  


 


CK-MB (CK-2)  25.0 H  


 


CK-MB (CK-2) %  5.2 H  


 


Troponin I    0.11


 


NT-Pro-B Natriuret Pep  05472 H  


 


Total Protein  7.7   7.3


 


Albumin  3.8   3.6


 


Globulin  4.0   3.7


 


Albumin/Globulin Ratio  1.0 L   1.0 L


 


Procalcitonin   


 


Venous Blood Potassium   


 


Urine Color   


 


Urine Appearance   


 


Urine pH   


 


Ur Specific Gravity   


 


Urine Protein   


 


Urine Glucose (UA)   


 


Urine Ketones   


 


Urine Blood   


 


Urine Nitrate   


 


Urine Bilirubin   


 


Urine Urobilinogen   


 


Ur Leukocyte Esterase   


 


Urine RBC   


 


Urine WBC   


 


Ur Epithelial Cells   


 


Urine Bacteria   














  02/04/19 02/04/19 02/04/19





  18:00 18:14 18:14


 


WBC    21.9 H


 


RBC    3.04 L


 


Hgb    8.6 L


 


Hct    25.2 L


 


MCV    82.9


 


MCH    28.3


 


MCHC    34.1


 


RDW    14.6 H


 


Plt Count    268


 


MPV    9.7


 


Neut % (Auto)    93.9 H


 


Lymph % (Auto)    4.4 L


 


Mono % (Auto)    1.6


 


Eos % (Auto)    0.0 L


 


Baso % (Auto)    0.1


 


Lymph # (Auto)    1.0 L


 


Mono # (Auto)    0.4


 


Eos # (Auto)    0.0


 


Baso # (Auto)    0.03


 


Absolute Neuts (auto)    20.56 H


 


Neutrophils % (Manual)   


 


Band Neutrophils %   


 


Lymphocytes % (Manual)   


 


Monocytes % (Manual)   


 


Metamyelocytes %   


 


Platelet Evaluation   


 


PT   


 


INR   


 


APTT   


 


D-Dimer, Quantitative   


 


pCO2   


 


pO2   110 H 


 


HCO3   


 


ABG pH   


 


ABG Total CO2   


 


ABG O2 Saturation   


 


ABG O2 Content   


 


ABG Base Excess   


 


ABG Hemoglobin   


 


ABG Carboxyhemoglobin   


 


POC ABG HHb (Measured)   


 


ABG Methemoglobin   


 


ABG O2 Capacity   


 


VBG pH   6.93 L* 


 


VBG pCO2   16.0 L* 


 


VBG HCO3   3.4 L 


 


VBG Total CO2   3.9 L 


 


VBG O2 Sat (Calc)   96.2 H 


 


VBG Base Excess   -27.7 L 


 


VBG Potassium   5.6 H 


 


Hgb O2 Saturation   


 


Sodium   126.0 L 


 


Chloride   95.0 L 


 


Glucose   215 H 


 


Lactate   1.4 


 


FiO2   21.0 


 


Blood Gas Comments   


 


Crit Value Called To   Karma victoria 


 


Crit Value Called By   [facility] 


 


Blood Gas Notified Time   1825 


 


Potassium   


 


Carbon Dioxide   


 


Anion Gap   


 


BUN   


 


Creatinine   


 


Est GFR ( Amer)   


 


Est GFR (Non-Af Amer)   


 


Random Glucose   


 


Calcium   


 


Phosphorus   


 


Magnesium   


 


Total Bilirubin   


 


AST   


 


ALT   


 


Alkaline Phosphatase   


 


Total Creatine Kinase   


 


CK-MB (CK-2)   


 


CK-MB (CK-2) %   


 


Troponin I   


 


NT-Pro-B Natriuret Pep   


 


Total Protein   


 


Albumin   


 


Globulin   


 


Albumin/Globulin Ratio   


 


Procalcitonin  1.89 H  


 


Venous Blood Potassium   5.6 H 


 


Urine Color   


 


Urine Appearance   


 


Urine pH   


 


Ur Specific Gravity   


 


Urine Protein   


 


Urine Glucose (UA)   


 


Urine Ketones   


 


Urine Blood   


 


Urine Nitrate   


 


Urine Bilirubin   


 


Urine Urobilinogen   


 


Ur Leukocyte Esterase   


 


Urine RBC   


 


Urine WBC   


 


Ur Epithelial Cells   


 


Urine Bacteria   














  02/04/19 02/04/19 02/05/19





  22:42 23:15 05:40


 


WBC    12.3 H D


 


RBC    2.76 L


 


Hgb    7.7 L


 


Hct    22.2 L


 


MCV    80.4


 


MCH    27.9


 


MCHC    34.7


 


RDW    14.3


 


Plt Count    239


 


MPV    9.4


 


Neut % (Auto)    95.8 H


 


Lymph % (Auto)    3.4 L


 


Mono % (Auto)    0.7 L


 


Eos % (Auto)    0.1 L


 


Baso % (Auto)    0.0


 


Lymph # (Auto)    0.4 L


 


Mono # (Auto)    0.1


 


Eos # (Auto)    0.0


 


Baso # (Auto)    0.00


 


Absolute Neuts (auto)    11.79 H


 


Neutrophils % (Manual)   


 


Band Neutrophils %   


 


Lymphocytes % (Manual)   


 


Monocytes % (Manual)   


 


Metamyelocytes %   


 


Platelet Evaluation   


 


PT   


 


INR   


 


APTT   


 


D-Dimer, Quantitative   


 


pCO2   


 


pO2   


 


HCO3   


 


ABG pH   


 


ABG Total CO2   


 


ABG O2 Saturation   


 


ABG O2 Content   


 


ABG Base Excess   


 


ABG Hemoglobin   


 


ABG Carboxyhemoglobin   


 


POC ABG HHb (Measured)   


 


ABG Methemoglobin   


 


ABG O2 Capacity   


 


VBG pH   


 


VBG pCO2   


 


VBG HCO3   


 


VBG Total CO2   


 


VBG O2 Sat (Calc)   


 


VBG Base Excess   


 


VBG Potassium   


 


Hgb O2 Saturation   


 


Sodium   


 


Chloride   


 


Glucose   


 


Lactate   


 


FiO2   


 


Blood Gas Comments   


 


Crit Value Called To   


 


Crit Value Called By   


 


Blood Gas Notified Time   


 


Potassium   


 


Carbon Dioxide   


 


Anion Gap   


 


BUN   


 


Creatinine   


 


Est GFR ( Amer)   


 


Est GFR (Non-Af Amer)   


 


Random Glucose   


 


Calcium   


 


Phosphorus   


 


Magnesium   


 


Total Bilirubin   


 


AST   


 


ALT   


 


Alkaline Phosphatase   


 


Total Creatine Kinase   


 


CK-MB (CK-2)   


 


CK-MB (CK-2) %   


 


Troponin I   0.27 H* D 


 


NT-Pro-B Natriuret Pep   


 


Total Protein   


 


Albumin   


 


Globulin   


 


Albumin/Globulin Ratio   


 


Procalcitonin   


 


Venous Blood Potassium   


 


Urine Color  Light yellow  


 


Urine Appearance  Sl cloudy  


 


Urine pH  6.0  


 


Ur Specific Gravity  1.020  


 


Urine Protein  Trace H  


 


Urine Glucose (UA)  Negative  


 


Urine Ketones  Negative  


 


Urine Blood  Moderate H  


 


Urine Nitrate  Negative  


 


Urine Bilirubin  Negative  


 


Urine Urobilinogen  0.2  


 


Ur Leukocyte Esterase  Moderate H  


 


Urine RBC  2 - 5 H  


 


Urine WBC  25 - 30 H  


 


Ur Epithelial Cells  0 - 2  


 


Urine Bacteria  Few  














  02/05/19





  06:20


 


WBC 


 


RBC 


 


Hgb 


 


Hct 


 


MCV 


 


MCH 


 


MCHC 


 


RDW 


 


Plt Count 


 


MPV 


 


Neut % (Auto) 


 


Lymph % (Auto) 


 


Mono % (Auto) 


 


Eos % (Auto) 


 


Baso % (Auto) 


 


Lymph # (Auto) 


 


Mono # (Auto) 


 


Eos # (Auto) 


 


Baso # (Auto) 


 


Absolute Neuts (auto) 


 


Neutrophils % (Manual) 


 


Band Neutrophils % 


 


Lymphocytes % (Manual) 


 


Monocytes % (Manual) 


 


Metamyelocytes % 


 


Platelet Evaluation 


 


PT 


 


INR 


 


APTT 


 


D-Dimer, Quantitative 


 


pCO2  13 L*


 


pO2  154.0 H


 


HCO3  5.3 L*


 


ABG pH  7.22 L


 


ABG Total CO2  5.7 L


 


ABG O2 Saturation  96.2


 


ABG O2 Content  9.0 L


 


ABG Base Excess  -20.5 L


 


ABG Hemoglobin  6.4 L


 


ABG Carboxyhemoglobin  0 L


 


POC ABG HHb (Measured)  3.8


 


ABG Methemoglobin  0.9


 


ABG O2 Capacity  9.4 L


 


VBG pH 


 


VBG pCO2 


 


VBG HCO3 


 


VBG Total CO2 


 


VBG O2 Sat (Calc) 


 


VBG Base Excess 


 


VBG Potassium 


 


Hgb O2 Saturation  95.4


 


Sodium 


 


Chloride 


 


Glucose 


 


Lactate 


 


FiO2  28.0


 


Blood Gas Comments 


 


Crit Value Called To  Vic[facility] moss [facility]


 


Crit Value Called By  Tee


 


Blood Gas Notified Time  633


 


Potassium 


 


Carbon Dioxide 


 


Anion Gap 


 


BUN 


 


Creatinine 


 


Est GFR ( Amer) 


 


Est GFR (Non-Af Amer) 


 


Random Glucose 


 


Calcium 


 


Phosphorus 


 


Magnesium 


 


Total Bilirubin 


 


AST 


 


ALT 


 


Alkaline Phosphatase 


 


Total Creatine Kinase 


 


CK-MB (CK-2) 


 


CK-MB (CK-2) % 


 


Troponin I 


 


NT-Pro-B Natriuret Pep 


 


Total Protein 


 


Albumin 


 


Globulin 


 


Albumin/Globulin Ratio 


 


Procalcitonin 


 


Venous Blood Potassium 


 


Urine Color 


 


Urine Appearance 


 


Urine pH 


 


Ur Specific Gravity 


 


Urine Protein 


 


Urine Glucose (UA) 


 


Urine Ketones 


 


Urine Blood 


 


Urine Nitrate 


 


Urine Bilirubin 


 


Urine Urobilinogen 


 


Ur Leukocyte Esterase 


 


Urine RBC 


 


Urine WBC 


 


Ur Epithelial Cells 


 


Urine Bacteria 














Assessment & Plan





- Assessment and Plan (Free Text)


Plan: 


Assessment


Systemic inflammatory response syndrome, probably due to acute renal failure R/O

acute coronary syndrome in this patient presenting with dehydration and metab

olic acidosis, R/O sepsis


arthritis


chronic right leg rash


S/P hip surgery





Plan


gave a dose of IV Vancomycin and started Cefepime pending blood, urine cx, CXR, 

PCT


follow up further plans of Renal, Cardiology, ICU team 


will monitor clinically

## 2019-02-05 NOTE — CARD
--------------- APPROVED REPORT --------------





Date of service: 02/05/2019



EXAM: Two-dimensional and M-mode echocardiogram with Doppler and 

color Doppler.



INDICATION

ELEVATED BNP



2D DIMENSIONS 

Left Atrium (2D)3.9   (1.6-4.0cm)IVSd1.0   (0.7-1.1cm)

LVDd4.3   (3.9-5.9cm)PWd1.2   (0.7-1.1cm)

LVDs3.0   (2.5-4.0cm)FS (%) 28.8   %

LVEF (%)55.7   (>50%)



M-Mode DIMENSIONS 

Aortic Root3.50   (2.2-3.7cm)Aortic Cusp Exc.1.70   (1.5-2.0cm)



Aortic Valve

AoV Peak Jqjauxcu604.0cm/Radha Peak GR.18mmHg



Mitral Valve

MV E Oknnocva53.6cm/sMV A Hwddtren016.0cm/sE/A ratio0.8



TDI

Lateral E' Peak V11.40cm/sMedial E' Peak V6.34cm/sE/Lateral E'8.4

E/Medial E'15.1



Pulmonary Valve

PV Peak Guhmzduo00.7cm/sPV Peak Grad.4mmHg



Tricuspid Valve

TR Peak Xrqiizvr370av/sRAP [id number] Peak Gr.35mmHg

[id number]



 LEFT VENTRICLE 

The left ventricle is normal size. There is normal left ventricular 

wall thickness. The left ventricular function is normal. The left 

ventricular ejection fraction is within the normal range. There is 

normal LV segmental wall motion. Transmitral Doppler flow pattern is 

Grade I-abnormal relaxation pattern.



 RIGHT VENTRICLE 

The right ventricle is normal size. There is normal right ventricular 

wall thickness. The right ventricular systolic function is normal.



 ATRIA 

The left atrium size is normal. The right atrium size is normal.



 AORTIC VALVE 

The aortic valve is moderately thickened. There is trace aortic 

regurgitation. There is no aortic valvular stenosis. 



 MITRAL VALVE 

The mitral valve is mildly thickened. Mitral regurgitation is trace. 

There is no mitral valve stenosis.



 TRICUSPID VALVE 

There is mild tricuspid regurgitation. There is mild to moderate 

pulmonary hypertension.



 PULMONIC VALVE 

The pulmonary valve is normal in structure. There is no pulmonic 

valvular regurgitation. 



 GREAT VESSELS 

The aortic root is normal in size. The IVC is normal in size and 

collapses >50% with inspiration.



<Conclusion>

There is normal left ventricular wall thickness.

The left ventricular function is normal.

The left ventricular ejection fraction is within the normal range.

There is normal LV segmental wall motion.

Transmitral Doppler flow pattern is Grade I-abnormal relaxation 

pattern.

There is mild tricuspid regurgitation.

There is mild to moderate pulmonary hypertension.

## 2019-02-05 NOTE — US
Date of service: 



02/05/2019



PROCEDURE:  Ultrasound of the Kidneys



HISTORY:

ROXANE



COMPARISON:

None available.



TECHNIQUE:

Sonogram of the kidneys.



FINDINGS:



RIGHT KIDNEY:

Measures: 9.5 cm. 



Normal in size, contour and echogenicity. 



No stone, solid mass lesion or hydronephrosis visualized. 



LEFT KIDNEY:

Measures: 11.0 cm. 



Normal in size, contour and echogenicity. 



11 mm nonobstructing calculus lower pole.  No mass.  No 

hydronephrosis.  



OTHER FINDINGS:

None.



IMPRESSION:

11 mm nonobstructing calculus lower pole left kidney.  Otherwise 

unremarkable.

## 2019-02-05 NOTE — US
HISTORY:

Leg pain and swelling. Evaluate for DVT



PHYSICIAN(S):  Jona Alegre MD.



TECHNIQUE:

Duplex sonography and color-flow Doppler with graded compression were 

used to evaluate the deep venous systems of both lower extremities. 



FINDINGS:

The visualized deep venous systems of both lower extremities are 

sonographically normal and compressible. Normal wave forms and 

augmentation are seen. There is no sonographic evidence for deep 

venous thrombosis in the visualized segments of both lower 

extremities.



IMPRESSION:

No sonographic evidence for deep venous thrombosis in the visualized 

segments of both lower extremities.

## 2019-02-05 NOTE — PN
DATE:  2019



SUBJECTIVE:  The patient is seen and examined at bedside.  He is

comfortable, however, appears in mild respiratory distress.  However, able

to talk in full sentences and denies difficulty breathing .  He is alert awake 
and oriented. His last night was uneventful.  He made 700 mL

of urine last night.  His pH has improved to 7.22; however, he is still

complaining of tiredness.



PHYSICAL EXAMINATION:

VITAL SIGNS:  Heart rate 106, blood pressure 123/65, oxygen saturation 99%

on 2 L nasal cannula, and temperature of 99.1.

ENT:  Head and neck atraumatic.

LUNGS:  Few crackles bilaterally.

HEART:  Regular rate and rhythm.  S1 and S2 distant.

ABDOMEN:  Soft, nontender, nondistended.

MUSCULOSKELETAL:  1+ bilateral pedal and ankle edema.

NEUROLOGIC:  The patient moves all extremities spontaneously.

SKIN:  Moist.

PSYCHIATRIC:  The patient is alert, awake and oriented x3.



LABORATORY DATA:  Sodium 136, potassium 4.7, chloride 103, carbon dioxide

7, , creatinine 16.6 down from 18.3, glucose 155.  Troponin 0.95 up

from 0.27.  AB.22/13/154 on 2 L nasal cannula.  INR 1.29.  PT 38.1. 

Urine is positive for wbc's, bacteria, moderate leukocyte esterase,

negative for nitrites.



MEDICATIONS:  DuoNeb p.r.n., aspirin, Lipitor, PhosLo, RNS, heparin subcu,

cefepime, Protonix, sodium bicarbonate 0.3 mL per hour, vitamin D, vitamin

C.



ASSESSMENT AND PLAN:  This is a 68-year-old gentleman who presented with

severe acute kidney injury in the setting of urinary tract infection. 

Possibility of sepsis and as a result, sepsis related acute tubular

necrosis leading to acute kidney injury cannot be ruled out. Possibility of 
NSAIDS 

related ATN is also there as patient reports using ibuprofen for arthritis pain 
recently. At present

time, I will proceed with CAT scan of the abdomen and pelvis to rule out

intra-abdominal causes for urinary tract infection and acute kidney injury.

Meanwhile, I discussed the situation with nephrology service, Dr. Cardoso, and

I will put dialysis catheter for emergent dialysis.  The patient is on

broad-spectrum antibiotics.  Septic workup is ongoing including blood

culture, urine culture, procalcitonin.  Chest x-ray did not show any acute

pulmonary disease.  CAT scan of the abdomen and pelvis will allow us to

rule out intra-abdominal source of infection.  Echocardiogram is ordered. 

We will continue target euvolemia, glycemia, normothermia and saturation

more than 90%.  We will continue with deep venous thrombosis and

gastrointestinal prophylaxis.



Addendum: CTAP: showed mild left hydronephrosis in the setting of 9 mm 
obstructing stone and Dr. López was requested by Dr. Cardoso for consult

                  Later in the day spoke with Dr. López () and Dr. Jona Alegre 
(IR)-->decision was made to proceed with ureteral stenting in am. of note 
patient is hemodynamically and respiratory wise stable, protecting airways, 
alert, awake and oriented, ate dinner, but will be NPO after midnight. Spoke 
with dr. Cardoso to arrange for HD earlier in am tomorrow before the procedure.



ccm time 40 min



__________________________________________

Dakota Allen MD





DD:  2019 10:30:37

DT:  2019 10:34:12

Job # [id number]



MTDELIGIO

## 2019-02-05 NOTE — HP
HISTORY OF PRESENT ILLNESS:  

The patient is a 68-year-old man with no significant past medical history who 
presented for evaluation of a 2 week history of progressively worsening dyspnea,
pedal edema, and 3-pillow orthopnea.  The patient was in his usual state of 
health until approximately 2 weeks prior to presentation to the ED when he 
developed the aforementioned symptoms.  He furthermore endorsed cough productive
of intermittently clear sputum but denied fevers, chills, rigors, hemoptysis, or
chest pain associated with these symptoms.  He also reports decreased urinary 
output for several days associated with nausea and vomiting but denied 
hematemesis, melena, or hematochezia.  The patient has poor follow-up with his 
PMD and was last seen in the office in 10/2016.  In the ED he was noted to be in
moderate respiratory distress but was otherwise afebrile and hemodynamically 
stable.  Laboratory studies demonstrated multiple derangements with marked 
leukocytosis (WBC 23), ROXANE (, Cr 18) and a BNP of 14,900.  The patient 
was promptly evaluated by the ICU team and subsequently admitted for continued 
management.



PAST MEDICAL HISTORY:  

Osteoarthritis of bilateral hips.



PAST SURGICAL HISTORY:  

Bilateral total hip replacement and bilateral cataract removal.



ALLERGIES: 

NKDA.



MEDICATIONS:  

None.



FAMILY HISTORY:  

Significant for hypertension and kidney disease in his mother.



SOCIAL HISTORY:  

The patient reports a former 40-pack-year smoking history but quit 7 years ago. 
He reports former heavy alcohol use but quit 4 years ago.  He denies history of 
illicit drug abuse.



REVIEW OF SYSTEMS:  

A 12-point review of systems is negative except as per HPI.



PHYSICAL EXAMINATION:

VITAL SIGNS:  Temperature 96.6, pulse 108, blood pressure 129/61, respiratory 
rate 23, and oxygen saturation 98% on 2L NC.

GENERAL:  Somnolent, obese man lying in bed in no apparent distress.

HEENT:  PERRL, EOMI.  No scleral icterus.  Conjunctival pallor is noted.

NECK:  No JVD.

LUNGS:  Decreased breath sounds at the bases with few scattered rhonchi.

CARDIOVASCULAR:  Regular rate and rhythm.  Normal S1 and S2.  Grade II/VI murmur
to LLSB.  No friction rub.

ABDOMEN:  Normoactive bowel sounds.  Soft, nontender, and nondistended.

EXTREMITIES:  Trace pedal edema bilaterally.

NEUROLOGIC:  Somnolent, but arousable, oriented to person and place.  Moving all
extremities.  Able to follow simple commands.



LABORATORY DATA: 

WBC 12.3 with 96% neutrophils, hemoglobin 7.7, hematocrit 22, and platelets 239.


Sodium 136, potassium 4.7, chloride 103, bicarb 7, , creatinine 18, and 
glucose 155. 

Troponin 0.11, 0.27, and 0.95. 

BNP 14,900.  Procalcitonin 1.89.  

Urinalysis with light yellow, cloudy urine with moderate blood, moderate 
leukocyte esterase and negative nitrites.



IMAGING STUDIES:  

Chest x-ray demonstrated no active disease.



ASSESSMENT:  

The patient is a 68-year-old man with no significant past medical history who 
presented with a 2 week history of dyspnea with exertion, pedal edema, 3-pillow 
orthopnea and decreased urinary output and was admitted to the ICU for 
management of ROXANE, anion gap metabolic acidosis, SIRS syndrome and anemia.



PLAN:

1.  ROXANE on CKD.  Labs from 8/2016 demonstrated a Cr of 2.2.  Evaluation with Dr. Cardoso pending.  Continue with gentle IV fluid hydration, monitoring strict I&O's,
renally dosing medications and avoiding nephrotoxins.  If renal parameters do 
not improve the patient may require renal replacement therapy.

2.  Elevated troponin, consider secondary to impaired renal clearance in the 
setting of profound ROXANE vs NSTEMI.  The patient remains hemodynamically stable 
and chest pain free.  Evaluation with Dr. Condon is pending.  TTE pending.  We 
will start Aspirin 81 mg p.o. daily and Lipitor 40 mg p.o. daily.

3.  Anion gap metabolic acidosis, consider secondary to profound uremia in the 
setting of acute kidney injury.  The patient remains on a bicarb infusion and is
pending evaluation with Dr. Cardoso.  Continue to monitor serial ABGs, urine drug 
screen pending.

4.  Elevated BNP.  TTE pending to assess for wall motion abnormalities and 
valvular dysfunction.  Cardiology evaluation with Dr. Condon pending.

5.  SIRS.  Input from Dr. Lloyd noted.  The patient has been pancultured and we 
will await results.  Continue to monitor for fever and leukocytosis. Continue 
with antimicrobials as per Dr. Lloyd.

6.  Normocytic anemia.  We will check stool for occult blood and iron levels.  
We will continue to monitor CBC and transfuse as needed.  Of note, laboratory 
studies from 8/2016 demonstrated a Hb of 14.

7.  Prophylaxis.  Continue Protonix for GI prophylaxis and Heparin for DVT 
prophylaxis.





CODE STATUS:  Full code.





__________________________________________

Anatoliy Osman MD





DD:  02/05/2019 9:23:17

DT:  02/05/2019 9:28:30

Job # [id number]

[email or web address]

## 2019-02-05 NOTE — RAD
Date of service: 



02/05/2019



HISTORY:

 placement 



COMPARISON:

2/4/2019 



FINDINGS:



LUNGS:

No active pulmonary disease.



PLEURA:

No significant pleural effusion identified, no pneumothorax apparent.



CARDIOVASCULAR:

No aortic atherosclerotic calcification present.



Normal cardiac size. No congestive change.  Right tunneled central 

venous dialysis catheter new since prior.



OSSEOUS STRUCTURES:

No significant abnormalities.



VISUALIZED UPPER ABDOMEN:

Normal.



OTHER FINDINGS:

None.



IMPRESSION:

New right tunneled central venous dialysis catheter otherwise no 

significant change.

## 2019-02-06 LAB
ALBUMIN SERPL-MCNC: 3 G/DL (ref 3–4.8)
ALBUMIN SERPL-MCNC: 3.3 G/DL (ref 3–4.8)
ALBUMIN/GLOB SERPL: 1 {RATIO} (ref 1.1–1.8)
ALBUMIN/GLOB SERPL: 1 {RATIO} (ref 1.1–1.8)
ALT SERPL-CCNC: 36 U/L (ref 7–56)
ALT SERPL-CCNC: 39 U/L (ref 7–56)
APTT BLD: 33.8 SECONDS (ref 26.9–38.3)
AST SERPL-CCNC: 40 U/L (ref 17–59)
AST SERPL-CCNC: 50 U/L (ref 17–59)
BASOPHILS # BLD AUTO: 0 K/MM3 (ref 0–2)
BASOPHILS # BLD AUTO: 0.01 K/MM3 (ref 0–2)
BASOPHILS NFR BLD: 0 % (ref 0–3)
BASOPHILS NFR BLD: 0.1 % (ref 0–3)
BUN SERPL-MCNC: 126 MG/DL (ref 7–21)
BUN SERPL-MCNC: 181 MG/DL (ref 7–21)
CALCIUM SERPL-MCNC: 6.5 MG/DL (ref 8.4–10.5)
CALCIUM SERPL-MCNC: 6.6 MG/DL (ref 8.4–10.5)
EOSINOPHIL # BLD: 0 10*3/UL (ref 0–0.7)
EOSINOPHIL # BLD: 0 10*3/UL (ref 0–0.7)
EOSINOPHIL NFR BLD: 0 % (ref 1.5–5)
EOSINOPHIL NFR BLD: 0.1 % (ref 1.5–5)
ERYTHROCYTE [DISTWIDTH] IN BLOOD BY AUTOMATED COUNT: 14.3 % (ref 11.5–14.5)
ERYTHROCYTE [DISTWIDTH] IN BLOOD BY AUTOMATED COUNT: 14.5 % (ref 11.5–14.5)
GFR NON-AFRICAN AMERICAN: 4
GFR NON-AFRICAN AMERICAN: 6
HDLC SERPL-MCNC: 18 MG/DL (ref 29–60)
HGB BLD-MCNC: 6.6 G/DL (ref 14–18)
HGB BLD-MCNC: 8.8 G/DL (ref 14–18)
INR PPP: 1.33
LDLC SERPL-MCNC: 55 MG/DL (ref 0–129)
LYMPHOCYTES # BLD: 0.8 10*3/UL (ref 1.2–3.4)
LYMPHOCYTES NFR BLD AUTO: 10.3 % (ref 22–35)
MCH RBC QN AUTO: 28 PG (ref 25–35)
MCH RBC QN AUTO: 28.6 PG (ref 25–35)
MCHC RBC AUTO-ENTMCNC: 34.9 G/DL (ref 31–37)
MCHC RBC AUTO-ENTMCNC: 35.1 G/DL (ref 31–37)
MCV RBC AUTO: 80.1 FL (ref 80–105)
MCV RBC AUTO: 81.5 FL (ref 80–105)
MONOCYTES # BLD AUTO: 0.3 10*3/UL (ref 0.1–0.6)
MONOCYTES # BLD AUTO: 0.4 10*3/UL (ref 0.1–0.6)
MONOCYTES NFR BLD: 3.1 % (ref 1–6)
MONOCYTES NFR BLD: 3.1 % (ref 1–6)
PLATELET # BLD: 182 10^3/UL (ref 120–450)
PLATELET # BLD: 192 10^3/UL (ref 120–450)
PMV BLD AUTO: 9.1 FL (ref 7–11)
PMV BLD AUTO: 9.3 FL (ref 7–11)
PROTHROMBIN TIME: 15 SECONDS (ref 9.4–12.5)
RBC # BLD AUTO: 2.36 10^6/UL (ref 3.5–6.1)
RBC # BLD AUTO: 3.08 10^6/UL (ref 3.5–6.1)
TROPONIN I SERPL-MCNC: 1.75 NG/ML
TROPONIN I SERPL-MCNC: 2.23 NG/ML
WBC # BLD AUTO: 13.4 10^3/UL (ref 4.5–11)
WBC # BLD AUTO: 8.1 10^3/UL (ref 4.5–11)

## 2019-02-06 PROCEDURE — 5A1D70Z PERFORMANCE OF URINARY FILTRATION, INTERMITTENT, LESS THAN 6 HOURS PER DAY: ICD-10-PCS

## 2019-02-06 PROCEDURE — 30233N1 TRANSFUSION OF NONAUTOLOGOUS RED BLOOD CELLS INTO PERIPHERAL VEIN, PERCUTANEOUS APPROACH: ICD-10-PCS | Performed by: INTERNAL MEDICINE

## 2019-02-06 RX ADMIN — NYSTATIN SCH APPLIC: 100000 CREAM TOPICAL at 09:00

## 2019-02-06 RX ADMIN — Medication SCH: at 12:46

## 2019-02-06 RX ADMIN — NYSTATIN SCH APPLIC: 100000 CREAM TOPICAL at 13:46

## 2019-02-06 RX ADMIN — ERGOCALCIFEROL SCH CAP: 1.25 CAPSULE ORAL at 17:47

## 2019-02-06 RX ADMIN — NYSTATIN SCH APPLIC: 100000 CREAM TOPICAL at 18:08

## 2019-02-06 RX ADMIN — METOPROLOL SUCCINATE SCH MG: 25 TABLET, EXTENDED RELEASE ORAL at 13:47

## 2019-02-06 RX ADMIN — CEFEPIME SCH MLS/HR: 1 INJECTION, SOLUTION INTRAVENOUS at 17:48

## 2019-02-06 NOTE — CON
DATE:  02/05/2019



REQUESTING PHYSICIAN:  Dr. Osman



REASON FOR CONSULTATION:  Elevated troponin.



HISTORY:  This is a 68-year-old man with a history of arthritis and

bilateral hip replacement, who presented to the emergency room with

worsening dyspnea, leg edema and orthopnea.  He had a productive cough at

home.  He reportedly had reduced urine output as well with intermittent

vomiting.  Upon admission, he was noted to have leukocytosis, anemia,

severe metabolic acidosis and severe renal failure with a BUN and

creatinine of 230 and 18.3.  His troponin has been elevated to 0.95 and

evaluation was requested.  He is seen in the ICU.  He appears somewhat

confused.  He denies any prior cardiac history.



MEDICATIONS AT HOME:  Appear to be none.



ALLERGIES:  NONE.



As mentioned, he has undergone bilateral hip replacement as well as

cataract surgery.



FAMILY HISTORY:  There is some unspecified hypertension issues and renal

disease in his family.



SOCIAL HISTORY:  He is a former smoker for many years but quit 7 years ago.

He has a history of alcohol abuse in the past but none recently.



REVIEW OF SYSTEMS:  Ten-point review of systems is notable mainly for the

problems mentioned above.



PHYSICAL EXAMINATION:

GENERAL:  He is a somewhat disheveled-appearing middle-aged man.

VITAL SIGNS:  His blood pressure was 122/66 with pulse of 110 and sinus,

respirations are 16.  He is afebrile.

HEENT:  Normocephalic, atraumatic.

NECK:  Supple.  No JVD noted.

CHEST:  A few scattered rhonchi heard.

HEART:  PMI displaced laterally with a systolic murmur at the lower left

sternal border.

ABDOMEN:  Soft, nontender, with normoactive bowel sounds.

EXTREMITIES:  1+ leg edema.

PSYCHIATRIC:  Affect appears somewhat flat and mildly disoriented.



DIAGNOSTIC DATA:  White count is 21.9 with hemoglobin and hematocrit of 8.6

and 25.2, platelet count is 268,000.  PT and INR of 14.3 and 1.29.  D-dimer

5760.  Initial arterial blood gas revealed a pH of 6.98, pCO2 of 13 and pO2

of 142; repeat is 7.22, pCO2 is 13, pO2 of 154.  Potassium is 4.7.  BUN and

creatinine of 234 and 16.6.  Troponin is 0.227, repeat is 0.95.  Toxicology

screen was negative.



Electrocardiogram reveals sinus rhythm with first-degree AV block and

nonspecific ST-T abnormalities, QT prolongation is noted.  Chest x-ray

reveals normal cardiac silhouette with mild increased pulmonary vascular

markings.  Venous duplex scan showed no evidence of deep vein thrombosis.



IMPRESSION:

1.  Elevated troponin, likely due to reduced renal clearance.  No clear

evidence of acute myocardial infarction.

2.  Renal failure with multiple metabolic abnormalities including severe

metabolic acidosis, etiology unclear.

3.  Anemia.

4.  Leukocytosis.



RECOMMENDATIONS:  Initiation of dialysis appears appropriate at the present

time.  A followup troponin will be schedule for the morning.  An

echocardiogram has been ordered and will be reviewed.  Evaluation for

causes of his renal failure is in progress.



Thank you for this consultation.  We will be happy to follow along through

his hospital course as needed.





__________________________________________

Rm Garay MD





DD:  02/05/2019 22:09:21

DT:  02/05/2019 22:13:47

Job # [id number]

## 2019-02-06 NOTE — PN
DATE:  02/06/2019



SUBJECTIVE:  The patient is seen lying in bed in the ICU.  He feels

somewhat better today.  A hemodialysis catheter was placed yesterday and

dialysis is scheduled for today.  He remains severely anemic.  He denies

any chest pain.  He states his dyspnea is improved.



CURRENT MEDICATIONS:  Include Aranesp, aspirin, DuoNeb inhaler, Flomax,

Lipitor, cefepime, PhosLo, Protonix.



OBJECTIVE:

GENERAL:  He is a middle-aged man who appears somewhat disheveled.

VITAL SIGNS:  Blood pressure is 110/60 with a pulse of 80 and sinus

respirations are 16.  He is afebrile.

HEENT:  Dialysis catheter as noted on the right.

CHEST:  Diminished breath sounds at the bases.

HEART:  PMI displaced laterally with systolic murmur at the left sternal

border.

ABDOMEN:  Soft and nontender with normoactive bowel sounds.

EXTREMITIES:  1+ leg edema.



DIAGNOSTIC DATA:  White count is 8.1, hemoglobin and hematocrit 6.6 and

18.9 with a platelet count of 192,000.  MCV is 80.  PT/INR 15 and 33.8. 

Potassium is 3.1, BUN and creatinine 181 and 13.5, glucose 164.  Troponin

is 2.23.  Echocardiogram reveals normal LV size with normal LV systolic

function, mild tricuspid regurgitation.



IMPRESSION:

1.  Acute renal failure, etiology uncertain.

2.  Elevated troponin likely due to reduced renal clearance.  No clear

evidence of myocardial fraction at the present time.

3.  Severe anemia, now worsened.



RECOMMENDATIONS:  Urgent dialysis should proceed.  Transfusion to

hemoglobin of greater than 8 is advised.  Repeat troponin will be planned

for the morning and electrocardiogram will be repeated as well.  An

eventual stress test will be planned as well.  Low-dose beta-blocker

therapy will be initiated and titrated as tolerated.



We will continue to follow and make further recommendations as appropriate.







__________________________________________

Rm Garay MD





DD:  02/06/2019 10:47:47

DT:  02/06/2019 10:49:40

Job # [id number]

MTDD

## 2019-02-06 NOTE — CON
DATE:  02/06/2019



GENITOURINARY CONSULTATION



CHIEF COMPLAINT:  Chest pain and shortness of breath.



HISTORY OF PRESENT ILLNESS:  This is a 68-year-old male, who is seen in the

ICU at [facility].  The patient was admitted a few days ago

with chest pain and progressive shortness of breath.  The patient felt he

was having a cardiac issue and presented to the hospital.  On admission,

the patient reports he was having worsening shortness of breath and

swelling of his lower extremities.  He also was complaining of a cough. 

Urologically, he reports he had been voiding without difficulty until that

morning.  He had no dysuria, frequency, urgency or gross hematuria, in that

morning of admission he felt he tried to go to the bathroom, although he

did not feel like he needed to, but no urine then came out.  During his

admission, the patient was found to have renal failure.  He has been

started on dialysis for fluid overload and congestive heart failure with a

BNP of 14,900.  He had a CT scan done, which showed a 9 mm ureteral

calculus and a  consultation was then requested.  The patient reports no

prior history of kidney stones.  He denies any symptoms of renal colic.  He

does report that he had been voiding well prior to admission, but he does

report noticing some decreasing urine output in the days leading up to

admission.



PAST MEDICAL HISTORY:  Significant for arthritis.



PAST SURGICAL HISTORY:  Bilateral hip replacements and bilateral cataracts.



MEDICATIONS AT HOME:  None.



ALLERGIES:  NONE.



CURRENT MEDICATIONS:  Medications currently include Aranesp, aspirin,

Drisdol, DuoNeb, Flomax, Lipitor, Maxipime, nystatin, Nephro-Seda, PhosLo,

Protonix, Toprol, the patient just received blood transfusions.



REVIEW OF SYSTEMS:  Twelve-point review of systems was obtained.  The

patient reports currently has some mild shortness of breath, although this

is improved.  No further chest pain.  No palpitations.  He does report some

weakness and lethargy.  Does have bilateral hip and lower extremity pain

and swelling.  Other systems reportedly negative.



PHYSICAL EXAMINATION:

VITAL SIGNS:  The patient has been afebrile.  Blood pressure 110/70,

respirations 22, pulse 80.

GENERAL:  He is in no acute distress.

NECK:  His neck is supple.  There is no adenopathy.

CHEST:  Exam of his chest reveals somewhat increased inspiratory effort.

CARDIAC:  Exam shows positive S1, S2.  There is moderate peripheral edema

of his lower extremities noted.

ABDOMEN:  The abdomen is soft, nontender, nondistended, and he is obese. 

There is no rebound or guarding.  There is no apparent costovertebral angle

tenderness.

GENITOURINARY:  Phallus is normal.  There is a Reyes catheter in place,

draining scant amounts of clear urine.  Scrotum is normal.  Testes

bilaterally descended, nontender, no masses.  Epididymis are normal.

EXTREMITIES:  There is no cyanosis, but there is positive peripheral edema

noted.



LABORATORY DATA:  WBC count has come down to 8.1 from 23 on admission,

hemoglobin 6.6 prior to the blood transfusions, and platelets 192.  BUN

today is 181 with a creatinine of 13.5.  GFR of 4.  The patient had

elevated CPK.  He had a BNP of 14,900.



RADIOLOGIC DATA:  The patient had a CT scan of the abdomen and pelvis done

from yesterday as well as a renal ultrasound.  CT scan showed mild left

hydro with an 8 mm lower pole calculus and a 10 mm obstructing mid left

ureteral calculus.  Distal ureter could not be seen, as there was artifact

from the hip replacements.  Renal ultrasound also done yesterday.  There

were no stones, solid mass or hydronephrosis visualized.



IMPRESSION AND PLAN:  This is a 68-year-old male with renal failure from

unknown origin.  Urologically, the patient does not appear to have sepsis. 

He may have had some issues when he first came in, but he has responded

well to fluids, antibiotics and dialysis.  He is currently stable.  He has

been afebrile.  His white count has come back down to normal.  Blood

cultures are no growth after 48 hours.  No urine culture is available.  I

have discussed this case at length with the intensivist and medical staff. 

Plan will be to place a left ureteral stent given there is an obstructing

stone.  I doubt this will help his renal function as his other kidney

should be functioning well enough, there does not appear to be any

obstruction on that side, but I will do bilateral retrogrades.  When the

patient has medically improved, we will need to plan on a ureteroscopy with

stone removal at some point.  However, given the patient is critically ill

at this time, we will perform an only a stent placement to unobstruct his

kidney to prevent any buildup of infection proximal to the stone and

hopefully to improve his renal function.  Please keep the patient n.p.o.

after midnight tonight.  Stent placement was scheduled for today.  However,

given the patient's marked anemia, he was felt not to be a stable candidate

for anesthesia by Anesthesia; therefore, the plan was changed to transfuse

the patient, continue to remove fluid and place the stent tomorrow as it is

scheduled.







__________________________________________

Raymond López MD





DD:  02/06/2019 18:15:21

DT:  02/06/2019 18:20:20

Job # [id number]

## 2019-02-06 NOTE — PN
SUBJECTIVE:  

The patient was seen and examined at bedside in the ICU.  He remains afebrile 
and hemodynamically stable.  He is s/p placement of dialysis access catheter and
was started on renal replacement therapy which he tolerated without difficulty. 
This morning he feels okay but tired and otherwise offers no complaints.



OBJECTIVE:

VITAL SIGNS:  Temperature 98.1, pulse 78, blood pressure 121/66, respiratory 
rate 20, oxygen saturation 99% on 2L NC.

GENERAL:  Obese man lying in bed in no apparent distress.

HEENT:  PERRL, EOMI.  No scleral icterus.  Conjunctival pallor is noted.

NECK:  No JVD.

LUNGS:  Decreased breath sounds at the bases with few scattered rhonchi.

CARDIOVASCULAR:  Regular rate and rhythm.  Normal S1, S2.  Grade II/VI murmur to
LLSB.  No friction rub.

ABDOMEN:  Normoactive bowel sounds, soft, nontender, nondistended.

EXTREMITIES:  Trace pedal edema bilaterally.

NEUROLOGIC:  Awake, alert and oriented x 3.  No focal motor deficits.



LABORATORY DATA:  

WBC 8 with 86% neutrophils, hemoglobin 6.6, hematocrit 19, platelets 192.  

Sodium 136, potassium 3.1, chloride 100, bicarb 17, , creatinine 13.5, 
glucose 164, calcium 6.5, phosphorus 9.4.

Troponin 2.23.



IMAGING STUDIES:  

1.  Renal ultrasound demonstrated an 11 mm nonobstructing calculus to the lower 
pole of the left kidney but otherwise no acute pathology.

2.  CT of the abdomen and pelvis without contrast demonstrated obstructing 9 mm 
mid left ureteral calculus with mild left hydronephrosis but otherwise no acute 
pathology.



DIAGNOSTIC STUDIES:  

TTE demonstrated normal LV size and function with preserved ejection fraction 
with mild-to-moderate pulmonary hypertension.



ASSESSMENT:  

The patient is a 68-year-old man with no significant past medical history who 
presented with a 2 week history of dyspnea with exertion, pedal edema, 3-pillow 
orthopnea and decreased urinary output and was admitted to the ICU for 
management of ROXANE on CKD, anion gap metabolic acidosis, SIRS syndrome and 
anemia.



PLAN:

1.  ROXANE on CKD stage III.  Input from Dr. Cardoso noted and the patient has been 
started on renal replacement therapy.  Continue with care as per Dr. Cardoso.  
Workup is ongoing workup to further investigate the etiology of his acute kidney
injury.

2.  Elevated troponin, consider secondary to impaired renal clearance vs NSTEMI.
 Input from Dr. Garay noted.  The patient remains chest-pain free.  Continue 
with care as per Dr. Garay.

3.  Anion gap metabolic acidosis, consider secondary to profound uremia in the 
setting of ROXANE, improving.  Input from Dr. Cardoso noted and as above the patient 
has been started on bicarb infusion and renal replacement therapy.  Continue to 
monitor serial ABGs.

4.  Obstructive nephrolithiasis.  Imaging studies reviewed and Dr. López of 
Urology has been consulted for further evaluation.

5.  SIRS syndrome.  Input from Dr. Lloyd noted. Blood cultures remain negative. 
Continue with antimicrobials as per Dr. Lloyd.

6.  Elevated BNP, etiology likely secondary to underlying renal dysfunction.  
Input from Dr. Garay noted.  TTE demonstrates no wall motion abnormality or 
significant valvular dysfunction.  Continue with care as per Dr. Garay.

7.  Normocytic anemia.  Workup is ongoing.  The patient has been started on 
Aranesp.  We will continue to monitor CBC and transfuse as needed.

8.  Prophylaxis.  Continue Protonix for GI prophylaxis and Heparin for DVT 
prophylaxis.





CODE STATUS:  Full code.





__________________________________________

Anatoliy Osman MD





DD:  02/06/2019 9:11:55

DT:  02/06/2019 9:14:36

Job # [id number]



MTDD

## 2019-02-06 NOTE — CP.PCM.CON
History of Present Illness





- History of Present Illness


History of Present Illness: 





Seen and examined at bedside in ICU this afternoon.





Request for consult is Anemia.





HPI: This is a 68-year-old man with a past medical history that includes 

osteoarthritis of hips, total hip replacement came to the emergency room with 

complaints of worsening dyspnea, and pedal edema. On evaluation he was found to 

luekocytosis and in acute renal failure. , creatinine 18, WBC 23. He did 

report decrease urine output. He is reported to poor to follow up. During 

routine blood work his hemoglobin is noted to be declining. On admission hgb was

9.0 and today it is 6.6. No reports of N/V, hematemesis, abdominal pain, or 

dyspepsia. Reports regular bowel movement but recently notice stool appear 

"muddy", no BRBPR. He admit to taking Aleve 1-2/day a few times a week for years

for Arthritic pain. Denies weight loss or appetite. He has never had need to 

take antacids. Never had EGD or colon. Had dialysis today and at time of visit 

was receiving a unit of PRBC.  Ct scan done on admission(2/5/19) with no 

contrast showed unremarkable bowel, there is a 9mm left ureteral calculi with 

left hydronephrosis, 8mm nonobstructing renal calculi, bilateral pleural 

effusion. (see [employer] [facility] for full report).





PMH: Osteoarthritis of balateral hips, NSAID use


PSH: hip replacement, cataract sx


ALLERGIES:NKDA


MEDS: reviewed as per MAR, at home took Aleve prn


Family HX: father: throat/lung cacer


Social HX: former smoker, quit 7 yrs ago/former ETOH, quit 4 years ago, denies 

recreational drugs.


ENDO HX: never had EGD or COLON





Past Patient History





- Past Social History


Smoking Status: Former Smoker





- CARDIAC


Hx Cardiac Disorders: No





- PULMONARY


Hx Respiratory Disorders: Yes (SMOKED CIGARETTES 1 PPD QUIT 2012.)





- NEUROLOGICAL


Hx Neurological Disorder: No





- HEENT


Hx HEENT Problems: No





- RENAL


Hx Chronic Kidney Disease: Yes


Hx Renal Failure: Yes (5-2-19)





- ENDOCRINE/METABOLIC


Hx Endocrine Disorders: No





- HEMATOLOGICAL/ONCOLOGICAL


Hx Blood Disorders: No





- INTEGUMENTARY


Hx Dermatological Problems: Yes


Hx Psoriasis: Yes (BILATERAL LE)





- MUSCULOSKELETAL/RHEUMATOLOGICAL


Hx Musculoskeletal Disorders: Yes


Hx Arthritis: Yes


Hx Falls: Yes





- GASTROINTESTINAL


Hx Gastrointestinal Disorders: No





- GENITOURINARY/GYNECOLOGICAL


Hx Genitourinary Disorders: No





- PSYCHIATRIC


Hx Psychophysiologic Disorder: No


Hx Substance Use: No





- SURGICAL HISTORY


Hx Surgeries: Yes (BILATERAL HIP SX 2016)





- ANESTHESIA


Hx Anesthesia: No


Hx Anesthesia Reactions: No


Hx Malignant Hyperthermia: No





Meds


Allergies/Adverse Reactions: 


                                    Allergies











Allergy/AdvReac Type Severity Reaction Status Date / Time


 


No Known Allergies Allergy   Verified 02/04/19 19:36














- Medications


Medications: 


                               Current Medications





Albuterol/Ipratropium (Duoneb 3 Mg/0.5 Mg (3 Ml) Ud)  3 ml IH Q2H PRN


   PRN Reason: Shortness of Breath


Aspirin (Aspirin Chewable)  81 mg PO DAILY [facility]


   Last Admin: 02/06/19 12:45 Dose:  Not Given


Atorvastatin Calcium (Lipitor)  40 mg PO DIN [facility]


   Last Admin: 02/05/19 17:22 Dose:  40 mg


Calcium Acetate (Phoslo)  2,001 mg PO WM [facility]


   Last Admin: 02/06/19 12:46 Dose:  Not Given


Darbepoetin Balta (Aranesp)  100 mcg IVP QWK [facility]


   Last Admin: 02/05/19 13:50 Dose:  100 mcg


Cefepime HCl (Maxipime 1gm)  1 gm in 100 mls @ 100 mls/hr IVPB Q24H [facility]; 

Protocol


   Last Admin: 02/05/19 17:45 Dose:  100 mls/hr


Metoprolol Succinate (Toprol Xl)  25 mg PO BRK [facility]


Nystatin (Mycostatin Cream)  0 ea TOP TID [facility]


   Last Admin: 02/05/19 18:51 Dose:  1 applic


Pantoprazole Sodium (Protonix Inj)  40 mg IVP DAILY [facility]


   Last Admin: 02/06/19 09:32 Dose:  40 mg


Tamsulosin HCl (Flomax)  0.4 mg PO DAILY [facility]


   Last Admin: 02/06/19 12:45 Dose:  Not Given


Vitamin B Complex/Vit C/Folic Acid (Nephro-Seda)  1 tab PO 0800 [facility]


   Last Admin: 02/06/19 12:46 Dose:  Not Given











Physical Exam





- Constitutional


Appears: No Acute Distress





- Head Exam


Head Exam: NORMOCEPHALIC





- Eye Exam


Eye Exam: Normal appearance.  absent: Scleral icterus





- ENT Exam


ENT Exam: Mucous Membranes Moist





- Neck Exam


Neck exam: Positive for: Normal Inspection





- Respiratory Exam


Respiratory Exam: Decreased Breath Sounds, NORMAL BREATHING PATTERN.  absent: 

Respiratory Distress





- Cardiovascular Exam


Cardiovascular Exam: +S1, +S2





- GI/Abdominal Exam


GI & Abdominal Exam: Distended, Normal Bowel Sounds, Soft.  absent: Guarding, 

Organomegaly, Rebound, Tenderness





- Extremities Exam


Extremities exam: Positive for: pedal edema, pedal pulses present





- Neurological Exam


Neurological exam: Alert, Oriented x3





- Skin


Skin Exam: Dry, Pallor, Warm





Results





- Vital Signs


Recent Vital Signs: 


                                Last Vital Signs











Temp  98.1 F   02/06/19 06:00


 


Pulse  85   02/06/19 12:40


 


Resp  12   02/05/19 18:00


 


BP  124/85   02/06/19 12:40


 


Pulse Ox  87 L  02/06/19 12:40














- Labs


Result Diagrams: 


                                 02/06/19 06:00





                                 02/06/19 06:00


Labs: 


                         Laboratory Results - last 24 hr











  02/05/19 02/05/19 02/05/19





  10:10 10:10 10:10


 


WBC   


 


RBC   


 


Hgb   


 


Hct   


 


MCV   


 


MCH   


 


MCHC   


 


RDW   


 


Plt Count   


 


MPV   


 


Neut % (Auto)   


 


Lymph % (Auto)   


 


Mono % (Auto)   


 


Eos % (Auto)   


 


Baso % (Auto)   


 


Lymph # (Auto)   


 


Mono # (Auto)   


 


Eos # (Auto)   


 


Baso # (Auto)   


 


Absolute Neuts (auto)   


 


PT   


 


INR   


 


APTT   


 


Sodium   


 


Potassium   


 


Chloride   


 


Carbon Dioxide   


 


Anion Gap   


 


BUN   


 


Creatinine   


 


Est GFR ( Amer)   


 


Est GFR (Non-Af Amer)   


 


Random Glucose   


 


Calcium   


 


Phosphorus   


 


Magnesium   


 


Transferrin  100.88 L  


 


Ferritin   489.0 


 


Total Bilirubin   


 


AST   


 


ALT   


 


Alkaline Phosphatase   


 


Troponin I   


 


Total Protein   


 


Albumin   


 


Globulin   


 


Albumin/Globulin Ratio   


 


Triglycerides   


 


Cholesterol   


 


LDL Cholesterol Direct   


 


HDL Cholesterol   


 


Vitamin B12   787 


 


25-OH Vitamin D Total   


 


TSH 3rd Generation   


 


Urine Eosinophils   


 


Ur Random Creatinine   


 


U Random Total Protein   


 


Urine Total Volume   


 


Microalb/Creat Ratio   


 


Urine Opiates Screen   


 


Urine Methadone Screen   


 


Ur Barbiturates Screen   


 


Ur Phencyclidine Scrn   


 


Ur Amphetamines Screen   


 


U Benzodiazepines Scrn   


 


U Oth Cocaine Metabols   


 


U Cannabinoids Screen   


 


GLENN Screen   


 


Complement C3  93.0  


 


Complement C4  36.5  


 


Hep Bs Antigen   Negative 


 


Hep Bs Antibody   


 


Hep B Core IgM Ab    Negative


 


Hepatitis C Antibody    Negative


 


HIV 1&2 Antibody Screen   


 


Blood Type   


 


Blood Type Confirm   


 


Antibody Screen   


 


Crossmatch   


 


BBK History Checked   














  02/05/19 02/05/19 02/05/19





  10:10 10:10 10:10


 


WBC   


 


RBC   


 


Hgb   


 


Hct   


 


MCV   


 


MCH   


 


MCHC   


 


RDW   


 


Plt Count   


 


MPV   


 


Neut % (Auto)   


 


Lymph % (Auto)   


 


Mono % (Auto)   


 


Eos % (Auto)   


 


Baso % (Auto)   


 


Lymph # (Auto)   


 


Mono # (Auto)   


 


Eos # (Auto)   


 


Baso # (Auto)   


 


Absolute Neuts (auto)   


 


PT   


 


INR   


 


APTT   


 


Sodium   


 


Potassium   


 


Chloride   


 


Carbon Dioxide   


 


Anion Gap   


 


BUN   


 


Creatinine   


 


Est GFR ( Amer)   


 


Est GFR (Non-Af Amer)   


 


Random Glucose   


 


Calcium   


 


Phosphorus   


 


Magnesium   


 


Transferrin   


 


Ferritin   


 


Total Bilirubin   


 


AST   


 


ALT   


 


Alkaline Phosphatase   


 


Troponin I   


 


Total Protein   


 


Albumin   


 


Globulin   


 


Albumin/Globulin Ratio   


 


Triglycerides   


 


Cholesterol   


 


LDL Cholesterol Direct   


 


HDL Cholesterol   


 


Vitamin B12   


 


25-OH Vitamin D Total   


 


TSH 3rd Generation   


 


Urine Eosinophils   


 


Ur Random Creatinine   


 


U Random Total Protein   


 


Urine Total Volume   


 


Microalb/Creat Ratio   


 


Urine Opiates Screen   


 


Urine Methadone Screen   


 


Ur Barbiturates Screen   


 


Ur Phencyclidine Scrn   


 


Ur Amphetamines Screen   


 


U Benzodiazepines Scrn   


 


U Oth Cocaine Metabols   


 


U Cannabinoids Screen   


 


GLENN Screen    Negative


 


Complement C3   


 


Complement C4   


 


Hep Bs Antigen   


 


Hep Bs Antibody  Negative  


 


Hep B Core IgM Ab   


 


Hepatitis C Antibody   


 


HIV 1&2 Antibody Screen   Negative 


 


Blood Type   


 


Blood Type Confirm   


 


Antibody Screen   


 


Crossmatch   


 


BBK History Checked   














  02/05/19 02/05/19 02/05/19





  11:00 11:00 15:10


 


WBC   


 


RBC   


 


Hgb   


 


Hct   


 


MCV   


 


MCH   


 


MCHC   


 


RDW   


 


Plt Count   


 


MPV   


 


Neut % (Auto)   


 


Lymph % (Auto)   


 


Mono % (Auto)   


 


Eos % (Auto)   


 


Baso % (Auto)   


 


Lymph # (Auto)   


 


Mono # (Auto)   


 


Eos # (Auto)   


 


Baso # (Auto)   


 


Absolute Neuts (auto)   


 


PT   


 


INR   


 


APTT   


 


Sodium   


 


Potassium   


 


Chloride   


 


Carbon Dioxide   


 


Anion Gap   


 


BUN   


 


Creatinine   


 


Est GFR ( Amer)   


 


Est GFR (Non-Af Amer)   


 


Random Glucose   


 


Calcium   


 


Phosphorus   


 


Magnesium   


 


Transferrin   


 


Ferritin   


 


Total Bilirubin   


 


AST   


 


ALT   


 


Alkaline Phosphatase   


 


Troponin I   


 


Total Protein   


 


Albumin   


 


Globulin   


 


Albumin/Globulin Ratio   


 


Triglycerides   


 


Cholesterol   


 


LDL Cholesterol Direct   


 


HDL Cholesterol   


 


Vitamin B12   


 


25-OH Vitamin D Total   


 


TSH 3rd Generation   


 


Urine Eosinophils   


 


Ur Random Creatinine   59 


 


U Random Total Protein  966 H  


 


Urine Total Volume   7.7 


 


Microalb/Creat Ratio   130 H 


 


Urine Opiates Screen    Negative


 


Urine Methadone Screen    Negative


 


Ur Barbiturates Screen    Negative


 


Ur Phencyclidine Scrn    Negative


 


Ur Amphetamines Screen    Negative


 


U Benzodiazepines Scrn    Negative


 


U Oth Cocaine Metabols    Negative


 


U Cannabinoids Screen    Negative


 


GLENN Screen   


 


Complement C3   


 


Complement C4   


 


Hep Bs Antigen   


 


Hep Bs Antibody   


 


Hep B Core IgM Ab   


 


Hepatitis C Antibody   


 


HIV 1&2 Antibody Screen   


 


Blood Type   


 


Blood Type Confirm   


 


Antibody Screen   


 


Crossmatch   


 


BBK History Checked   














  02/05/19 02/06/19 02/06/19





  15:10 06:00 06:00


 


WBC   8.1  D 


 


RBC   2.36 L 


 


Hgb   6.6 L* 


 


Hct   18.9 L* 


 


MCV   80.1 


 


MCH   28.0 


 


MCHC   34.9 


 


RDW   14.5 


 


Plt Count   192 


 


MPV   9.3 


 


Neut % (Auto)   86.6 H 


 


Lymph % (Auto)   10.3 L 


 


Mono % (Auto)   3.1 


 


Eos % (Auto)   0.0 L 


 


Baso % (Auto)   0.0 


 


Lymph # (Auto)   0.8 L 


 


Mono # (Auto)   0.3 


 


Eos # (Auto)   0.0 


 


Baso # (Auto)   0.00 


 


Absolute Neuts (auto)   7.04 H 


 


PT   


 


INR   


 


APTT   


 


Sodium    136


 


Potassium    3.1 L


 


Chloride    100


 


Carbon Dioxide    17 L


 


Anion Gap    22 H


 


BUN    181 H*


 


Creatinine    13.5 H*


 


Est GFR ( Amer)    4


 


Est GFR (Non-Af Amer)    4


 


Random Glucose    164 H


 


Calcium    6.5 L*


 


Phosphorus    9.4 H


 


Magnesium    1.8


 


Transferrin   


 


Ferritin   


 


Total Bilirubin    0.7


 


AST    50


 


ALT    36


 


Alkaline Phosphatase    91


 


Troponin I    2.23 H* D


 


Total Protein    6.1


 


Albumin    3.0


 


Globulin    3.1


 


Albumin/Globulin Ratio    1.0 L


 


Triglycerides    148


 


Cholesterol    99 L


 


LDL Cholesterol Direct    55


 


HDL Cholesterol    18 L


 


Vitamin B12   


 


25-OH Vitamin D Total   


 


TSH 3rd Generation   


 


Urine Eosinophils  Positive  


 


Ur Random Creatinine   


 


U Random Total Protein   


 


Urine Total Volume   


 


Microalb/Creat Ratio   


 


Urine Opiates Screen   


 


Urine Methadone Screen   


 


Ur Barbiturates Screen   


 


Ur Phencyclidine Scrn   


 


Ur Amphetamines Screen   


 


U Benzodiazepines Scrn   


 


U Oth Cocaine Metabols   


 


U Cannabinoids Screen   


 


GLENN Screen   


 


Complement C3   


 


Complement C4   


 


Hep Bs Antigen   


 


Hep Bs Antibody   


 


Hep B Core IgM Ab   


 


Hepatitis C Antibody   


 


HIV 1&2 Antibody Screen   


 


Blood Type   


 


Blood Type Confirm   


 


Antibody Screen   


 


Crossmatch   


 


BBK History Checked   














  02/06/19 02/06/19 02/06/19





  06:00 06:00 06:00


 


WBC   


 


RBC   


 


Hgb   


 


Hct   


 


MCV   


 


MCH   


 


MCHC   


 


RDW   


 


Plt Count   


 


MPV   


 


Neut % (Auto)   


 


Lymph % (Auto)   


 


Mono % (Auto)   


 


Eos % (Auto)   


 


Baso % (Auto)   


 


Lymph # (Auto)   


 


Mono # (Auto)   


 


Eos # (Auto)   


 


Baso # (Auto)   


 


Absolute Neuts (auto)   


 


PT    15.0 H


 


INR    1.33


 


APTT    33.8


 


Sodium   


 


Potassium   


 


Chloride   


 


Carbon Dioxide   


 


Anion Gap   


 


BUN   


 


Creatinine   


 


Est GFR ( Amer)   


 


Est GFR (Non-Af Amer)   


 


Random Glucose   


 


Calcium   


 


Phosphorus   


 


Magnesium   


 


Transferrin   


 


Ferritin   


 


Total Bilirubin   


 


AST   


 


ALT   


 


Alkaline Phosphatase   


 


Troponin I   


 


Total Protein   


 


Albumin   


 


Globulin   


 


Albumin/Globulin Ratio   


 


Triglycerides   


 


Cholesterol   


 


LDL Cholesterol Direct   


 


HDL Cholesterol   


 


Vitamin B12   


 


25-OH Vitamin D Total  < 12.8 L  


 


TSH 3rd Generation   3.04 


 


Urine Eosinophils   


 


Ur Random Creatinine   


 


U Random Total Protein   


 


Urine Total Volume   


 


Microalb/Creat Ratio   


 


Urine Opiates Screen   


 


Urine Methadone Screen   


 


Ur Barbiturates Screen   


 


Ur Phencyclidine Scrn   


 


Ur Amphetamines Screen   


 


U Benzodiazepines Scrn   


 


U Oth Cocaine Metabols   


 


U Cannabinoids Screen   


 


GLENN Screen   


 


Complement C3   


 


Complement C4   


 


Hep Bs Antigen   


 


Hep Bs Antibody   


 


Hep B Core IgM Ab   


 


Hepatitis C Antibody   


 


HIV 1&2 Antibody Screen   


 


Blood Type   


 


Blood Type Confirm   


 


Antibody Screen   


 


Crossmatch   


 


BBK History Checked   














  02/06/19 02/06/19





  09:00 09:43


 


WBC  


 


RBC  


 


Hgb  


 


Hct  


 


MCV  


 


MCH  


 


MCHC  


 


RDW  


 


Plt Count  


 


MPV  


 


Neut % (Auto)  


 


Lymph % (Auto)  


 


Mono % (Auto)  


 


Eos % (Auto)  


 


Baso % (Auto)  


 


Lymph # (Auto)  


 


Mono # (Auto)  


 


Eos # (Auto)  


 


Baso # (Auto)  


 


Absolute Neuts (auto)  


 


PT  


 


INR  


 


APTT  


 


Sodium  


 


Potassium  


 


Chloride  


 


Carbon Dioxide  


 


Anion Gap  


 


BUN  


 


Creatinine  


 


Est GFR ( Amer)  


 


Est GFR (Non-Af Amer)  


 


Random Glucose  


 


Calcium  


 


Phosphorus  


 


Magnesium  


 


Transferrin  


 


Ferritin  


 


Total Bilirubin  


 


AST  


 


ALT  


 


Alkaline Phosphatase  


 


Troponin I  


 


Total Protein  


 


Albumin  


 


Globulin  


 


Albumin/Globulin Ratio  


 


Triglycerides  


 


Cholesterol  


 


LDL Cholesterol Direct  


 


HDL Cholesterol  


 


Vitamin B12  


 


25-OH Vitamin D Total  


 


TSH 3rd Generation  


 


Urine Eosinophils  


 


Ur Random Creatinine  


 


U Random Total Protein  


 


Urine Total Volume  


 


Microalb/Creat Ratio  


 


Urine Opiates Screen  


 


Urine Methadone Screen  


 


Ur Barbiturates Screen  


 


Ur Phencyclidine Scrn  


 


Ur Amphetamines Screen  


 


U Benzodiazepines Scrn  


 


U Oth Cocaine Metabols  


 


U Cannabinoids Screen  


 


GLENN Screen  


 


Complement C3  


 


Complement C4  


 


Hep Bs Antigen  


 


Hep Bs Antibody  


 


Hep B Core IgM Ab  


 


Hepatitis C Antibody  


 


HIV 1&2 Antibody Screen  


 


Blood Type  A POSITIVE 


 


Blood Type Confirm   A POSITIVE


 


Antibody Screen  Negative 


 


Crossmatch  See Detail 


 


BBK History Checked  No verified bt 














Assessment & Plan





- Assessment and Plan (Free Text)


Assessment: 





ASSESSMENT:


Acute Renal Failure, maybe secondary to tubular necrosis. 


Severe Anemia, r/o GIB


SIRRS


NSTEMI, Elevated Troponins


H/O NSAID use( Chronic use of Aleve for years), r/o PUD


Obstructing 9mm renal stone w/ hydronephrosis


Osteoarthritis


Obesity





PLAN:





NPO for possible ureter stent


receiving blood transfusion, total 2unit PRBC


continue to monitor H/H and for overt GI bleeding


continue PPI, on Protonix IV daily


on IV antibiotics as per ID


Stool guiac


would benefit from EGD when optimal to R/O PUD


as per renal, urology,cardiology, and ICU team








Thank you for this consult and for allowing us to participate in your patient 

care.





Seen and discussed w/ Dr. Soria.

## 2019-02-06 NOTE — PROCN
DATE:  02/05/2019



PROCEDURE:  Hemodialysis catheter placement in the right internal jugular

vein.



INDICATION:  Emergent hemodialysis.



DESCRIPTION OF PROCEDURE:  After obtaining informed consent, the

operational area was sterilized.  Time-out was performed.  Maximum barrier

precautions used.  Right internal jugular was cannulated by sterile

Seldinger technique.  Guidewire removed.  Hemostasis achieved.  Sterile

dressings applied.  Chest x-ray confirmed correct position of the

hemodialysis catheter.  The patient tolerated the procedure well.  EBL

 minimal.





__________________________________________

Dakota Allen MD





DD:  02/05/2019 18:58:01

DT:  02/06/2019 1:03:18

Job # [id number]

TESHA

## 2019-02-06 NOTE — CP.[email or web address]
<Anastasia Tyler - Last Filed: 02/06/19 10:11>





CCU Subjective





- Physician Review


Subjective (Free Text): 


Anastasia Tyler, PGY-1 ICU Progress Note for Dr. Paulino





Patient seen and evaluated at bedside. No acute overnight events. Patient 

reports no chest pain and shortness of breath. Patient denies headache, fever, 

heart palpitations, left arm pain, jaw pain, nausea, vomiting, constipation, 

abdominal pain, dysuria, hematuria. 12-point ROS was negative except for what 

was mentioned above.











CCU Objective





- Vital Signs / Intake & Output


Vital Signs (Last 4 hours): 


Vital Signs











  Pulse BP Pulse Ox


 


 02/06/19 08:56   121/66 


 


 02/06/19 08:55  78   99


 


 02/06/19 08:19  83  114/60  99


 


 02/06/19 08:00  81  115/64  100


 


 02/06/19 07:00  84  118/64  98











Intake and Output (Last 8hrs): 


                                 Intake & Output











 02/05/19 02/06/19 02/06/19





 22:59 06:59 14:59


 


Intake Total 1640 840 


 


Output Total 850 1000 


 


Balance 790 -160 


 


Weight  241 lb 


 


Intake:   


 


  IV 1400 600 


 


    antibiotics 100 0 


 


    bicarb 1300 600 


 


  Oral 240 240 


 


Output:   


 


  Urine 350 500 


 


    Urethral (Jovel) 350 500 


 


  Stool 0 0 


 


  Emesis 0 0 


 


  Other 500 500 














- Physical Exam


Head: Positive for: Atraumatic, Normocephalic


Pupils: Positive for: PERRL


Extroacular Muscles: Positive for: EOMI


Conjunctiva: Positive for: Normal


Mouth: Positive for: Dry


Pharnyx: Positive for: Normal


Respiratory/Chest: Positive for: Tachypneic (but improved).  Negative for: Good 

Air Exchange, Respiratory Distress, Retracting


Abdomen: Positive for: Normal Bowel Sounds.  Negative for: Tenderness, Distenti

on, Peritoneal Signs


Lower Extremity: Positive for: Edema, Capillary Refill < 2 s, Other (Thickened 

skin noted to right lower extremity)


Neurological: Positive for: GCS=15, Speech Normal (able to speak in full 

sentences)


Skin: Positive for: Warm, Dry, Normal Color.  Negative for: Rashes


Psychiatric: Positive for: Alert, Oriented x 3, Normal Insight, Normal 

Concentration





- Medications


Active Medications: 


Active Medications











Generic Name Dose Route Start Last Admin





  Trade Name Freq  PRN Reason Stop Dose Admin


 


Albuterol/Ipratropium  3 ml  02/04/19 17:04  





  Duoneb 3 Mg/0.5 Mg (3 Ml) Ud  IH   





  Q2H PRN   





  Shortness of Breath   





     





     





     


 


Aspirin  81 mg  02/05/19 10:00  02/05/19 09:27





  Aspirin Chewable  PO   81 mg





  DAILY BRENT   Administration





     





     





     





     


 


Atorvastatin Calcium  40 mg  02/05/19 17:00  02/05/19 17:22





  Lipitor  PO   40 mg





  DIN BRENT   Administration





     





     





     





     


 


Calcium Acetate  2,001 mg  02/05/19 12:00  02/05/19 17:22





  Phoslo  PO   2,001 mg





  WM BRENT   Administration





     





     





     





     


 


Darbepoetin Balta  100 mcg  02/05/19 10:00  02/05/19 13:50





  Aranesp  IVP   100 mcg





  QWK BRENT   Administration





     





     





     





     


 


Cefepime HCl  1 gm in 100 mls @ 100 mls/hr  02/04/19 18:45  02/05/19 17:45





  Maxipime 1gm  IVPB   100 mls/hr





  Q24H BRENT   Administration





     





     





  Protocol   





     


 


Nystatin  0 ea  02/05/19 14:00  02/05/19 18:51





  Mycostatin Cream  TOP   1 applic





  TID BRENT   Administration





     





     





     





     


 


Pantoprazole Sodium  40 mg  02/05/19 10:00  02/06/19 09:32





  Protonix Inj  IVP   40 mg





  DAILY BRENT   Administration





     





     





     





     


 


Tamsulosin HCl  0.4 mg  02/05/19 12:30  02/05/19 17:23





  Flomax  PO   0.4 mg





  DAILY BRENT   Administration





     





     





     





     


 


Vitamin B Complex/Vit C/Folic Acid  1 tab  02/06/19 08:00  





  Nephro-Seda  PO   





  0800 BRENT   





     





     





     





     














- Patient Studies


Lab Studies: 


                              Microbiology Studies











 02/04/19 15:00 Blood Culture - Preliminary





 Blood-Venous    NO GROWTH AFTER 24 HOURS


 


 02/04/19 14:30 Blood Culture - Preliminary





 Blood-Venous    NO GROWTH AFTER 24 HOURS








                                   Lab Studies











  02/06/19 02/06/19 02/06/19 Range/Units





  09:00 06:00 06:00 


 


WBC     (4.5-11.0)  10^3/uL


 


RBC     (3.5-6.1)  10^6/uL


 


Hgb     (14.0-18.0)  g/dL


 


Hct     (42.0-52.0)  %


 


MCV     (80.0-105.0)  fl


 


MCH     (25.0-35.0)  pg


 


MCHC     (31.0-37.0)  g/dl


 


RDW     (11.5-14.5)  %


 


Plt Count     (120.0-450.0)  10^3/uL


 


MPV     (7.0-11.0)  fl


 


Neut % (Auto)     (50.0-68.0)  %


 


Lymph % (Auto)     (22.0-35.0)  %


 


Mono % (Auto)     (1.0-6.0)  %


 


Eos % (Auto)     (1.5-5.0)  %


 


Baso % (Auto)     (0.0-3.0)  %


 


Lymph # (Auto)     (1.2-3.4)  


 


Mono # (Auto)     (0.1-0.6)  


 


Eos # (Auto)     (0.0-0.7)  


 


Baso # (Auto)     (0.0-2.0)  K/mm3


 


Absolute Neuts (auto)     (1.4-6.5)  


 


PT   15.0 H   (9.4-12.5)  SECONDS


 


INR   1.33   


 


APTT   33.8   (26.9-38.3)  Seconds


 


Sodium     (132-148)  mmol/L


 


Potassium     (3.6-5.0)  mmol/L


 


Chloride     ()  mmol/L


 


Carbon Dioxide     (21-33)  mmol/L


 


Anion Gap     (10-20)  


 


BUN     (7-21)  mg/dL


 


Creatinine     (0.8-1.5)  mg/dl


 


Est GFR (African Amer)     


 


Est GFR (Non-Af Amer)     


 


Random Glucose     ()  mg/dL


 


Calcium     (8.4-10.5)  mg/dL


 


Phosphorus     (2.5-4.5)  mg/dL


 


Magnesium     (1.7-2.2)  mg/dL


 


Iron     ()  ug/dL


 


TIBC     (261-462)  ug/dL


 


% Saturation     (20-55)  %


 


Transferrin     (206-381)  mg/dL


 


Ferritin     ng/mL


 


Total Bilirubin     (0.2-1.3)  mg/dL


 


AST     (17-59)  U/L


 


ALT     (7-56)  U/L


 


Alkaline Phosphatase     ()  U/L


 


Troponin I     ng/mL


 


Total Protein     (5.8-8.3)  g/dL


 


Albumin     (3.0-4.8)  g/dL


 


Globulin     gm/dL


 


Albumin/Globulin Ratio     (1.1-1.8)  


 


Triglycerides     ()  mg/dL


 


Cholesterol     (130-200)  mg/dL


 


LDL Cholesterol Direct     (0-129)  mg/dL


 


HDL Cholesterol     (29-60)  mg/dL


 


Vitamin B12     (239-931)  pg/mL


 


TSH 3rd Generation    3.04  (0.46-4.68)  mIU/mL


 


Urine Eosinophils     


 


Ur Random Creatinine     ()  mg/dL


 


U Random Total Protein     ()  mg/g creat


 


Ur Random Sodium     meq/L


 


Urine Total Volume     mg/dL


 


Microalb/Creat Ratio     (<30)   


 


Urine Opiates Screen     (NEGATIVE)  


 


Urine Methadone Screen     (NEGATIVE)  


 


Ur Barbiturates Screen     (NEGATIVE)  


 


Ur Phencyclidine Scrn     (NEGATIVE)  


 


Ur Amphetamines Screen     (NEGATIVE)  


 


U Benzodiazepines Scrn     (NEGATIVE)  


 


U Oth Cocaine Metabols     (NEGATIVE)  


 


U Cannabinoids Screen     (NEGATIVE)  


 


Complement C3     (88.0-165.0)  mg/dL


 


Complement C4     (14.0-44.0)  mg/dL


 


Hep Bs Antigen     (NEGATIVE)  


 


Hep Bs Antibody     (NEGATIVE)  


 


Hep B Core IgM Ab     (NEGATIVE)  


 


Hepatitis C Antibody     (NEGATIVE)  


 


HIV 1&2 Antibody Screen     (NEGATIVE)  


 


Crossmatch  See Detail    


 


BBK History Checked  No verified bt    














  02/06/19 02/06/19 02/05/19 Range/Units





  06:00 06:00 15:10 


 


WBC   8.1  D   (4.5-11.0)  10^3/uL


 


RBC   2.36 L   (3.5-6.1)  10^6/uL


 


Hgb   6.6 L*   (14.0-18.0)  g/dL


 


Hct   18.9 L*   (42.0-52.0)  %


 


MCV   80.1   (80.0-105.0)  fl


 


MCH   28.0   (25.0-35.0)  pg


 


MCHC   34.9   (31.0-37.0)  g/dl


 


RDW   14.5   (11.5-14.5)  %


 


Plt Count   192   (120.0-450.0)  10^3/uL


 


MPV   9.3   (7.0-11.0)  fl


 


Neut % (Auto)   86.6 H   (50.0-68.0)  %


 


Lymph % (Auto)   10.3 L   (22.0-35.0)  %


 


Mono % (Auto)   3.1   (1.0-6.0)  %


 


Eos % (Auto)   0.0 L   (1.5-5.0)  %


 


Baso % (Auto)   0.0   (0.0-3.0)  %


 


Lymph # (Auto)   0.8 L   (1.2-3.4)  


 


Mono # (Auto)   0.3   (0.1-0.6)  


 


Eos # (Auto)   0.0   (0.0-0.7)  


 


Baso # (Auto)   0.00   (0.0-2.0)  K/mm3


 


Absolute Neuts (auto)   7.04 H   (1.4-6.5)  


 


PT     (9.4-12.5)  SECONDS


 


INR     


 


APTT     (26.9-38.3)  Seconds


 


Sodium  136    (132-148)  mmol/L


 


Potassium  3.1 L    (3.6-5.0)  mmol/L


 


Chloride  100    ()  mmol/L


 


Carbon Dioxide  17 L    (21-33)  mmol/L


 


Anion Gap  22 H    (10-20)  


 


BUN  181 H*    (7-21)  mg/dL


 


Creatinine  13.5 H*    (0.8-1.5)  mg/dl


 


Est GFR (African Amer)  4    


 


Est GFR (Non-Af Amer)  4    


 


Random Glucose  164 H    ()  mg/dL


 


Calcium  6.5 L*    (8.4-10.5)  mg/dL


 


Phosphorus  9.4 H    (2.5-4.5)  mg/dL


 


Magnesium  1.8    (1.7-2.2)  mg/dL


 


Iron     ()  ug/dL


 


TIBC     (261-462)  ug/dL


 


% Saturation     (20-55)  %


 


Transferrin     (206-381)  mg/dL


 


Ferritin     ng/mL


 


Total Bilirubin  0.7    (0.2-1.3)  mg/dL


 


AST  50    (17-59)  U/L


 


ALT  36    (7-56)  U/L


 


Alkaline Phosphatase  91    ()  U/L


 


Troponin I  2.23 H* D    ng/mL


 


Total Protein  6.1    (5.8-8.3)  g/dL


 


Albumin  3.0    (3.0-4.8)  g/dL


 


Globulin  3.1    gm/dL


 


Albumin/Globulin Ratio  1.0 L    (1.1-1.8)  


 


Triglycerides  148    ()  mg/dL


 


Cholesterol  99 L    (130-200)  mg/dL


 


LDL Cholesterol Direct  55    (0-129)  mg/dL


 


HDL Cholesterol  18 L    (29-60)  mg/dL


 


Vitamin B12     (239-931)  pg/mL


 


TSH 3rd Generation     (0.46-4.68)  mIU/mL


 


Urine Eosinophils    Positive  


 


Ur Random Creatinine     ()  mg/dL


 


U Random Total Protein     ()  mg/g creat


 


Ur Random Sodium     meq/L


 


Urine Total Volume     mg/dL


 


Microalb/Creat Ratio     (<30)   


 


Urine Opiates Screen     (NEGATIVE)  


 


Urine Methadone Screen     (NEGATIVE)  


 


Ur Barbiturates Screen     (NEGATIVE)  


 


Ur Phencyclidine Scrn     (NEGATIVE)  


 


Ur Amphetamines Screen     (NEGATIVE)  


 


U Benzodiazepines Scrn     (NEGATIVE)  


 


U Oth Cocaine Metabols     (NEGATIVE)  


 


U Cannabinoids Screen     (NEGATIVE)  


 


Complement C3     (88.0-165.0)  mg/dL


 


Complement C4     (14.0-44.0)  mg/dL


 


Hep Bs Antigen     (NEGATIVE)  


 


Hep Bs Antibody     (NEGATIVE)  


 


Hep B Core IgM Ab     (NEGATIVE)  


 


Hepatitis C Antibody     (NEGATIVE)  


 


HIV 1&2 Antibody Screen     (NEGATIVE)  


 


Crossmatch     


 


BBK History Checked     














  02/05/19 02/05/19 02/05/19 Range/Units





  15:10 11:00 11:00 


 


WBC     (4.5-11.0)  10^3/uL


 


RBC     (3.5-6.1)  10^6/uL


 


Hgb     (14.0-18.0)  g/dL


 


Hct     (42.0-52.0)  %


 


MCV     (80.0-105.0)  fl


 


MCH     (25.0-35.0)  pg


 


MCHC     (31.0-37.0)  g/dl


 


RDW     (11.5-14.5)  %


 


Plt Count     (120.0-450.0)  10^3/uL


 


MPV     (7.0-11.0)  fl


 


Neut % (Auto)     (50.0-68.0)  %


 


Lymph % (Auto)     (22.0-35.0)  %


 


Mono % (Auto)     (1.0-6.0)  %


 


Eos % (Auto)     (1.5-5.0)  %


 


Baso % (Auto)     (0.0-3.0)  %


 


Lymph # (Auto)     (1.2-3.4)  


 


Mono # (Auto)     (0.1-0.6)  


 


Eos # (Auto)     (0.0-0.7)  


 


Baso # (Auto)     (0.0-2.0)  K/mm3


 


Absolute Neuts (auto)     (1.4-6.5)  


 


PT     (9.4-12.5)  SECONDS


 


INR     


 


APTT     (26.9-38.3)  Seconds


 


Sodium     (132-148)  mmol/L


 


Potassium     (3.6-5.0)  mmol/L


 


Chloride     ()  mmol/L


 


Carbon Dioxide     (21-33)  mmol/L


 


Anion Gap     (10-20)  


 


BUN     (7-21)  mg/dL


 


Creatinine     (0.8-1.5)  mg/dl


 


Est GFR (African Amer)     


 


Est GFR (Non-Af Amer)     


 


Random Glucose     ()  mg/dL


 


Calcium     (8.4-10.5)  mg/dL


 


Phosphorus     (2.5-4.5)  mg/dL


 


Magnesium     (1.7-2.2)  mg/dL


 


Iron     ()  ug/dL


 


TIBC     (261-462)  ug/dL


 


% Saturation     (20-55)  %


 


Transferrin     (206-381)  mg/dL


 


Ferritin     ng/mL


 


Total Bilirubin     (0.2-1.3)  mg/dL


 


AST     (17-59)  U/L


 


ALT     (7-56)  U/L


 


Alkaline Phosphatase     ()  U/L


 


Troponin I     ng/mL


 


Total Protein     (5.8-8.3)  g/dL


 


Albumin     (3.0-4.8)  g/dL


 


Globulin     gm/dL


 


Albumin/Globulin Ratio     (1.1-1.8)  


 


Triglycerides     ()  mg/dL


 


Cholesterol     (130-200)  mg/dL


 


LDL Cholesterol Direct     (0-129)  mg/dL


 


HDL Cholesterol     (29-60)  mg/dL


 


Vitamin B12     (239-931)  pg/mL


 


TSH 3rd Generation     (0.46-4.68)  mIU/mL


 


Urine Eosinophils     


 


Ur Random Creatinine   54  59  ()  mg/dL


 


U Random Total Protein     ()  mg/g creat


 


Ur Random Sodium   96   meq/L


 


Urine Total Volume    7.7  mg/dL


 


Microalb/Creat Ratio    130 H  (<30)   


 


Urine Opiates Screen  Negative    (NEGATIVE)  


 


Urine Methadone Screen  Negative    (NEGATIVE)  


 


Ur Barbiturates Screen  Negative    (NEGATIVE)  


 


Ur Phencyclidine Scrn  Negative    (NEGATIVE)  


 


Ur Amphetamines Screen  Negative    (NEGATIVE)  


 


U Benzodiazepines Scrn  Negative    (NEGATIVE)  


 


U Oth Cocaine Metabols  Negative    (NEGATIVE)  


 


U Cannabinoids Screen  Negative    (NEGATIVE)  


 


Complement C3     (88.0-165.0)  mg/dL


 


Complement C4     (14.0-44.0)  mg/dL


 


Hep Bs Antigen     (NEGATIVE)  


 


Hep Bs Antibody     (NEGATIVE)  


 


Hep B Core IgM Ab     (NEGATIVE)  


 


Hepatitis C Antibody     (NEGATIVE)  


 


HIV 1&2 Antibody Screen     (NEGATIVE)  


 


Crossmatch     


 


BBK History Checked     














  02/05/19 02/05/19 02/05/19 Range/Units





  11:00 10:10 10:10 


 


WBC     (4.5-11.0)  10^3/uL


 


RBC     (3.5-6.1)  10^6/uL


 


Hgb     (14.0-18.0)  g/dL


 


Hct     (42.0-52.0)  %


 


MCV     (80.0-105.0)  fl


 


MCH     (25.0-35.0)  pg


 


MCHC     (31.0-37.0)  g/dl


 


RDW     (11.5-14.5)  %


 


Plt Count     (120.0-450.0)  10^3/uL


 


MPV     (7.0-11.0)  fl


 


Neut % (Auto)     (50.0-68.0)  %


 


Lymph % (Auto)     (22.0-35.0)  %


 


Mono % (Auto)     (1.0-6.0)  %


 


Eos % (Auto)     (1.5-5.0)  %


 


Baso % (Auto)     (0.0-3.0)  %


 


Lymph # (Auto)     (1.2-3.4)  


 


Mono # (Auto)     (0.1-0.6)  


 


Eos # (Auto)     (0.0-0.7)  


 


Baso # (Auto)     (0.0-2.0)  K/mm3


 


Absolute Neuts (auto)     (1.4-6.5)  


 


PT     (9.4-12.5)  SECONDS


 


INR     


 


APTT     (26.9-38.3)  Seconds


 


Sodium     (132-148)  mmol/L


 


Potassium     (3.6-5.0)  mmol/L


 


Chloride     ()  mmol/L


 


Carbon Dioxide     (21-33)  mmol/L


 


Anion Gap     (10-20)  


 


BUN     (7-21)  mg/dL


 


Creatinine     (0.8-1.5)  mg/dl


 


Est GFR (African Amer)     


 


Est GFR (Non-Af Amer)     


 


Random Glucose     ()  mg/dL


 


Calcium     (8.4-10.5)  mg/dL


 


Phosphorus     (2.5-4.5)  mg/dL


 


Magnesium     (1.7-2.2)  mg/dL


 


Iron     ()  ug/dL


 


TIBC     (261-462)  ug/dL


 


% Saturation     (20-55)  %


 


Transferrin     (206-381)  mg/dL


 


Ferritin     ng/mL


 


Total Bilirubin     (0.2-1.3)  mg/dL


 


AST     (17-59)  U/L


 


ALT     (7-56)  U/L


 


Alkaline Phosphatase     ()  U/L


 


Troponin I     ng/mL


 


Total Protein     (5.8-8.3)  g/dL


 


Albumin     (3.0-4.8)  g/dL


 


Globulin     gm/dL


 


Albumin/Globulin Ratio     (1.1-1.8)  


 


Triglycerides     ()  mg/dL


 


Cholesterol     (130-200)  mg/dL


 


LDL Cholesterol Direct     (0-129)  mg/dL


 


HDL Cholesterol     (29-60)  mg/dL


 


Vitamin B12     (239-931)  pg/mL


 


TSH 3rd Generation     (0.46-4.68)  mIU/mL


 


Urine Eosinophils     


 


Ur Random Creatinine     ()  mg/dL


 


U Random Total Protein  966 H    ()  mg/g creat


 


Ur Random Sodium     meq/L


 


Urine Total Volume     mg/dL


 


Microalb/Creat Ratio     (<30)   


 


Urine Opiates Screen     (NEGATIVE)  


 


Urine Methadone Screen     (NEGATIVE)  


 


Ur Barbiturates Screen     (NEGATIVE)  


 


Ur Phencyclidine Scrn     (NEGATIVE)  


 


Ur Amphetamines Screen     (NEGATIVE)  


 


U Benzodiazepines Scrn     (NEGATIVE)  


 


U Oth Cocaine Metabols     (NEGATIVE)  


 


U Cannabinoids Screen     (NEGATIVE)  


 


Complement C3     (88.0-165.0)  mg/dL


 


Complement C4     (14.0-44.0)  mg/dL


 


Hep Bs Antigen     (NEGATIVE)  


 


Hep Bs Antibody    Negative  (NEGATIVE)  


 


Hep B Core IgM Ab     (NEGATIVE)  


 


Hepatitis C Antibody     (NEGATIVE)  


 


HIV 1&2 Antibody Screen   Negative   (NEGATIVE)  


 


Crossmatch     


 


BBK History Checked     














  02/05/19 02/05/19 02/05/19 Range/Units





  10:10 10:10 10:10 


 


WBC     (4.5-11.0)  10^3/uL


 


RBC     (3.5-6.1)  10^6/uL


 


Hgb     (14.0-18.0)  g/dL


 


Hct     (42.0-52.0)  %


 


MCV     (80.0-105.0)  fl


 


MCH     (25.0-35.0)  pg


 


MCHC     (31.0-37.0)  g/dl


 


RDW     (11.5-14.5)  %


 


Plt Count     (120.0-450.0)  10^3/uL


 


MPV     (7.0-11.0)  fl


 


Neut % (Auto)     (50.0-68.0)  %


 


Lymph % (Auto)     (22.0-35.0)  %


 


Mono % (Auto)     (1.0-6.0)  %


 


Eos % (Auto)     (1.5-5.0)  %


 


Baso % (Auto)     (0.0-3.0)  %


 


Lymph # (Auto)     (1.2-3.4)  


 


Mono # (Auto)     (0.1-0.6)  


 


Eos # (Auto)     (0.0-0.7)  


 


Baso # (Auto)     (0.0-2.0)  K/mm3


 


Absolute Neuts (auto)     (1.4-6.5)  


 


PT     (9.4-12.5)  SECONDS


 


INR     


 


APTT     (26.9-38.3)  Seconds


 


Sodium     (132-148)  mmol/L


 


Potassium     (3.6-5.0)  mmol/L


 


Chloride     ()  mmol/L


 


Carbon Dioxide     (21-33)  mmol/L


 


Anion Gap     (10-20)  


 


BUN     (7-21)  mg/dL


 


Creatinine     (0.8-1.5)  mg/dl


 


Est GFR (African Amer)     


 


Est GFR (Non-Af Amer)     


 


Random Glucose     ()  mg/dL


 


Calcium     (8.4-10.5)  mg/dL


 


Phosphorus     (2.5-4.5)  mg/dL


 


Magnesium     (1.7-2.2)  mg/dL


 


Iron     ()  ug/dL


 


TIBC     (261-462)  ug/dL


 


% Saturation     (20-55)  %


 


Transferrin    100.88 L  (206-381)  mg/dL


 


Ferritin   489.0   ng/mL


 


Total Bilirubin     (0.2-1.3)  mg/dL


 


AST     (17-59)  U/L


 


ALT     (7-56)  U/L


 


Alkaline Phosphatase     ()  U/L


 


Troponin I     ng/mL


 


Total Protein     (5.8-8.3)  g/dL


 


Albumin     (3.0-4.8)  g/dL


 


Globulin     gm/dL


 


Albumin/Globulin Ratio     (1.1-1.8)  


 


Triglycerides     ()  mg/dL


 


Cholesterol     (130-200)  mg/dL


 


LDL Cholesterol Direct     (0-129)  mg/dL


 


HDL Cholesterol     (29-60)  mg/dL


 


Vitamin B12   787   (239-931)  pg/mL


 


TSH 3rd Generation     (0.46-4.68)  mIU/mL


 


Urine Eosinophils     


 


Ur Random Creatinine     ()  mg/dL


 


U Random Total Protein     ()  mg/g creat


 


Ur Random Sodium     meq/L


 


Urine Total Volume     mg/dL


 


Microalb/Creat Ratio     (<30)   


 


Urine Opiates Screen     (NEGATIVE)  


 


Urine Methadone Screen     (NEGATIVE)  


 


Ur Barbiturates Screen     (NEGATIVE)  


 


Ur Phencyclidine Scrn     (NEGATIVE)  


 


Ur Amphetamines Screen     (NEGATIVE)  


 


U Benzodiazepines Scrn     (NEGATIVE)  


 


U Oth Cocaine Metabols     (NEGATIVE)  


 


U Cannabinoids Screen     (NEGATIVE)  


 


Complement C3    93.0  (88.0-165.0)  mg/dL


 


Complement C4    36.5  (14.0-44.0)  mg/dL


 


Hep Bs Antigen   Negative   (NEGATIVE)  


 


Hep Bs Antibody     (NEGATIVE)  


 


Hep B Core IgM Ab  Negative    (NEGATIVE)  


 


Hepatitis C Antibody  Negative    (NEGATIVE)  


 


HIV 1&2 Antibody Screen     (NEGATIVE)  


 


Crossmatch     


 


BBK History Checked     














  02/05/19 Range/Units





  10:10 


 


WBC   (4.5-11.0)  10^3/uL


 


RBC   (3.5-6.1)  10^6/uL


 


Hgb   (14.0-18.0)  g/dL


 


Hct   (42.0-52.0)  %


 


MCV   (80.0-105.0)  fl


 


MCH   (25.0-35.0)  pg


 


MCHC   (31.0-37.0)  g/dl


 


RDW   (11.5-14.5)  %


 


Plt Count   (120.0-450.0)  10^3/uL


 


MPV   (7.0-11.0)  fl


 


Neut % (Auto)   (50.0-68.0)  %


 


Lymph % (Auto)   (22.0-35.0)  %


 


Mono % (Auto)   (1.0-6.0)  %


 


Eos % (Auto)   (1.5-5.0)  %


 


Baso % (Auto)   (0.0-3.0)  %


 


Lymph # (Auto)   (1.2-3.4)  


 


Mono # (Auto)   (0.1-0.6)  


 


Eos # (Auto)   (0.0-0.7)  


 


Baso # (Auto)   (0.0-2.0)  K/mm3


 


Absolute Neuts (auto)   (1.4-6.5)  


 


PT   (9.4-12.5)  SECONDS


 


INR   


 


APTT   (26.9-38.3)  Seconds


 


Sodium   (132-148)  mmol/L


 


Potassium   (3.6-5.0)  mmol/L


 


Chloride   ()  mmol/L


 


Carbon Dioxide   (21-33)  mmol/L


 


Anion Gap   (10-20)  


 


BUN   (7-21)  mg/dL


 


Creatinine   (0.8-1.5)  mg/dl


 


Est GFR (African Amer)   


 


Est GFR (Non-Af Amer)   


 


Random Glucose   ()  mg/dL


 


Calcium   (8.4-10.5)  mg/dL


 


Phosphorus   (2.5-4.5)  mg/dL


 


Magnesium   (1.7-2.2)  mg/dL


 


Iron  137  ()  ug/dL


 


TIBC  146 L  (261-462)  ug/dL


 


% Saturation  93 H  (20-55)  %


 


Transferrin   (206-381)  mg/dL


 


Ferritin   ng/mL


 


Total Bilirubin   (0.2-1.3)  mg/dL


 


AST   (17-59)  U/L


 


ALT   (7-56)  U/L


 


Alkaline Phosphatase   ()  U/L


 


Troponin I   ng/mL


 


Total Protein   (5.8-8.3)  g/dL


 


Albumin   (3.0-4.8)  g/dL


 


Globulin   gm/dL


 


Albumin/Globulin Ratio   (1.1-1.8)  


 


Triglycerides   ()  mg/dL


 


Cholesterol   (130-200)  mg/dL


 


LDL Cholesterol Direct   (0-129)  mg/dL


 


HDL Cholesterol   (29-60)  mg/dL


 


Vitamin B12   (239-931)  pg/mL


 


TSH 3rd Generation   (0.46-4.68)  mIU/mL


 


Urine Eosinophils   


 


Ur Random Creatinine   ()  mg/dL


 


U Random Total Protein   ()  mg/g creat


 


Ur Random Sodium   meq/L


 


Urine Total Volume   mg/dL


 


Microalb/Creat Ratio   (<30)   


 


Urine Opiates Screen   (NEGATIVE)  


 


Urine Methadone Screen   (NEGATIVE)  


 


Ur Barbiturates Screen   (NEGATIVE)  


 


Ur Phencyclidine Scrn   (NEGATIVE)  


 


Ur Amphetamines Screen   (NEGATIVE)  


 


U Benzodiazepines Scrn   (NEGATIVE)  


 


U Oth Cocaine Metabols   (NEGATIVE)  


 


U Cannabinoids Screen   (NEGATIVE)  


 


Complement C3   (88.0-165.0)  mg/dL


 


Complement C4   (14.0-44.0)  mg/dL


 


Hep Bs Antigen   (NEGATIVE)  


 


Hep Bs Antibody   (NEGATIVE)  


 


Hep B Core IgM Ab   (NEGATIVE)  


 


Hepatitis C Antibody   (NEGATIVE)  


 


HIV 1&2 Antibody Screen   (NEGATIVE)  


 


Crossmatch   


 


BBK History Checked   








                         Laboratory Results - last 24 hr











  02/05/19 02/05/19 02/05/19





  10:10 10:10 10:10


 


WBC   


 


RBC   


 


Hgb   


 


Hct   


 


MCV   


 


MCH   


 


MCHC   


 


RDW   


 


Plt Count   


 


MPV   


 


Neut % (Auto)   


 


Lymph % (Auto)   


 


Mono % (Auto)   


 


Eos % (Auto)   


 


Baso % (Auto)   


 


Lymph # (Auto)   


 


Mono # (Auto)   


 


Eos # (Auto)   


 


Baso # (Auto)   


 


Absolute Neuts (auto)   


 


PT   


 


INR   


 


APTT   


 


Sodium   


 


Potassium   


 


Chloride   


 


Carbon Dioxide   


 


Anion Gap   


 


BUN   


 


Creatinine   


 


Est GFR ( Amer)   


 


Est GFR (Non-Af Amer)   


 


Random Glucose   


 


Calcium   


 


Phosphorus   


 


Magnesium   


 


Iron  137  


 


TIBC  146 L  


 


% Saturation  93 H  


 


Transferrin   100.88 L 


 


Ferritin    489.0


 


Total Bilirubin   


 


AST   


 


ALT   


 


Alkaline Phosphatase   


 


Troponin I   


 


Total Protein   


 


Albumin   


 


Globulin   


 


Albumin/Globulin Ratio   


 


Triglycerides   


 


Cholesterol   


 


LDL Cholesterol Direct   


 


HDL Cholesterol   


 


Vitamin B12    787


 


TSH 3rd Generation   


 


Urine Eosinophils   


 


Ur Random Creatinine   


 


U Random Total Protein   


 


Ur Random Sodium   


 


Urine Total Volume   


 


Microalb/Creat Ratio   


 


Urine Opiates Screen   


 


Urine Methadone Screen   


 


Ur Barbiturates Screen   


 


Ur Phencyclidine Scrn   


 


Ur Amphetamines Screen   


 


U Benzodiazepines Scrn   


 


U Oth Cocaine Metabols   


 


U Cannabinoids Screen   


 


Complement C3   93.0 


 


Complement C4   36.5 


 


Hep Bs Antigen    Negative


 


Hep Bs Antibody   


 


Hep B Core IgM Ab   


 


Hepatitis C Antibody   


 


HIV 1&2 Antibody Screen   


 


Crossmatch   


 


BBK History Checked   














  02/05/19 02/05/19 02/05/19





  10:10 10:10 10:10


 


WBC   


 


RBC   


 


Hgb   


 


Hct   


 


MCV   


 


MCH   


 


MCHC   


 


RDW   


 


Plt Count   


 


MPV   


 


Neut % (Auto)   


 


Lymph % (Auto)   


 


Mono % (Auto)   


 


Eos % (Auto)   


 


Baso % (Auto)   


 


Lymph # (Auto)   


 


Mono # (Auto)   


 


Eos # (Auto)   


 


Baso # (Auto)   


 


Absolute Neuts (auto)   


 


PT   


 


INR   


 


APTT   


 


Sodium   


 


Potassium   


 


Chloride   


 


Carbon Dioxide   


 


Anion Gap   


 


BUN   


 


Creatinine   


 


Est GFR ( Amer)   


 


Est GFR (Non-Af Amer)   


 


Random Glucose   


 


Calcium   


 


Phosphorus   


 


Magnesium   


 


Iron   


 


TIBC   


 


% Saturation   


 


Transferrin   


 


Ferritin   


 


Total Bilirubin   


 


AST   


 


ALT   


 


Alkaline Phosphatase   


 


Troponin I   


 


Total Protein   


 


Albumin   


 


Globulin   


 


Albumin/Globulin Ratio   


 


Triglycerides   


 


Cholesterol   


 


LDL Cholesterol Direct   


 


HDL Cholesterol   


 


Vitamin B12   


 


TSH 3rd Generation   


 


Urine Eosinophils   


 


Ur Random Creatinine   


 


U Random Total Protein   


 


Ur Random Sodium   


 


Urine Total Volume   


 


Microalb/Creat Ratio   


 


Urine Opiates Screen   


 


Urine Methadone Screen   


 


Ur Barbiturates Screen   


 


Ur Phencyclidine Scrn   


 


Ur Amphetamines Screen   


 


U Benzodiazepines Scrn   


 


U Oth Cocaine Metabols   


 


U Cannabinoids Screen   


 


Complement C3   


 


Complement C4   


 


Hep Bs Antigen   


 


Hep Bs Antibody   Negative 


 


Hep B Core IgM Ab  Negative  


 


Hepatitis C Antibody  Negative  


 


HIV 1&2 Antibody Screen    Negative


 


Crossmatch   


 


BBK History Checked   














  02/05/19 02/05/19 02/05/19





  11:00 11:00 11:00


 


WBC   


 


RBC   


 


Hgb   


 


Hct   


 


MCV   


 


MCH   


 


MCHC   


 


RDW   


 


Plt Count   


 


MPV   


 


Neut % (Auto)   


 


Lymph % (Auto)   


 


Mono % (Auto)   


 


Eos % (Auto)   


 


Baso % (Auto)   


 


Lymph # (Auto)   


 


Mono # (Auto)   


 


Eos # (Auto)   


 


Baso # (Auto)   


 


Absolute Neuts (auto)   


 


PT   


 


INR   


 


APTT   


 


Sodium   


 


Potassium   


 


Chloride   


 


Carbon Dioxide   


 


Anion Gap   


 


BUN   


 


Creatinine   


 


Est GFR ( Amer)   


 


Est GFR (Non-Af Amer)   


 


Random Glucose   


 


Calcium   


 


Phosphorus   


 


Magnesium   


 


Iron   


 


TIBC   


 


% Saturation   


 


Transferrin   


 


Ferritin   


 


Total Bilirubin   


 


AST   


 


ALT   


 


Alkaline Phosphatase   


 


Troponin I   


 


Total Protein   


 


Albumin   


 


Globulin   


 


Albumin/Globulin Ratio   


 


Triglycerides   


 


Cholesterol   


 


LDL Cholesterol Direct   


 


HDL Cholesterol   


 


Vitamin B12   


 


TSH 3rd Generation   


 


Urine Eosinophils   


 


Ur Random Creatinine   59  54


 


U Random Total Protein  966 H  


 


Ur Random Sodium    96


 


Urine Total Volume   7.7 


 


Microalb/Creat Ratio   130 H 


 


Urine Opiates Screen   


 


Urine Methadone Screen   


 


Ur Barbiturates Screen   


 


Ur Phencyclidine Scrn   


 


Ur Amphetamines Screen   


 


U Benzodiazepines Scrn   


 


U Oth Cocaine Metabols   


 


U Cannabinoids Screen   


 


Complement C3   


 


Complement C4   


 


Hep Bs Antigen   


 


Hep Bs Antibody   


 


Hep B Core IgM Ab   


 


Hepatitis C Antibody   


 


HIV 1&2 Antibody Screen   


 


Crossmatch   


 


BBK History Checked   














  02/05/19 02/05/19 02/06/19





  15:10 15:10 06:00


 


WBC    8.1  D


 


RBC    2.36 L


 


Hgb    6.6 L*


 


Hct    18.9 L*


 


MCV    80.1


 


MCH    28.0


 


MCHC    34.9


 


RDW    14.5


 


Plt Count    192


 


MPV    9.3


 


Neut % (Auto)    86.6 H


 


Lymph % (Auto)    10.3 L


 


Mono % (Auto)    3.1


 


Eos % (Auto)    0.0 L


 


Baso % (Auto)    0.0


 


Lymph # (Auto)    0.8 L


 


Mono # (Auto)    0.3


 


Eos # (Auto)    0.0


 


Baso # (Auto)    0.00


 


Absolute Neuts (auto)    7.04 H


 


PT   


 


INR   


 


APTT   


 


Sodium   


 


Potassium   


 


Chloride   


 


Carbon Dioxide   


 


Anion Gap   


 


BUN   


 


Creatinine   


 


Est GFR ( Amer)   


 


Est GFR (Non-Af Amer)   


 


Random Glucose   


 


Calcium   


 


Phosphorus   


 


Magnesium   


 


Iron   


 


TIBC   


 


% Saturation   


 


Transferrin   


 


Ferritin   


 


Total Bilirubin   


 


AST   


 


ALT   


 


Alkaline Phosphatase   


 


Troponin I   


 


Total Protein   


 


Albumin   


 


Globulin   


 


Albumin/Globulin Ratio   


 


Triglycerides   


 


Cholesterol   


 


LDL Cholesterol Direct   


 


HDL Cholesterol   


 


Vitamin B12   


 


TSH 3rd Generation   


 


Urine Eosinophils   Positive 


 


Ur Random Creatinine   


 


U Random Total Protein   


 


Ur Random Sodium   


 


Urine Total Volume   


 


Microalb/Creat Ratio   


 


Urine Opiates Screen  Negative  


 


Urine Methadone Screen  Negative  


 


Ur Barbiturates Screen  Negative  


 


Ur Phencyclidine Scrn  Negative  


 


Ur Amphetamines Screen  Negative  


 


U Benzodiazepines Scrn  Negative  


 


U Oth Cocaine Metabols  Negative  


 


U Cannabinoids Screen  Negative  


 


Complement C3   


 


Complement C4   


 


Hep Bs Antigen   


 


Hep Bs Antibody   


 


Hep B Core IgM Ab   


 


Hepatitis C Antibody   


 


HIV 1&2 Antibody Screen   


 


Crossmatch   


 


BBK History Checked   














  02/06/19 02/06/19 02/06/19





  06:00 06:00 06:00


 


WBC   


 


RBC   


 


Hgb   


 


Hct   


 


MCV   


 


MCH   


 


MCHC   


 


RDW   


 


Plt Count   


 


MPV   


 


Neut % (Auto)   


 


Lymph % (Auto)   


 


Mono % (Auto)   


 


Eos % (Auto)   


 


Baso % (Auto)   


 


Lymph # (Auto)   


 


Mono # (Auto)   


 


Eos # (Auto)   


 


Baso # (Auto)   


 


Absolute Neuts (auto)   


 


PT    15.0 H


 


INR    1.33


 


APTT    33.8


 


Sodium  136  


 


Potassium  3.1 L  


 


Chloride  100  


 


Carbon Dioxide  17 L  


 


Anion Gap  22 H  


 


BUN  181 H*  


 


Creatinine  13.5 H*  


 


Est GFR ( Amer)  4  


 


Est GFR (Non-Af Amer)  4  


 


Random Glucose  164 H  


 


Calcium  6.5 L*  


 


Phosphorus  9.4 H  


 


Magnesium  1.8  


 


Iron   


 


TIBC   


 


% Saturation   


 


Transferrin   


 


Ferritin   


 


Total Bilirubin  0.7  


 


AST  50  


 


ALT  36  


 


Alkaline Phosphatase  91  


 


Troponin I  2.23 H* D  


 


Total Protein  6.1  


 


Albumin  3.0  


 


Globulin  3.1  


 


Albumin/Globulin Ratio  1.0 L  


 


Triglycerides  148  


 


Cholesterol  99 L  


 


LDL Cholesterol Direct  55  


 


HDL Cholesterol  18 L  


 


Vitamin B12   


 


TSH 3rd Generation   3.04 


 


Urine Eosinophils   


 


Ur Random Creatinine   


 


U Random Total Protein   


 


Ur Random Sodium   


 


Urine Total Volume   


 


Microalb/Creat Ratio   


 


Urine Opiates Screen   


 


Urine Methadone Screen   


 


Ur Barbiturates Screen   


 


Ur Phencyclidine Scrn   


 


Ur Amphetamines Screen   


 


U Benzodiazepines Scrn   


 


U Oth Cocaine Metabols   


 


U Cannabinoids Screen   


 


Complement C3   


 


Complement C4   


 


Hep Bs Antigen   


 


Hep Bs Antibody   


 


Hep B Core IgM Ab   


 


Hepatitis C Antibody   


 


HIV 1&2 Antibody Screen   


 


Crossmatch   


 


BBK History Checked   














  02/06/19





  09:00


 


WBC 


 


RBC 


 


Hgb 


 


Hct 


 


MCV 


 


MCH 


 


MCHC 


 


RDW 


 


Plt Count 


 


MPV 


 


Neut % (Auto) 


 


Lymph % (Auto) 


 


Mono % (Auto) 


 


Eos % (Auto) 


 


Baso % (Auto) 


 


Lymph # (Auto) 


 


Mono # (Auto) 


 


Eos # (Auto) 


 


Baso # (Auto) 


 


Absolute Neuts (auto) 


 


PT 


 


INR 


 


APTT 


 


Sodium 


 


Potassium 


 


Chloride 


 


Carbon Dioxide 


 


Anion Gap 


 


BUN 


 


Creatinine 


 


Est GFR ( Amer) 


 


Est GFR (Non-Af Amer) 


 


Random Glucose 


 


Calcium 


 


Phosphorus 


 


Magnesium 


 


Iron 


 


TIBC 


 


% Saturation 


 


Transferrin 


 


Ferritin 


 


Total Bilirubin 


 


AST 


 


ALT 


 


Alkaline Phosphatase 


 


Troponin I 


 


Total Protein 


 


Albumin 


 


Globulin 


 


Albumin/Globulin Ratio 


 


Triglycerides 


 


Cholesterol 


 


LDL Cholesterol Direct 


 


HDL Cholesterol 


 


Vitamin B12 


 


TSH 3rd Generation 


 


Urine Eosinophils 


 


Ur Random Creatinine 


 


U Random Total Protein 


 


Ur Random Sodium 


 


Urine Total Volume 


 


Microalb/Creat Ratio 


 


Urine Opiates Screen 


 


Urine Methadone Screen 


 


Ur Barbiturates Screen 


 


Ur Phencyclidine Scrn 


 


Ur Amphetamines Screen 


 


U Benzodiazepines Scrn 


 


U Oth Cocaine Metabols 


 


U Cannabinoids Screen 


 


Complement C3 


 


Complement C4 


 


Hep Bs Antigen 


 


Hep Bs Antibody 


 


Hep B Core IgM Ab 


 


Hepatitis C Antibody 


 


HIV 1&2 Antibody Screen 


 


Crossmatch  See Detail


 


BBK History Checked  No verified bt











Radiology Impressions: 


                              Radiology Impressions





Extremity Ultrasound  02/04/19 15:03


IMPRESSION:


No sonographic evidence for deep venous thrombosis in the visualized 


segments of both lower extremities. 


 


 








Renal Ultrasound  02/05/19 09:28


IMPRESSION:


11 mm nonobstructing calculus lower pole left kidney.  Otherwise 


unremarkable.


 


 








Abdomen/Pelvis CT  02/05/19 10:24


IMPRESSION:


Obstructing 9 mm mid left ureteral calculus with mild left 


hydronephrosis 8 mm nonobstructing left lower pole renal calculus. 


Minor findings as above. Small bilateral pleural effusion, right 


greater than left. 


 


 








Chest X-Ray  02/05/19 12:34


IMPRESSION:


New right tunneled central venous dialysis catheter otherwise no 


significant change.  


 


 











Fingerstick Blood Sugar Results: 136





Review of Systems





- Review of Systems


Review of Systems: 


except for what was mentioned in HPI








Critical Care Progress Note





- Ventilator Checklist


Head of Bed 30 Degrees: Yes


PUD Prophalyxis: Yes


DVT Prophylaxis: Yes





- Nutrition


Nutrition: 


                                    Nutrition











 Category Date Time Status


 


 Renal Diet [DIET] Diets  02/05/19 Dinner Ordered














Assessment/Plan





- Assessment and Plan (Free Text)


Assessment: 


68 year old male with past medical history of arthritis presents with acute 

renal failure likely 2/2 to acute tubular necrosis vs. postobstructive nephrop

athy from dehydration. Patient is currently on sodium bicarbonate drip due to 

renal failure. Plan is for dialysis today.





Plan: 


Acute Renal Failure 2/2 to Acute Tubular Necrosis vs. Postobstructive 

Nephropathy


-BUN/Cr improved at 181/13.5 from 16.6 with unknown baseline creatinine


-CK: elevated 477


-Sparse urine output at this time


-Strict I and O with jovel. Measure daily weight


-3 L of NS already administered


-Continue Sodium bicarbonate drip with 3 amps


-Continue aranesp and phoslo


-Continue flomax 0.4 mg daily


-Dialysis scheduled for today.


-Renal ultrasound: 11 mm nonobstructing stone in lower pole of left kidney


-Replete electrolytes as needed.   


-Maintain euvolemia.   





Hypokalemia


-K: 3.1


-Patient will have potassium corrected through dialysis.





Tachypnea due to metabolic acidosis


-ABG 2/5: improved with pH: 7.22, pO2: 154, pCO2: 13 status post dialysis


-CXR: no active disease


-Maintain O2 saturation>90%.   


-Head of bed to 30 degrees  


-Conservative fluid management  


-Maintain oral hygiene  





Nephrolithiasis


-Renal ultrasound: 11 mm nonobstructing stone in lower pole of left kidney


-Abdominal CT: obstructive 9 mm mid left ureteral calculus with mild left 

hydronephrosis of 8 mm


-Scheduled for ureteral stent procedure with Dr. López today.





Elevated BNP 2/2 to CHF vs. pulmonary etiology


-BNP: 94952


-EKG: NSR with 1st degree AV block


-Echocardiogram: preserved EF with Grade I abnormal relaxation


-Troponinx3:  0.11, 0.27, 0.97 trending up


-Avoid beta blockers, anti-hypertensives due to hypotension


-Maintain MAP>65.    


-Monitor for S/S, HD compromise.   





Type II NSTEMI


-Troponinx3:  0.11, 0.27, 0.97 trending up


-Continue aspirin, lipitor


-Avoid beta blocker due to hypotension





History of tobacco abuse


-Cannot rule out COPD


-Duonebs Q2 PRN for shortness of breath





Normocytic Anemia


-H/H reduced to 6.6/18.9 


-2 U of PRBCs during dialysis ordered for today.


-GI, Dr. Soria, consulted for recommendations to evaluate for GI bleed.





Elevated D-dimer


-Bilateral lower extremity ultrasound: negative for DVT


-Avoid CTA to evaluate for PE due to acute renal failure


-Avoid V/Q scan to evaluate for PE due to current tachypnea. 





Rule out Pneumonia vs. UTI


-Vancomycin and cefepime day 2 for unlikely pneumonia


-Monitor for signs and symptoms of infection.   





GI prophylaxis  


-Protonix 40 mg daily





DVT prophylaxis  


-heparin 5000 U Q8 held due to possible GI Bleed





Patient plan discussed with attending.








- Date & Time


Date: 02/06/19


Time: 10:12





<Hi Paulino - Last Filed: 02/06/19 11:14>





CCU Objective





- Vital Signs / Intake & Output


Vital Signs (Last 4 hours): 


Vital Signs











  Pulse BP Pulse Ox


 


 02/06/19 08:56   121/66 


 


 02/06/19 08:55  78   99


 


 02/06/19 08:19  83  114/60  99


 


 02/06/19 08:00  81  115/64  100











Intake and Output (Last 8hrs): 


                                 Intake & Output











 02/05/19 02/06/19 02/06/19





 22:59 06:59 14:59


 


Intake Total 1640 840 


 


Output Total 850 1000 


 


Balance 790 -160 


 


Weight  241 lb 


 


Intake:   


 


  IV 1400 600 


 


    antibiotics 100 0 


 


    bicarb 1300 600 


 


  Oral 240 240 


 


Output:   


 


  Urine 350 500 


 


    Urethral (Jovel) 350 500 


 


  Stool 0 0 


 


  Emesis 0 0 


 


  Other 500 500 














- Medications


Active Medications: 


Active Medications











Generic Name Dose Route Start Last Admin





  Trade Name Freq  PRN Reason Stop Dose Admin


 


Albuterol/Ipratropium  3 ml  02/04/19 17:04  





  Duoneb 3 Mg/0.5 Mg (3 Ml) Ud  IH   





  Q2H PRN   





  Shortness of Breath   





     





     





     


 


Aspirin  81 mg  02/05/19 10:00  02/05/19 09:27





  Aspirin Chewable  PO   81 mg





  DAILY BRENT   Administration





     





     





     





     


 


Atorvastatin Calcium  40 mg  02/05/19 17:00  02/05/19 17:22





  Lipitor  PO   40 mg





  DIN BRENT   Administration





     





     





     





     


 


Calcium Acetate  2,001 mg  02/05/19 12:00  02/05/19 17:22





  Phoslo  PO   2,001 mg





  WM BRENT   Administration





     





     





     





     


 


Darbepoetin Balta  100 mcg  02/05/19 10:00  02/05/19 13:50





  Aranesp  IVP   100 mcg





  QWK BRENT   Administration





     





     





     





     


 


Cefepime HCl  1 gm in 100 mls @ 100 mls/hr  02/04/19 18:45  02/05/19 17:45





  Maxipime 1gm  IVPB   100 mls/hr





  Q24H BRENT   Administration





     





     





  Protocol   





     


 


Metoprolol Succinate  25 mg  02/06/19 10:30  





  Toprol Xl  PO   





  BRK BRENT   





     





     





     





     


 


Nystatin  0 ea  02/05/19 14:00  02/05/19 18:51





  Mycostatin Cream  TOP   1 applic





  TID BRENT   Administration





     





     





     





     


 


Pantoprazole Sodium  40 mg  02/05/19 10:00  02/06/19 09:32





  Protonix Inj  IVP   40 mg





  DAILY BRENT   Administration





     





     





     





     


 


Tamsulosin HCl  0.4 mg  02/05/19 12:30  02/05/19 17:23





  Flomax  PO   0.4 mg





  DAILY BRENT   Administration





     





     





     





     


 


Vitamin B Complex/Vit C/Folic Acid  1 tab  02/06/19 08:00  





  Nephro-Seda  PO   





  0800 BRENT   





     





     





     





     














- Patient Studies


Lab Studies: 


                              Microbiology Studies











 02/04/19 15:00 Blood Culture - Preliminary





 Blood-Venous    NO GROWTH AFTER 24 HOURS


 


 02/04/19 14:30 Blood Culture - Preliminary





 Blood-Venous    NO GROWTH AFTER 24 HOURS








                                   Lab Studies











  02/06/19 02/06/19 02/06/19 Range/Units





  09:43 09:00 06:00 


 


WBC     (4.5-11.0)  10^3/uL


 


RBC     (3.5-6.1)  10^6/uL


 


Hgb     (14.0-18.0)  g/dL


 


Hct     (42.0-52.0)  %


 


MCV     (80.0-105.0)  fl


 


MCH     (25.0-35.0)  pg


 


MCHC     (31.0-37.0)  g/dl


 


RDW     (11.5-14.5)  %


 


Plt Count     (120.0-450.0)  10^3/uL


 


MPV     (7.0-11.0)  fl


 


Neut % (Auto)     (50.0-68.0)  %


 


Lymph % (Auto)     (22.0-35.0)  %


 


Mono % (Auto)     (1.0-6.0)  %


 


Eos % (Auto)     (1.5-5.0)  %


 


Baso % (Auto)     (0.0-3.0)  %


 


Lymph # (Auto)     (1.2-3.4)  


 


Mono # (Auto)     (0.1-0.6)  


 


Eos # (Auto)     (0.0-0.7)  


 


Baso # (Auto)     (0.0-2.0)  K/mm3


 


Absolute Neuts (auto)     (1.4-6.5)  


 


PT    15.0 H  (9.4-12.5)  SECONDS


 


INR    1.33  


 


APTT    33.8  (26.9-38.3)  Seconds


 


Sodium     (132-148)  mmol/L


 


Potassium     (3.6-5.0)  mmol/L


 


Chloride     ()  mmol/L


 


Carbon Dioxide     (21-33)  mmol/L


 


Anion Gap     (10-20)  


 


BUN     (7-21)  mg/dL


 


Creatinine     (0.8-1.5)  mg/dl


 


Est GFR (African Amer)     


 


Est GFR (Non-Af Amer)     


 


Random Glucose     ()  mg/dL


 


Calcium     (8.4-10.5)  mg/dL


 


Phosphorus     (2.5-4.5)  mg/dL


 


Magnesium     (1.7-2.2)  mg/dL


 


Transferrin     (206-381)  mg/dL


 


Ferritin     ng/mL


 


Total Bilirubin     (0.2-1.3)  mg/dL


 


AST     (17-59)  U/L


 


ALT     (7-56)  U/L


 


Alkaline Phosphatase     ()  U/L


 


Troponin I     ng/mL


 


Total Protein     (5.8-8.3)  g/dL


 


Albumin     (3.0-4.8)  g/dL


 


Globulin     gm/dL


 


Albumin/Globulin Ratio     (1.1-1.8)  


 


Triglycerides     ()  mg/dL


 


Cholesterol     (130-200)  mg/dL


 


LDL Cholesterol Direct     (0-129)  mg/dL


 


HDL Cholesterol     (29-60)  mg/dL


 


Vitamin B12     (239-931)  pg/mL


 


TSH 3rd Generation     (0.46-4.68)  mIU/mL


 


Urine Eosinophils     


 


Ur Random Creatinine     ()  mg/dL


 


U Random Total Protein     ()  mg/g creat


 


Ur Random Sodium     meq/L


 


Urine Total Volume     mg/dL


 


Microalb/Creat Ratio     (<30)   


 


Urine Opiates Screen     (NEGATIVE)  


 


Urine Methadone Screen     (NEGATIVE)  


 


Ur Barbiturates Screen     (NEGATIVE)  


 


Ur Phencyclidine Scrn     (NEGATIVE)  


 


Ur Amphetamines Screen     (NEGATIVE)  


 


U Benzodiazepines Scrn     (NEGATIVE)  


 


U Oth Cocaine Metabols     (NEGATIVE)  


 


U Cannabinoids Screen     (NEGATIVE)  


 


Complement C3     (88.0-165.0)  mg/dL


 


Complement C4     (14.0-44.0)  mg/dL


 


Hep Bs Antigen     (NEGATIVE)  


 


Hep Bs Antibody     (NEGATIVE)  


 


Hep B Core IgM Ab     (NEGATIVE)  


 


Hepatitis C Antibody     (NEGATIVE)  


 


HIV 1&2 Antibody Screen     (NEGATIVE)  


 


Blood Type   A POSITIVE   


 


Blood Type Confirm  A POSITIVE    


 


Antibody Screen   Negative   


 


Crossmatch   See Detail   


 


BBK History Checked   No verified bt   














  02/06/19 02/06/19 02/06/19 Range/Units





  06:00 06:00 06:00 


 


WBC    8.1  D  (4.5-11.0)  10^3/uL


 


RBC    2.36 L  (3.5-6.1)  10^6/uL


 


Hgb    6.6 L*  (14.0-18.0)  g/dL


 


Hct    18.9 L*  (42.0-52.0)  %


 


MCV    80.1  (80.0-105.0)  fl


 


MCH    28.0  (25.0-35.0)  pg


 


MCHC    34.9  (31.0-37.0)  g/dl


 


RDW    14.5  (11.5-14.5)  %


 


Plt Count    192  (120.0-450.0)  10^3/uL


 


MPV    9.3  (7.0-11.0)  fl


 


Neut % (Auto)    86.6 H  (50.0-68.0)  %


 


Lymph % (Auto)    10.3 L  (22.0-35.0)  %


 


Mono % (Auto)    3.1  (1.0-6.0)  %


 


Eos % (Auto)    0.0 L  (1.5-5.0)  %


 


Baso % (Auto)    0.0  (0.0-3.0)  %


 


Lymph # (Auto)    0.8 L  (1.2-3.4)  


 


Mono # (Auto)    0.3  (0.1-0.6)  


 


Eos # (Auto)    0.0  (0.0-0.7)  


 


Baso # (Auto)    0.00  (0.0-2.0)  K/mm3


 


Absolute Neuts (auto)    7.04 H  (1.4-6.5)  


 


PT     (9.4-12.5)  SECONDS


 


INR     


 


APTT     (26.9-38.3)  Seconds


 


Sodium   136   (132-148)  mmol/L


 


Potassium   3.1 L   (3.6-5.0)  mmol/L


 


Chloride   100   ()  mmol/L


 


Carbon Dioxide   17 L   (21-33)  mmol/L


 


Anion Gap   22 H   (10-20)  


 


BUN   181 H*   (7-21)  mg/dL


 


Creatinine   13.5 H*   (0.8-1.5)  mg/dl


 


Est GFR (African Amer)   4   


 


Est GFR (Non-Af Amer)   4   


 


Random Glucose   164 H   ()  mg/dL


 


Calcium   6.5 L*   (8.4-10.5)  mg/dL


 


Phosphorus   9.4 H   (2.5-4.5)  mg/dL


 


Magnesium   1.8   (1.7-2.2)  mg/dL


 


Transferrin     (206-381)  mg/dL


 


Ferritin     ng/mL


 


Total Bilirubin   0.7   (0.2-1.3)  mg/dL


 


AST   50   (17-59)  U/L


 


ALT   36   (7-56)  U/L


 


Alkaline Phosphatase   91   ()  U/L


 


Troponin I   2.23 H* D   ng/mL


 


Total Protein   6.1   (5.8-8.3)  g/dL


 


Albumin   3.0   (3.0-4.8)  g/dL


 


Globulin   3.1   gm/dL


 


Albumin/Globulin Ratio   1.0 L   (1.1-1.8)  


 


Triglycerides   148   ()  mg/dL


 


Cholesterol   99 L   (130-200)  mg/dL


 


LDL Cholesterol Direct   55   (0-129)  mg/dL


 


HDL Cholesterol   18 L   (29-60)  mg/dL


 


Vitamin B12     (239-931)  pg/mL


 


TSH 3rd Generation  3.04    (0.46-4.68)  mIU/mL


 


Urine Eosinophils     


 


Ur Random Creatinine     ()  mg/dL


 


U Random Total Protein     ()  mg/g creat


 


Ur Random Sodium     meq/L


 


Urine Total Volume     mg/dL


 


Microalb/Creat Ratio     (<30)   


 


Urine Opiates Screen     (NEGATIVE)  


 


Urine Methadone Screen     (NEGATIVE)  


 


Ur Barbiturates Screen     (NEGATIVE)  


 


Ur Phencyclidine Scrn     (NEGATIVE)  


 


Ur Amphetamines Screen     (NEGATIVE)  


 


U Benzodiazepines Scrn     (NEGATIVE)  


 


U Oth Cocaine Metabols     (NEGATIVE)  


 


U Cannabinoids Screen     (NEGATIVE)  


 


Complement C3     (88.0-165.0)  mg/dL


 


Complement C4     (14.0-44.0)  mg/dL


 


Hep Bs Antigen     (NEGATIVE)  


 


Hep Bs Antibody     (NEGATIVE)  


 


Hep B Core IgM Ab     (NEGATIVE)  


 


Hepatitis C Antibody     (NEGATIVE)  


 


HIV 1&2 Antibody Screen     (NEGATIVE)  


 


Blood Type     


 


Blood Type Confirm     


 


Antibody Screen     


 


Crossmatch     


 


BBK History Checked     














  02/05/19 02/05/19 02/05/19 Range/Units





  15:10 15:10 11:00 


 


WBC     (4.5-11.0)  10^3/uL


 


RBC     (3.5-6.1)  10^6/uL


 


Hgb     (14.0-18.0)  g/dL


 


Hct     (42.0-52.0)  %


 


MCV     (80.0-105.0)  fl


 


MCH     (25.0-35.0)  pg


 


MCHC     (31.0-37.0)  g/dl


 


RDW     (11.5-14.5)  %


 


Plt Count     (120.0-450.0)  10^3/uL


 


MPV     (7.0-11.0)  fl


 


Neut % (Auto)     (50.0-68.0)  %


 


Lymph % (Auto)     (22.0-35.0)  %


 


Mono % (Auto)     (1.0-6.0)  %


 


Eos % (Auto)     (1.5-5.0)  %


 


Baso % (Auto)     (0.0-3.0)  %


 


Lymph # (Auto)     (1.2-3.4)  


 


Mono # (Auto)     (0.1-0.6)  


 


Eos # (Auto)     (0.0-0.7)  


 


Baso # (Auto)     (0.0-2.0)  K/mm3


 


Absolute Neuts (auto)     (1.4-6.5)  


 


PT     (9.4-12.5)  SECONDS


 


INR     


 


APTT     (26.9-38.3)  Seconds


 


Sodium     (132-148)  mmol/L


 


Potassium     (3.6-5.0)  mmol/L


 


Chloride     ()  mmol/L


 


Carbon Dioxide     (21-33)  mmol/L


 


Anion Gap     (10-20)  


 


BUN     (7-21)  mg/dL


 


Creatinine     (0.8-1.5)  mg/dl


 


Est GFR (African Amer)     


 


Est GFR (Non-Af Amer)     


 


Random Glucose     ()  mg/dL


 


Calcium     (8.4-10.5)  mg/dL


 


Phosphorus     (2.5-4.5)  mg/dL


 


Magnesium     (1.7-2.2)  mg/dL


 


Transferrin     (206-381)  mg/dL


 


Ferritin     ng/mL


 


Total Bilirubin     (0.2-1.3)  mg/dL


 


AST     (17-59)  U/L


 


ALT     (7-56)  U/L


 


Alkaline Phosphatase     ()  U/L


 


Troponin I     ng/mL


 


Total Protein     (5.8-8.3)  g/dL


 


Albumin     (3.0-4.8)  g/dL


 


Globulin     gm/dL


 


Albumin/Globulin Ratio     (1.1-1.8)  


 


Triglycerides     ()  mg/dL


 


Cholesterol     (130-200)  mg/dL


 


LDL Cholesterol Direct     (0-129)  mg/dL


 


HDL Cholesterol     (29-60)  mg/dL


 


Vitamin B12     (239-931)  pg/mL


 


TSH 3rd Generation     (0.46-4.68)  mIU/mL


 


Urine Eosinophils  Positive    


 


Ur Random Creatinine    54  ()  mg/dL


 


U Random Total Protein     ()  mg/g creat


 


Ur Random Sodium    96  meq/L


 


Urine Total Volume     mg/dL


 


Microalb/Creat Ratio     (<30)   


 


Urine Opiates Screen   Negative   (NEGATIVE)  


 


Urine Methadone Screen   Negative   (NEGATIVE)  


 


Ur Barbiturates Screen   Negative   (NEGATIVE)  


 


Ur Phencyclidine Scrn   Negative   (NEGATIVE)  


 


Ur Amphetamines Screen   Negative   (NEGATIVE)  


 


U Benzodiazepines Scrn   Negative   (NEGATIVE)  


 


U Oth Cocaine Metabols   Negative   (NEGATIVE)  


 


U Cannabinoids Screen   Negative   (NEGATIVE)  


 


Complement C3     (88.0-165.0)  mg/dL


 


Complement C4     (14.0-44.0)  mg/dL


 


Hep Bs Antigen     (NEGATIVE)  


 


Hep Bs Antibody     (NEGATIVE)  


 


Hep B Core IgM Ab     (NEGATIVE)  


 


Hepatitis C Antibody     (NEGATIVE)  


 


HIV 1&2 Antibody Screen     (NEGATIVE)  


 


Blood Type     


 


Blood Type Confirm     


 


Antibody Screen     


 


Crossmatch     


 


BBK History Checked     














  02/05/19 02/05/19 02/05/19 Range/Units





  11:00 11:00 10:10 


 


WBC     (4.5-11.0)  10^3/uL


 


RBC     (3.5-6.1)  10^6/uL


 


Hgb     (14.0-18.0)  g/dL


 


Hct     (42.0-52.0)  %


 


MCV     (80.0-105.0)  fl


 


MCH     (25.0-35.0)  pg


 


MCHC     (31.0-37.0)  g/dl


 


RDW     (11.5-14.5)  %


 


Plt Count     (120.0-450.0)  10^3/uL


 


MPV     (7.0-11.0)  fl


 


Neut % (Auto)     (50.0-68.0)  %


 


Lymph % (Auto)     (22.0-35.0)  %


 


Mono % (Auto)     (1.0-6.0)  %


 


Eos % (Auto)     (1.5-5.0)  %


 


Baso % (Auto)     (0.0-3.0)  %


 


Lymph # (Auto)     (1.2-3.4)  


 


Mono # (Auto)     (0.1-0.6)  


 


Eos # (Auto)     (0.0-0.7)  


 


Baso # (Auto)     (0.0-2.0)  K/mm3


 


Absolute Neuts (auto)     (1.4-6.5)  


 


PT     (9.4-12.5)  SECONDS


 


INR     


 


APTT     (26.9-38.3)  Seconds


 


Sodium     (132-148)  mmol/L


 


Potassium     (3.6-5.0)  mmol/L


 


Chloride     ()  mmol/L


 


Carbon Dioxide     (21-33)  mmol/L


 


Anion Gap     (10-20)  


 


BUN     (7-21)  mg/dL


 


Creatinine     (0.8-1.5)  mg/dl


 


Est GFR (African Amer)     


 


Est GFR (Non-Af Amer)     


 


Random Glucose     ()  mg/dL


 


Calcium     (8.4-10.5)  mg/dL


 


Phosphorus     (2.5-4.5)  mg/dL


 


Magnesium     (1.7-2.2)  mg/dL


 


Transferrin     (206-381)  mg/dL


 


Ferritin     ng/mL


 


Total Bilirubin     (0.2-1.3)  mg/dL


 


AST     (17-59)  U/L


 


ALT     (7-56)  U/L


 


Alkaline Phosphatase     ()  U/L


 


Troponin I     ng/mL


 


Total Protein     (5.8-8.3)  g/dL


 


Albumin     (3.0-4.8)  g/dL


 


Globulin     gm/dL


 


Albumin/Globulin Ratio     (1.1-1.8)  


 


Triglycerides     ()  mg/dL


 


Cholesterol     (130-200)  mg/dL


 


LDL Cholesterol Direct     (0-129)  mg/dL


 


HDL Cholesterol     (29-60)  mg/dL


 


Vitamin B12     (239-931)  pg/mL


 


TSH 3rd Generation     (0.46-4.68)  mIU/mL


 


Urine Eosinophils     


 


Ur Random Creatinine  59    ()  mg/dL


 


U Random Total Protein   966 H   ()  mg/g creat


 


Ur Random Sodium     meq/L


 


Urine Total Volume  7.7    mg/dL


 


Microalb/Creat Ratio  130 H    (<30)   


 


Urine Opiates Screen     (NEGATIVE)  


 


Urine Methadone Screen     (NEGATIVE)  


 


Ur Barbiturates Screen     (NEGATIVE)  


 


Ur Phencyclidine Scrn     (NEGATIVE)  


 


Ur Amphetamines Screen     (NEGATIVE)  


 


U Benzodiazepines Scrn     (NEGATIVE)  


 


U Oth Cocaine Metabols     (NEGATIVE)  


 


U Cannabinoids Screen     (NEGATIVE)  


 


Complement C3     (88.0-165.0)  mg/dL


 


Complement C4     (14.0-44.0)  mg/dL


 


Hep Bs Antigen     (NEGATIVE)  


 


Hep Bs Antibody     (NEGATIVE)  


 


Hep B Core IgM Ab     (NEGATIVE)  


 


Hepatitis C Antibody     (NEGATIVE)  


 


HIV 1&2 Antibody Screen    Negative  (NEGATIVE)  


 


Blood Type     


 


Blood Type Confirm     


 


Antibody Screen     


 


Crossmatch     


 


BBK History Checked     














  02/05/19 02/05/19 02/05/19 Range/Units





  10:10 10:10 10:10 


 


WBC     (4.5-11.0)  10^3/uL


 


RBC     (3.5-6.1)  10^6/uL


 


Hgb     (14.0-18.0)  g/dL


 


Hct     (42.0-52.0)  %


 


MCV     (80.0-105.0)  fl


 


MCH     (25.0-35.0)  pg


 


MCHC     (31.0-37.0)  g/dl


 


RDW     (11.5-14.5)  %


 


Plt Count     (120.0-450.0)  10^3/uL


 


MPV     (7.0-11.0)  fl


 


Neut % (Auto)     (50.0-68.0)  %


 


Lymph % (Auto)     (22.0-35.0)  %


 


Mono % (Auto)     (1.0-6.0)  %


 


Eos % (Auto)     (1.5-5.0)  %


 


Baso % (Auto)     (0.0-3.0)  %


 


Lymph # (Auto)     (1.2-3.4)  


 


Mono # (Auto)     (0.1-0.6)  


 


Eos # (Auto)     (0.0-0.7)  


 


Baso # (Auto)     (0.0-2.0)  K/mm3


 


Absolute Neuts (auto)     (1.4-6.5)  


 


PT     (9.4-12.5)  SECONDS


 


INR     


 


APTT     (26.9-38.3)  Seconds


 


Sodium     (132-148)  mmol/L


 


Potassium     (3.6-5.0)  mmol/L


 


Chloride     ()  mmol/L


 


Carbon Dioxide     (21-33)  mmol/L


 


Anion Gap     (10-20)  


 


BUN     (7-21)  mg/dL


 


Creatinine     (0.8-1.5)  mg/dl


 


Est GFR (African Amer)     


 


Est GFR (Non-Af Amer)     


 


Random Glucose     ()  mg/dL


 


Calcium     (8.4-10.5)  mg/dL


 


Phosphorus     (2.5-4.5)  mg/dL


 


Magnesium     (1.7-2.2)  mg/dL


 


Transferrin     (206-381)  mg/dL


 


Ferritin    489.0  ng/mL


 


Total Bilirubin     (0.2-1.3)  mg/dL


 


AST     (17-59)  U/L


 


ALT     (7-56)  U/L


 


Alkaline Phosphatase     ()  U/L


 


Troponin I     ng/mL


 


Total Protein     (5.8-8.3)  g/dL


 


Albumin     (3.0-4.8)  g/dL


 


Globulin     gm/dL


 


Albumin/Globulin Ratio     (1.1-1.8)  


 


Triglycerides     ()  mg/dL


 


Cholesterol     (130-200)  mg/dL


 


LDL Cholesterol Direct     (0-129)  mg/dL


 


HDL Cholesterol     (29-60)  mg/dL


 


Vitamin B12    787  (239-931)  pg/mL


 


TSH 3rd Generation     (0.46-4.68)  mIU/mL


 


Urine Eosinophils     


 


Ur Random Creatinine     ()  mg/dL


 


U Random Total Protein     ()  mg/g creat


 


Ur Random Sodium     meq/L


 


Urine Total Volume     mg/dL


 


Microalb/Creat Ratio     (<30)   


 


Urine Opiates Screen     (NEGATIVE)  


 


Urine Methadone Screen     (NEGATIVE)  


 


Ur Barbiturates Screen     (NEGATIVE)  


 


Ur Phencyclidine Scrn     (NEGATIVE)  


 


Ur Amphetamines Screen     (NEGATIVE)  


 


U Benzodiazepines Scrn     (NEGATIVE)  


 


U Oth Cocaine Metabols     (NEGATIVE)  


 


U Cannabinoids Screen     (NEGATIVE)  


 


Complement C3     (88.0-165.0)  mg/dL


 


Complement C4     (14.0-44.0)  mg/dL


 


Hep Bs Antigen    Negative  (NEGATIVE)  


 


Hep Bs Antibody  Negative    (NEGATIVE)  


 


Hep B Core IgM Ab   Negative   (NEGATIVE)  


 


Hepatitis C Antibody   Negative   (NEGATIVE)  


 


HIV 1&2 Antibody Screen     (NEGATIVE)  


 


Blood Type     


 


Blood Type Confirm     


 


Antibody Screen     


 


Crossmatch     


 


BBK History Checked     














  02/05/19 Range/Units





  10:10 


 


WBC   (4.5-11.0)  10^3/uL


 


RBC   (3.5-6.1)  10^6/uL


 


Hgb   (14.0-18.0)  g/dL


 


Hct   (42.0-52.0)  %


 


MCV   (80.0-105.0)  fl


 


MCH   (25.0-35.0)  pg


 


MCHC   (31.0-37.0)  g/dl


 


RDW   (11.5-14.5)  %


 


Plt Count   (120.0-450.0)  10^3/uL


 


MPV   (7.0-11.0)  fl


 


Neut % (Auto)   (50.0-68.0)  %


 


Lymph % (Auto)   (22.0-35.0)  %


 


Mono % (Auto)   (1.0-6.0)  %


 


Eos % (Auto)   (1.5-5.0)  %


 


Baso % (Auto)   (0.0-3.0)  %


 


Lymph # (Auto)   (1.2-3.4)  


 


Mono # (Auto)   (0.1-0.6)  


 


Eos # (Auto)   (0.0-0.7)  


 


Baso # (Auto)   (0.0-2.0)  K/mm3


 


Absolute Neuts (auto)   (1.4-6.5)  


 


PT   (9.4-12.5)  SECONDS


 


INR   


 


APTT   (26.9-38.3)  Seconds


 


Sodium   (132-148)  mmol/L


 


Potassium   (3.6-5.0)  mmol/L


 


Chloride   ()  mmol/L


 


Carbon Dioxide   (21-33)  mmol/L


 


Anion Gap   (10-20)  


 


BUN   (7-21)  mg/dL


 


Creatinine   (0.8-1.5)  mg/dl


 


Est GFR (African Amer)   


 


Est GFR (Non-Af Amer)   


 


Random Glucose   ()  mg/dL


 


Calcium   (8.4-10.5)  mg/dL


 


Phosphorus   (2.5-4.5)  mg/dL


 


Magnesium   (1.7-2.2)  mg/dL


 


Transferrin  100.88 L  (206-381)  mg/dL


 


Ferritin   ng/mL


 


Total Bilirubin   (0.2-1.3)  mg/dL


 


AST   (17-59)  U/L


 


ALT   (7-56)  U/L


 


Alkaline Phosphatase   ()  U/L


 


Troponin I   ng/mL


 


Total Protein   (5.8-8.3)  g/dL


 


Albumin   (3.0-4.8)  g/dL


 


Globulin   gm/dL


 


Albumin/Globulin Ratio   (1.1-1.8)  


 


Triglycerides   ()  mg/dL


 


Cholesterol   (130-200)  mg/dL


 


LDL Cholesterol Direct   (0-129)  mg/dL


 


HDL Cholesterol   (29-60)  mg/dL


 


Vitamin B12   (239-931)  pg/mL


 


TSH 3rd Generation   (0.46-4.68)  mIU/mL


 


Urine Eosinophils   


 


Ur Random Creatinine   ()  mg/dL


 


U Random Total Protein   ()  mg/g creat


 


Ur Random Sodium   meq/L


 


Urine Total Volume   mg/dL


 


Microalb/Creat Ratio   (<30)   


 


Urine Opiates Screen   (NEGATIVE)  


 


Urine Methadone Screen   (NEGATIVE)  


 


Ur Barbiturates Screen   (NEGATIVE)  


 


Ur Phencyclidine Scrn   (NEGATIVE)  


 


Ur Amphetamines Screen   (NEGATIVE)  


 


U Benzodiazepines Scrn   (NEGATIVE)  


 


U Oth Cocaine Metabols   (NEGATIVE)  


 


U Cannabinoids Screen   (NEGATIVE)  


 


Complement C3  93.0  (88.0-165.0)  mg/dL


 


Complement C4  36.5  (14.0-44.0)  mg/dL


 


Hep Bs Antigen   (NEGATIVE)  


 


Hep Bs Antibody   (NEGATIVE)  


 


Hep B Core IgM Ab   (NEGATIVE)  


 


Hepatitis C Antibody   (NEGATIVE)  


 


HIV 1&2 Antibody Screen   (NEGATIVE)  


 


Blood Type   


 


Blood Type Confirm   


 


Antibody Screen   


 


Crossmatch   


 


BBK History Checked   








                         Laboratory Results - last 24 hr











  02/05/19 02/05/19 02/05/19





  10:10 10:10 10:10


 


WBC   


 


RBC   


 


Hgb   


 


Hct   


 


MCV   


 


MCH   


 


MCHC   


 


RDW   


 


Plt Count   


 


MPV   


 


Neut % (Auto)   


 


Lymph % (Auto)   


 


Mono % (Auto)   


 


Eos % (Auto)   


 


Baso % (Auto)   


 


Lymph # (Auto)   


 


Mono # (Auto)   


 


Eos # (Auto)   


 


Baso # (Auto)   


 


Absolute Neuts (auto)   


 


PT   


 


INR   


 


APTT   


 


Sodium   


 


Potassium   


 


Chloride   


 


Carbon Dioxide   


 


Anion Gap   


 


BUN   


 


Creatinine   


 


Est GFR ( Amer)   


 


Est GFR (Non-Af Amer)   


 


Random Glucose   


 


Calcium   


 


Phosphorus   


 


Magnesium   


 


Transferrin  100.88 L  


 


Ferritin   489.0 


 


Total Bilirubin   


 


AST   


 


ALT   


 


Alkaline Phosphatase   


 


Troponin I   


 


Total Protein   


 


Albumin   


 


Globulin   


 


Albumin/Globulin Ratio   


 


Triglycerides   


 


Cholesterol   


 


LDL Cholesterol Direct   


 


HDL Cholesterol   


 


Vitamin B12   787 


 


TSH 3rd Generation   


 


Urine Eosinophils   


 


Ur Random Creatinine   


 


U Random Total Protein   


 


Ur Random Sodium   


 


Urine Total Volume   


 


Microalb/Creat Ratio   


 


Urine Opiates Screen   


 


Urine Methadone Screen   


 


Ur Barbiturates Screen   


 


Ur Phencyclidine Scrn   


 


Ur Amphetamines Screen   


 


U Benzodiazepines Scrn   


 


U Oth Cocaine Metabols   


 


U Cannabinoids Screen   


 


Complement C3  93.0  


 


Complement C4  36.5  


 


Hep Bs Antigen   Negative 


 


Hep Bs Antibody   


 


Hep B Core IgM Ab    Negative


 


Hepatitis C Antibody    Negative


 


HIV 1&2 Antibody Screen   


 


Blood Type   


 


Blood Type Confirm   


 


Antibody Screen   


 


Crossmatch   


 


BBK History Checked   














  02/05/19 02/05/19 02/05/19





  10:10 10:10 11:00


 


WBC   


 


RBC   


 


Hgb   


 


Hct   


 


MCV   


 


MCH   


 


MCHC   


 


RDW   


 


Plt Count   


 


MPV   


 


Neut % (Auto)   


 


Lymph % (Auto)   


 


Mono % (Auto)   


 


Eos % (Auto)   


 


Baso % (Auto)   


 


Lymph # (Auto)   


 


Mono # (Auto)   


 


Eos # (Auto)   


 


Baso # (Auto)   


 


Absolute Neuts (auto)   


 


PT   


 


INR   


 


APTT   


 


Sodium   


 


Potassium   


 


Chloride   


 


Carbon Dioxide   


 


Anion Gap   


 


BUN   


 


Creatinine   


 


Est GFR ( Amer)   


 


Est GFR (Non-Af Amer)   


 


Random Glucose   


 


Calcium   


 


Phosphorus   


 


Magnesium   


 


Transferrin   


 


Ferritin   


 


Total Bilirubin   


 


AST   


 


ALT   


 


Alkaline Phosphatase   


 


Troponin I   


 


Total Protein   


 


Albumin   


 


Globulin   


 


Albumin/Globulin Ratio   


 


Triglycerides   


 


Cholesterol   


 


LDL Cholesterol Direct   


 


HDL Cholesterol   


 


Vitamin B12   


 


TSH 3rd Generation   


 


Urine Eosinophils   


 


Ur Random Creatinine   


 


U Random Total Protein    966 H


 


Ur Random Sodium   


 


Urine Total Volume   


 


Microalb/Creat Ratio   


 


Urine Opiates Screen   


 


Urine Methadone Screen   


 


Ur Barbiturates Screen   


 


Ur Phencyclidine Scrn   


 


Ur Amphetamines Screen   


 


U Benzodiazepines Scrn   


 


U Oth Cocaine Metabols   


 


U Cannabinoids Screen   


 


Complement C3   


 


Complement C4   


 


Hep Bs Antigen   


 


Hep Bs Antibody  Negative  


 


Hep B Core IgM Ab   


 


Hepatitis C Antibody   


 


HIV 1&2 Antibody Screen   Negative 


 


Blood Type   


 


Blood Type Confirm   


 


Antibody Screen   


 


Crossmatch   


 


BBK History Checked   














  02/05/19 02/05/19 02/05/19





  11:00 11:00 15:10


 


WBC   


 


RBC   


 


Hgb   


 


Hct   


 


MCV   


 


MCH   


 


MCHC   


 


RDW   


 


Plt Count   


 


MPV   


 


Neut % (Auto)   


 


Lymph % (Auto)   


 


Mono % (Auto)   


 


Eos % (Auto)   


 


Baso % (Auto)   


 


Lymph # (Auto)   


 


Mono # (Auto)   


 


Eos # (Auto)   


 


Baso # (Auto)   


 


Absolute Neuts (auto)   


 


PT   


 


INR   


 


APTT   


 


Sodium   


 


Potassium   


 


Chloride   


 


Carbon Dioxide   


 


Anion Gap   


 


BUN   


 


Creatinine   


 


Est GFR ( Amer)   


 


Est GFR (Non-Af Amer)   


 


Random Glucose   


 


Calcium   


 


Phosphorus   


 


Magnesium   


 


Transferrin   


 


Ferritin   


 


Total Bilirubin   


 


AST   


 


ALT   


 


Alkaline Phosphatase   


 


Troponin I   


 


Total Protein   


 


Albumin   


 


Globulin   


 


Albumin/Globulin Ratio   


 


Triglycerides   


 


Cholesterol   


 


LDL Cholesterol Direct   


 


HDL Cholesterol   


 


Vitamin B12   


 


TSH 3rd Generation   


 


Urine Eosinophils   


 


Ur Random Creatinine  59  54 


 


U Random Total Protein   


 


Ur Random Sodium   96 


 


Urine Total Volume  7.7  


 


Microalb/Creat Ratio  130 H  


 


Urine Opiates Screen    Negative


 


Urine Methadone Screen    Negative


 


Ur Barbiturates Screen    Negative


 


Ur Phencyclidine Scrn    Negative


 


Ur Amphetamines Screen    Negative


 


U Benzodiazepines Scrn    Negative


 


U Oth Cocaine Metabols    Negative


 


U Cannabinoids Screen    Negative


 


Complement C3   


 


Complement C4   


 


Hep Bs Antigen   


 


Hep Bs Antibody   


 


Hep B Core IgM Ab   


 


Hepatitis C Antibody   


 


HIV 1&2 Antibody Screen   


 


Blood Type   


 


Blood Type Confirm   


 


Antibody Screen   


 


Crossmatch   


 


BBK History Checked   














  02/05/19 02/06/19 02/06/19





  15:10 06:00 06:00


 


WBC   8.1  D 


 


RBC   2.36 L 


 


Hgb   6.6 L* 


 


Hct   18.9 L* 


 


MCV   80.1 


 


MCH   28.0 


 


MCHC   34.9 


 


RDW   14.5 


 


Plt Count   192 


 


MPV   9.3 


 


Neut % (Auto)   86.6 H 


 


Lymph % (Auto)   10.3 L 


 


Mono % (Auto)   3.1 


 


Eos % (Auto)   0.0 L 


 


Baso % (Auto)   0.0 


 


Lymph # (Auto)   0.8 L 


 


Mono # (Auto)   0.3 


 


Eos # (Auto)   0.0 


 


Baso # (Auto)   0.00 


 


Absolute Neuts (auto)   7.04 H 


 


PT   


 


INR   


 


APTT   


 


Sodium    136


 


Potassium    3.1 L


 


Chloride    100


 


Carbon Dioxide    17 L


 


Anion Gap    22 H


 


BUN    181 H*


 


Creatinine    13.5 H*


 


Est GFR ( Amer)    4


 


Est GFR (Non-Af Amer)    4


 


Random Glucose    164 H


 


Calcium    6.5 L*


 


Phosphorus    9.4 H


 


Magnesium    1.8


 


Transferrin   


 


Ferritin   


 


Total Bilirubin    0.7


 


AST    50


 


ALT    36


 


Alkaline Phosphatase    91


 


Troponin I    2.23 H* D


 


Total Protein    6.1


 


Albumin    3.0


 


Globulin    3.1


 


Albumin/Globulin Ratio    1.0 L


 


Triglycerides    148


 


Cholesterol    99 L


 


LDL Cholesterol Direct    55


 


HDL Cholesterol    18 L


 


Vitamin B12   


 


TSH 3rd Generation   


 


Urine Eosinophils  Positive  


 


Ur Random Creatinine   


 


U Random Total Protein   


 


Ur Random Sodium   


 


Urine Total Volume   


 


Microalb/Creat Ratio   


 


Urine Opiates Screen   


 


Urine Methadone Screen   


 


Ur Barbiturates Screen   


 


Ur Phencyclidine Scrn   


 


Ur Amphetamines Screen   


 


U Benzodiazepines Scrn   


 


U Oth Cocaine Metabols   


 


U Cannabinoids Screen   


 


Complement C3   


 


Complement C4   


 


Hep Bs Antigen   


 


Hep Bs Antibody   


 


Hep B Core IgM Ab   


 


Hepatitis C Antibody   


 


HIV 1&2 Antibody Screen   


 


Blood Type   


 


Blood Type Confirm   


 


Antibody Screen   


 


Crossmatch   


 


BBK History Checked   














  02/06/19 02/06/19 02/06/19





  06:00 06:00 09:00


 


WBC   


 


RBC   


 


Hgb   


 


Hct   


 


MCV   


 


MCH   


 


MCHC   


 


RDW   


 


Plt Count   


 


MPV   


 


Neut % (Auto)   


 


Lymph % (Auto)   


 


Mono % (Auto)   


 


Eos % (Auto)   


 


Baso % (Auto)   


 


Lymph # (Auto)   


 


Mono # (Auto)   


 


Eos # (Auto)   


 


Baso # (Auto)   


 


Absolute Neuts (auto)   


 


PT   15.0 H 


 


INR   1.33 


 


APTT   33.8 


 


Sodium   


 


Potassium   


 


Chloride   


 


Carbon Dioxide   


 


Anion Gap   


 


BUN   


 


Creatinine   


 


Est GFR ( Amer)   


 


Est GFR (Non-Af Amer)   


 


Random Glucose   


 


Calcium   


 


Phosphorus   


 


Magnesium   


 


Transferrin   


 


Ferritin   


 


Total Bilirubin   


 


AST   


 


ALT   


 


Alkaline Phosphatase   


 


Troponin I   


 


Total Protein   


 


Albumin   


 


Globulin   


 


Albumin/Globulin Ratio   


 


Triglycerides   


 


Cholesterol   


 


LDL Cholesterol Direct   


 


HDL Cholesterol   


 


Vitamin B12   


 


TSH 3rd Generation  3.04  


 


Urine Eosinophils   


 


Ur Random Creatinine   


 


U Random Total Protein   


 


Ur Random Sodium   


 


Urine Total Volume   


 


Microalb/Creat Ratio   


 


Urine Opiates Screen   


 


Urine Methadone Screen   


 


Ur Barbiturates Screen   


 


Ur Phencyclidine Scrn   


 


Ur Amphetamines Screen   


 


U Benzodiazepines Scrn   


 


U Oth Cocaine Metabols   


 


U Cannabinoids Screen   


 


Complement C3   


 


Complement C4   


 


Hep Bs Antigen   


 


Hep Bs Antibody   


 


Hep B Core IgM Ab   


 


Hepatitis C Antibody   


 


HIV 1&2 Antibody Screen   


 


Blood Type    A POSITIVE


 


Blood Type Confirm   


 


Antibody Screen    Negative


 


Crossmatch    See Detail


 


BBK History Checked    No verified bt














  02/06/19





  09:43


 


WBC 


 


RBC 


 


Hgb 


 


Hct 


 


MCV 


 


MCH 


 


MCHC 


 


RDW 


 


Plt Count 


 


MPV 


 


Neut % (Auto) 


 


Lymph % (Auto) 


 


Mono % (Auto) 


 


Eos % (Auto) 


 


Baso % (Auto) 


 


Lymph # (Auto) 


 


Mono # (Auto) 


 


Eos # (Auto) 


 


Baso # (Auto) 


 


Absolute Neuts (auto) 


 


PT 


 


INR 


 


APTT 


 


Sodium 


 


Potassium 


 


Chloride 


 


Carbon Dioxide 


 


Anion Gap 


 


BUN 


 


Creatinine 


 


Est GFR ( Amer) 


 


Est GFR (Non-Af Amer) 


 


Random Glucose 


 


Calcium 


 


Phosphorus 


 


Magnesium 


 


Transferrin 


 


Ferritin 


 


Total Bilirubin 


 


AST 


 


ALT 


 


Alkaline Phosphatase 


 


Troponin I 


 


Total Protein 


 


Albumin 


 


Globulin 


 


Albumin/Globulin Ratio 


 


Triglycerides 


 


Cholesterol 


 


LDL Cholesterol Direct 


 


HDL Cholesterol 


 


Vitamin B12 


 


TSH 3rd Generation 


 


Urine Eosinophils 


 


Ur Random Creatinine 


 


U Random Total Protein 


 


Ur Random Sodium 


 


Urine Total Volume 


 


Microalb/Creat Ratio 


 


Urine Opiates Screen 


 


Urine Methadone Screen 


 


Ur Barbiturates Screen 


 


Ur Phencyclidine Scrn 


 


Ur Amphetamines Screen 


 


U Benzodiazepines Scrn 


 


U Oth Cocaine Metabols 


 


U Cannabinoids Screen 


 


Complement C3 


 


Complement C4 


 


Hep Bs Antigen 


 


Hep Bs Antibody 


 


Hep B Core IgM Ab 


 


Hepatitis C Antibody 


 


HIV 1&2 Antibody Screen 


 


Blood Type 


 


Blood Type Confirm  A POSITIVE


 


Antibody Screen 


 


Crossmatch 


 


BBK History Checked 











Radiology Impressions: 


                              Radiology Impressions





Extremity Ultrasound  02/04/19 15:03


IMPRESSION:


No sonographic evidence for deep venous thrombosis in the visualized 


segments of both lower extremities. 


 


 








Renal Ultrasound  02/05/19 09:28


IMPRESSION:


11 mm nonobstructing calculus lower pole left kidney.  Otherwise 


unremarkable.


 


 








Abdomen/Pelvis CT  02/05/19 10:24


IMPRESSION:


Obstructing 9 mm mid left ureteral calculus with mild left 


hydronephrosis 8 mm nonobstructing left lower pole renal calculus. 


Minor findings as above. Small bilateral pleural effusion, right 


greater than left. 


 


 








Chest X-Ray  02/05/19 12:34


IMPRESSION:


New right tunneled central venous dialysis catheter otherwise no 


significant change.  


 


 











EKG/Cardiology Studies: 


Cardiology / EKG Studies





02/07/19 06:00


EKG [ELECTROCARDIOGRAM] Routine 


   Comment: 


   Reason For Exam: dyspnea














Critical Care Progress Note





- Nutrition


Nutrition: 


                                    Nutrition











 Category Date Time Status


 


 Renal Diet [DIET] Diets  02/05/19 Dinner Ordered














Attending/Attestation





- Attestation


I have personally seen and examined this patient.: Yes


I have fully participated in the care of the patient.: Yes


I have reviewed all pertinent clinical information: Yes


Notes (Text): 





02/06/19 11:09


The patient was seen and examined at the bedside. 


Patient care was discussed with resident 


Medical records, lab studies were reviewed and management issues were discussed 

and formulated.


Agree with above treatment plans as outlined in 's note with addition 

of the following:











ARF on HD \ Nephrolithiasis \ UTI \ Sepsis \ NSTEMI \ Anemia Acute on chronic 

diastolic CHF \Anemia





-hemodynamic monitoring to maintain MAP>65


-f\u serial CE (trop 2.23) and ECG; cardiology team f\u


-continue ASA, will hold if Hb continues to drop or sign of active bleed


-O2 supplementation to maintain spo2>90 Pao2>60; comfortable on NC


-broad spectrum Abx as per ID team; f\u cultures


-f\u Bun\Cr and U\o; HD and volume removal as per renal team; increase K+ bath 


-PO diet and aspiration precautions


-transfuse 2 PRBC; anemia w\u; monitor serial H\H and for bleeding


-consider GI team eval


-hold heparin


-urology team f\u 


-DVT \ PUD  prophylaxis








CCM time 33mins

## 2019-02-07 LAB
ALBUMIN (PEP): 2.7 G/DL (ref 3.8–4.8)
ALBUMIN SERPL-MCNC: 3.1 G/DL (ref 3–4.8)
ALBUMIN/GLOB SERPL: 1 {RATIO} (ref 1.1–1.8)
ALPHA-1-GLOBULIN (PEP): 0.5 G/DL (ref 0.2–0.3)
ALT SERPL-CCNC: 41 U/L (ref 7–56)
ARTERIAL BLOOD GAS HEMOGLOBIN: 9.1 G/DL (ref 11.7–17.4)
ARTERIAL BLOOD GAS O2 SAT: 96.7 % (ref 95–98)
ARTERIAL BLOOD GAS PCO2: 34 MM/HG (ref 35–45)
ARTERIAL BLOOD GAS TCO2: 21.1 MMOL.L (ref 22–28)
AST SERPL-CCNC: 45 U/L (ref 17–59)
BASOPHILS # BLD AUTO: 0.01 K/MM3 (ref 0–2)
BASOPHILS NFR BLD: 0.1 % (ref 0–3)
BUN SERPL-MCNC: 130 MG/DL (ref 7–21)
CALCIUM SERPL-MCNC: 6.3 MG/DL (ref 8.4–10.5)
EOSINOPHIL # BLD: 0 10*3/UL (ref 0–0.7)
EOSINOPHIL NFR BLD: 0.4 % (ref 1.5–5)
ERYTHROCYTE [DISTWIDTH] IN BLOOD BY AUTOMATED COUNT: 14.3 % (ref 11.5–14.5)
ERYTHROCYTE [DISTWIDTH] IN BLOOD BY AUTOMATED COUNT: 14.5 % (ref 11.5–14.5)
GFR NON-AFRICAN AMERICAN: 5
HCO3 BLDA-SCNC: 20.1 MMOL/L (ref 21–28)
HGB BLD-MCNC: 8.3 G/DL (ref 14–18)
HGB BLD-MCNC: 8.8 G/DL (ref 14–18)
INHALED O2 CONCENTRATION: 28 %
LYMPHOCYTES # BLD: 1.6 10*3/UL (ref 1.2–3.4)
LYMPHOCYTES NFR BLD AUTO: 15.9 % (ref 22–35)
MCH RBC QN AUTO: 28.3 PG (ref 25–35)
MCH RBC QN AUTO: 28.7 PG (ref 25–35)
MCHC RBC AUTO-ENTMCNC: 34.5 G/DL (ref 31–37)
MCHC RBC AUTO-ENTMCNC: 35.2 G/DL (ref 31–37)
MCV RBC AUTO: 81.7 FL (ref 80–105)
MCV RBC AUTO: 82 FL (ref 80–105)
MONOCYTES # BLD AUTO: 0.3 10*3/UL (ref 0.1–0.6)
MONOCYTES NFR BLD: 3.2 % (ref 1–6)
O2 CAP BLDA-SCNC: 12.7 ML/DL (ref 16–24)
O2 CT BLDA-SCNC: 12.3 ML/DL (ref 15–23)
PH BLDA: 7.38 [PH] (ref 7.35–7.45)
PLATELET # BLD: 158 10^3/UL (ref 120–450)
PLATELET # BLD: 166 10^3/UL (ref 120–450)
PMV BLD AUTO: 9 FL (ref 7–11)
PMV BLD AUTO: 9.6 FL (ref 7–11)
PO2 BLDA: 106 MM/HG (ref 80–100)
RBC # BLD AUTO: 2.89 10^6/UL (ref 3.5–6.1)
RBC # BLD AUTO: 3.11 10^6/UL (ref 3.5–6.1)
URATE SERPL-MCNC: 7.8 MG/DL (ref 3.5–8.5)
WBC # BLD AUTO: 10.1 10^3/UL (ref 4.5–11)
WBC # BLD AUTO: 10.6 10^3/UL (ref 4.5–11)

## 2019-02-07 PROCEDURE — BT141ZZ FLUOROSCOPY OF KIDNEYS, URETERS AND BLADDER USING LOW OSMOLAR CONTRAST: ICD-10-PCS | Performed by: UROLOGY

## 2019-02-07 PROCEDURE — 5A1D70Z PERFORMANCE OF URINARY FILTRATION, INTERMITTENT, LESS THAN 6 HOURS PER DAY: ICD-10-PCS

## 2019-02-07 PROCEDURE — 0T9B80Z DRAINAGE OF BLADDER WITH DRAINAGE DEVICE, VIA NATURAL OR ARTIFICIAL OPENING ENDOSCOPIC: ICD-10-PCS | Performed by: UROLOGY

## 2019-02-07 PROCEDURE — 0T778DZ DILATION OF LEFT URETER WITH INTRALUMINAL DEVICE, VIA NATURAL OR ARTIFICIAL OPENING ENDOSCOPIC: ICD-10-PCS | Performed by: UROLOGY

## 2019-02-07 RX ADMIN — NYSTATIN SCH: 100000 CREAM TOPICAL at 16:23

## 2019-02-07 RX ADMIN — Medication SCH: at 16:23

## 2019-02-07 RX ADMIN — METOPROLOL SUCCINATE SCH: 25 TABLET, EXTENDED RELEASE ORAL at 16:25

## 2019-02-07 RX ADMIN — CEFEPIME SCH MLS/HR: 1 INJECTION, SOLUTION INTRAVENOUS at 16:40

## 2019-02-07 RX ADMIN — NYSTATIN SCH APPLIC: 100000 CREAM TOPICAL at 09:26

## 2019-02-07 RX ADMIN — CEFEPIME SCH: 1 INJECTION, SOLUTION INTRAVENOUS at 20:40

## 2019-02-07 NOTE — CP.[email or web address].PCO
Physician Communication Note





- Physician Communication Note


Physician Communication Note: patient for cysto w. renal stent placement per 

Rene

## 2019-02-07 NOTE — PN
SUBJECTIVE:  

The patient was seen and examined at the bedside in the ICU.  No acute events 
overnight.  He remains afebrile and hemodynamically stable. He is s/p 
transfusion of 2 units of PRBCs with an appropriate response in Hb.  This 
morning he feels ok and offers no complaints.



OBJECTIVE:

VITAL SIGNS:  Temperature 97.7, pulse 72, blood pressure 123/60, respiratory 
rate 16, oxygen saturation 97% on 2L NC.

GENERAL:  Obese man, lying in bed, in no apparent distress.

HEENT:  PERRL, EOMI.  No scleral icterus.  Conjunctival pallor is noted.

NECK:  No JVD.

LUNGS:  Decreased breath sounds at the bases.

CARDIOVASCULAR:  Regular rate and rhythm.  Normal S1 and S2.  Grade II/VI murmur
to LLSB.  No friction rub.

ABDOMEN:  Normoactive bowel sounds, soft, nontender, nondistended.

EXTREMITIES:  Trace pedal edema bilaterally.

NEUROLOGIC:  Awake, alert and oriented x 3.  No focal motor deficits.



LABORATORY DATA:  

WBC 10 with 80% neutrophils, hemoglobin 8.3, hematocrit 24, platelets 166.  

Sodium 137, potassium 3.2, chloride 100, bicarb 21, , creatinine 10, 
glucose 126.

Uric acid 7.8.  

Blood cultures with no growth to date.



ASSESSMENT:  

The patient is a 68-year-old man with no significant past medical history who 
presented with a 2 week history of dyspnea with exertion, pedal edema, 3-pillow 
orthopnea and decreased urinary output and was admitted to the ICU for 
management of ROXANE on CKD stage III, anion gap metabolic acidosis, SIRS and 
anemia of unclear etiology.



PLAN:

1.  ROXANE on CKD stage III.  Input from Dr. Cardoso noted.  The patient remains on 
renal replacement therapy.  Workup remains ongoing to further investigate the 
etiology of his acute kidney injury.  Continue with care as per Dr. Cardoso.  
Continue to monitor strict I&O's, renally dose medications and avoid 
nephrotoxins.

2.  Elevated troponin, consider secondary to impaired renal clearance, less 
likely NSTEMI.  Input from Dr. Garay noted.  The patient remains chest pain 
free.  Continue with care as per Dr. Garay.

3.  Anion gap metabolic acidosis, consider secondary to profound uremia in the 
setting of ROXANE, resolved.  Input from Dr. Cardoso noted.  Continue with care as 
above.

4.  Obstructive nephrolithiasis.  Input from Dr. López noted and arrangements 
will be made for intraureteral stent placement.

5.  SIRS syndrome.  Input from Dr. Lloyd noted.  Blood cultures remain negative.
 Continue with antimicrobials as per Dr. Lloyd.

6.  Normocytic anemia.  The patient is s/p transfusion of 2 units of PRBCs.  
Input from Dr. Soria noted and the patient has been started on IV Protonix.  
Stool occult blood is pending.  We will continue to monitor CBC and transfuse as
needed.

7.  Elevated BNP, etiology likely secondary to underlying renal dysfunction.  
TTE demonstrates no wall motion abnormality or valvular dysfunction.  Continue 
with care as per Dr. Garay.

8.  Prophylaxis.  Continue Protonix for GI prophylaxis and Heparin for DVT 
prophylaxis.





CODE STATUS:  Full code.





__________________________________________

Anatoliy Osman MD





DD:  02/07/2019 9:26:24

DT:  02/07/2019 9:29:17

Job # [id number]



[email or web address]

## 2019-02-07 NOTE — CARD
--------------- APPROVED REPORT --------------





Date of service: 02/07/2019



EKG Measurement

Heart [id number]

VA [id number]

[id number]

[id number]

[id number]



<Conclusion>

Sinus rhythm with 1st degree AV block

Incomplete right bundle branch block

Nonspecific ST and T wave abnormality

Abnormal ECG

## 2019-02-07 NOTE — CP.CCUPN
<Anastasia Tyler - Last Filed: 02/07/19 10:53>





CCU Subjective





- Physician Review


Subjective (Free Text): 


Anastasia Tyler, PGY-1 ICU Progress Note for Dr. Paulino





Patient seen and evaluated at bedside. No acute overnight events. Patient 

reports no chest pain and shortness of breath. Patient denies headache, fever, 

heart palpitations, left arm pain, jaw pain, nausea, vomiting, constipation, 

abdominal pain, dysuria, hematuria. 12-point ROS was negative except for what 

was mentioned above.











CCU Objective





- Vital Signs / Intake & Output


Vital Signs (Last 4 hours): 


Vital Signs











  Pulse Resp BP Pulse Ox


 


 02/07/19 06:20  72  12  123/60  97


 


 02/07/19 06:00  72   117/61  97


 


 02/07/19 05:40  72   120/59 L  97











Intake and Output (Last 8hrs): 


                                 Intake & Output











 02/06/19 02/07/19 02/07/19





 22:59 06:59 14:59


 


Intake Total 600  


 


Output Total 350  


 


Balance 250  


 


Intake:   


 


    


 


    antibiotics 100  


 


    bicarb 200  


 


  Oral 300  


 


Output:   


 


  Urine 350  


 


    Urethral (Jovel) 350  


 


Other:   


 


  # Bowel Movements 0  














- Physical Exam


Head: Positive for: Atraumatic, Normocephalic


Pupils: Positive for: PERRL


Extroacular Muscles: Positive for: EOMI


Conjunctiva: Positive for: Normal


Mouth: Positive for: Dry


Pharnyx: Positive for: Normal


Respiratory/Chest: Positive for: Wheezes (faint expiratory wheezes heard in 

anterior lung fields), Tachypneic.  Negative for: Good Air Exchange, Respiratory

Distress, Retracting


Abdomen: Positive for: Normal Bowel Sounds.  Negative for: Tenderness, 

Distention, Peritoneal Signs


Lower Extremity: Positive for: Edema, Capillary Refill < 2 s, Other (Thickened 

skin noted to )


Neurological: Positive for: GCS=15, Speech Normal (able to speak in full 

sentences)


Skin: Positive for: Warm, Dry, Induration ( Keratotic grey lesion noted 

circumferentially to RLE. ).  Negative for: Rashes


Psychiatric: Positive for: Alert, Oriented x 3, Normal Insight, Normal 

Concentration





- Medications


Active Medications: 


Active Medications











Generic Name Dose Route Start Last Admin





  Trade Name Freq  PRN Reason Stop Dose Admin


 


Albuterol/Ipratropium  3 ml  02/04/19 17:04  





  Duoneb 3 Mg/0.5 Mg (3 Ml) Ud  IH   





  Q2H PRN   





  Shortness of Breath   





     





     





     


 


Aspirin  81 mg  02/05/19 10:00  02/06/19 12:45





  Aspirin Chewable  PO   Not Given





  DAILY [facility]   





     





     





     





     


 


Atorvastatin Calcium  40 mg  02/05/19 17:00  02/06/19 17:47





  Lipitor  PO   40 mg





  DIN BRENT   Administration





     





     





     





     


 


Calcium Acetate  2,001 mg  02/05/19 12:00  02/06/19 17:47





  Phoslo  PO   2,001 mg





  WM BRENT   Administration





     





     





     





     


 


Darbepoetin Balta  100 mcg  02/05/19 10:00  02/05/19 13:50





  Aranesp  IVP   100 mcg





  QWK BRENT   Administration





     





     





     





     


 


Ergocalciferol  1 cap  02/06/19 14:30  02/06/19 17:47





  Drisdol 50,000 Intl Units Cap  PO   1 cap





  Q7D BRENT   Administration





     





     





     





     


 


Cefepime HCl  1 gm in 100 mls @ 100 mls/hr  02/04/19 18:45  02/06/19 17:48





  Maxipime 1gm  IVPB   100 mls/hr





  Q24H BRENT   Administration





     





     





  Protocol   





     


 


Metoprolol Succinate  25 mg  02/06/19 10:30  02/06/19 13:47





  Toprol Xl  PO   25 mg





  BRK BRENT   Administration





     





     





     





     


 


Nystatin  0 ea  02/05/19 14:00  02/06/19 18:08





  Mycostatin Cream  TOP   1 applic





  TID BRENT   Administration





     





     





     





     


 


Pantoprazole Sodium  40 mg  02/05/19 10:00  02/06/19 09:32





  Protonix Inj  IVP   40 mg





  DAILY BRENT   Administration





     





     





     





     


 


Tamsulosin HCl  0.4 mg  02/05/19 12:30  02/06/19 12:45





  Flomax  PO   Not Given





  DAILY [facility]   





     





     





     





     


 


Vitamin B Complex/Vit C/Folic Acid  1 tab  02/06/19 08:00  02/06/19 12:46





  Nephro-Seda  PO   Not Given





  0800 BRENT   





     





     





     





     














- Patient Studies


Lab Studies: 


                              Microbiology Studies











 02/04/19 15:00 Blood Culture - Preliminary





 Blood-Venous    NO GROWTH AFTER 48 HOURS


 


 02/04/19 14:30 Blood Culture - Preliminary





 Blood-Venous    NO GROWTH AFTER 48 HOURS


 


 02/04/19 20:18 MRSA Culture (Admit) - Final





 Naris    MRSA NOT DETECTED








                                   Lab Studies











  02/07/19 02/07/19 02/07/19 Range/Units





  05:00 03:15 03:15 


 


WBC   10.1  D   (4.5-11.0)  10^3/uL


 


RBC   2.89 L   (3.5-6.1)  10^6/uL


 


Hgb   8.3 L   (14.0-18.0)  g/dL


 


Hct   23.6 L   (42.0-52.0)  %


 


MCV   81.7   (80.0-105.0)  fl


 


MCH   28.7   (25.0-35.0)  pg


 


MCHC   35.2   (31.0-37.0)  g/dl


 


RDW   14.3   (11.5-14.5)  %


 


Plt Count   166   (120.0-450.0)  10^3/uL


 


MPV   9.6   (7.0-11.0)  fl


 


Neut % (Auto)   80.4 H   (50.0-68.0)  %


 


Lymph % (Auto)   15.9 L   (22.0-35.0)  %


 


Mono % (Auto)   3.2   (1.0-6.0)  %


 


Eos % (Auto)   0.4 L   (1.5-5.0)  %


 


Baso % (Auto)   0.1   (0.0-3.0)  %


 


Lymph # (Auto)   1.6   (1.2-3.4)  


 


Mono # (Auto)   0.3   (0.1-0.6)  


 


Eos # (Auto)   0.0   (0.0-0.7)  


 


Baso # (Auto)   0.01   (0.0-2.0)  K/mm3


 


Absolute Neuts (auto)   8.14 H   (1.4-6.5)  


 


pCO2  34 L    (35-45)  mm/Hg


 


pO2  106.0 H    ()  mm/Hg


 


HCO3  20.1 L    (21-28)  mmol/L


 


ABG pH  7.38    (7.35-7.45)  


 


ABG Total CO2  21.1 L    (22-28)  mmol.L


 


ABG O2 Saturation  96.7    (95-98)  %


 


ABG O2 Content  12.3 L    (15-23)  ML/dl


 


ABG Base Excess  -4.5 L    (-2.0-3.0)  mmol/L


 


ABG Hemoglobin  9.1 L    (11.7-17.4)  g/dL


 


ABG Carboxyhemoglobin  0.6    (0.5-1.5)  %


 


POC ABG HHb (Measured)  3.2    (0-5)  %


 


ABG Methemoglobin  1.3    (0.0-3.0)  %


 


ABG O2 Capacity  12.7 L    (16-24)  mL/dl


 


Hgb O2 Saturation  94.8 L    (95.0-98.0)  %


 


FiO2  28.0    %


 


Sodium    137  (132-148)  mmol/L


 


Potassium    3.2 L  (3.6-5.0)  mmol/L


 


Chloride    100  ()  mmol/L


 


Carbon Dioxide    21  (21-33)  mmol/L


 


Anion Gap    19  (10-20)  


 


BUN    130 H*  (7-21)  mg/dL


 


Creatinine    10.0 H*  (0.8-1.5)  mg/dl


 


Est GFR (African Amer)    6  


 


Est GFR (Non-Af Amer)    5  


 


Random Glucose    126 H  ()  mg/dL


 


Uric Acid    7.8  (3.5-8.5)  mg/dL


 


Calcium    6.3 L*  (8.4-10.5)  mg/dL


 


Total Bilirubin    0.6  (0.2-1.3)  mg/dL


 


AST    45  (17-59)  U/L


 


ALT    41  (7-56)  U/L


 


Alkaline Phosphatase    79  ()  U/L


 


Troponin I     ng/mL


 


Total Protein    6.2  (5.8-8.3)  g/dL


 


Total Protein (PEP)     (6.1-8.1)  g/dL


 


Albumin    3.1  (3.0-4.8)  g/dL


 


Globulin    3.1  gm/dL


 


Albumin/Globulin Ratio    1.0 L  (1.1-1.8)  


 


25-OH Vitamin D Total     (30.0-100.0)  NG/ML


 


PTH Intact Whole Molec     (14-64)  pg/mL


 


GLENN Screen     (Negative)  


 


Double Strand DNA Ab     IU/mL


 


Blood Type     


 


Blood Type Confirm     


 


Antibody Screen     


 


Crossmatch     


 


BBK History Checked     














  02/06/19 02/06/19 02/06/19 Range/Units





  19:23 19:23 09:43 


 


WBC  13.4 H D    (4.5-11.0)  10^3/uL


 


RBC  3.08 L    (3.5-6.1)  10^6/uL


 


Hgb  8.8 L D    (14.0-18.0)  g/dL


 


Hct  25.1 L    (42.0-52.0)  %


 


MCV  81.5    (80.0-105.0)  fl


 


MCH  28.6    (25.0-35.0)  pg


 


MCHC  35.1    (31.0-37.0)  g/dl


 


RDW  14.3    (11.5-14.5)  %


 


Plt Count  182    (120.0-450.0)  10^3/uL


 


MPV  9.1    (7.0-11.0)  fl


 


Neut % (Auto)     (50.0-68.0)  %


 


Lymph % (Auto)     (22.0-35.0)  %


 


Mono % (Auto)  3.1    (1.0-6.0)  %


 


Eos % (Auto)  0.1 L    (1.5-5.0)  %


 


Baso % (Auto)  0.1    (0.0-3.0)  %


 


Lymph # (Auto)     (1.2-3.4)  


 


Mono # (Auto)  0.4    (0.1-0.6)  


 


Eos # (Auto)  0.0    (0.0-0.7)  


 


Baso # (Auto)  0.01    (0.0-2.0)  K/mm3


 


Absolute Neuts (auto)     (1.4-6.5)  


 


pCO2     (35-45)  mm/Hg


 


pO2     ()  mm/Hg


 


HCO3     (21-28)  mmol/L


 


ABG pH     (7.35-7.45)  


 


ABG Total CO2     (22-28)  mmol.L


 


ABG O2 Saturation     (95-98)  %


 


ABG O2 Content     (15-23)  ML/dl


 


ABG Base Excess     (-2.0-3.0)  mmol/L


 


ABG Hemoglobin     (11.7-17.4)  g/dL


 


ABG Carboxyhemoglobin     (0.5-1.5)  %


 


POC ABG HHb (Measured)     (0-5)  %


 


ABG Methemoglobin     (0.0-3.0)  %


 


ABG O2 Capacity     (16-24)  mL/dl


 


Hgb O2 Saturation     (95.0-98.0)  %


 


FiO2     %


 


Sodium   137   (132-148)  mmol/L


 


Potassium   2.9 L*   (3.6-5.0)  mmol/L


 


Chloride   100   ()  mmol/L


 


Carbon Dioxide   20 L   (21-33)  mmol/L


 


Anion Gap   19   (10-20)  


 


BUN   126 H*   (7-21)  mg/dL


 


Creatinine   9.0 H* D   (0.8-1.5)  mg/dl


 


Est GFR ( Amer)   7   


 


Est GFR (Non-Af Amer)   6   


 


Random Glucose   125 H   ()  mg/dL


 


Uric Acid     (3.5-8.5)  mg/dL


 


Calcium   6.6 L*   (8.4-10.5)  mg/dL


 


Total Bilirubin   0.6   (0.2-1.3)  mg/dL


 


AST   40   (17-59)  U/L


 


ALT   39   (7-56)  U/L


 


Alkaline Phosphatase   82   ()  U/L


 


Troponin I   1.75 H* D   ng/mL


 


Total Protein   6.6   (5.8-8.3)  g/dL


 


Total Protein (PEP)     (6.1-8.1)  g/dL


 


Albumin   3.3   (3.0-4.8)  g/dL


 


Globulin   3.3   gm/dL


 


Albumin/Globulin Ratio   1.0 L   (1.1-1.8)  


 


25-OH Vitamin D Total     (30.0-100.0)  NG/ML


 


PTH Intact Whole Molec     (14-64)  pg/mL


 


GLENN Screen     (Negative)  


 


Double Strand DNA Ab     IU/mL


 


Blood Type     


 


Blood Type Confirm    A POSITIVE  


 


Antibody Screen     


 


Crossmatch     


 


BBK History Checked     














  02/06/19 02/06/19 02/06/19 Range/Units





  09:00 06:00 06:00 


 


WBC     (4.5-11.0)  10^3/uL


 


RBC     (3.5-6.1)  10^6/uL


 


Hgb     (14.0-18.0)  g/dL


 


Hct     (42.0-52.0)  %


 


MCV     (80.0-105.0)  fl


 


MCH     (25.0-35.0)  pg


 


MCHC     (31.0-37.0)  g/dl


 


RDW     (11.5-14.5)  %


 


Plt Count     (120.0-450.0)  10^3/uL


 


MPV     (7.0-11.0)  fl


 


Neut % (Auto)     (50.0-68.0)  %


 


Lymph % (Auto)     (22.0-35.0)  %


 


Mono % (Auto)     (1.0-6.0)  %


 


Eos % (Auto)     (1.5-5.0)  %


 


Baso % (Auto)     (0.0-3.0)  %


 


Lymph # (Auto)     (1.2-3.4)  


 


Mono # (Auto)     (0.1-0.6)  


 


Eos # (Auto)     (0.0-0.7)  


 


Baso # (Auto)     (0.0-2.0)  K/mm3


 


Absolute Neuts (auto)     (1.4-6.5)  


 


pCO2     (35-45)  mm/Hg


 


pO2     ()  mm/Hg


 


HCO3     (21-28)  mmol/L


 


ABG pH     (7.35-7.45)  


 


ABG Total CO2     (22-28)  mmol.L


 


ABG O2 Saturation     (95-98)  %


 


ABG O2 Content     (15-23)  ML/dl


 


ABG Base Excess     (-2.0-3.0)  mmol/L


 


ABG Hemoglobin     (11.7-17.4)  g/dL


 


ABG Carboxyhemoglobin     (0.5-1.5)  %


 


POC ABG HHb (Measured)     (0-5)  %


 


ABG Methemoglobin     (0.0-3.0)  %


 


ABG O2 Capacity     (16-24)  mL/dl


 


Hgb O2 Saturation     (95.0-98.0)  %


 


FiO2     %


 


Sodium     (132-148)  mmol/L


 


Potassium     (3.6-5.0)  mmol/L


 


Chloride     ()  mmol/L


 


Carbon Dioxide     (21-33)  mmol/L


 


Anion Gap     (10-20)  


 


BUN     (7-21)  mg/dL


 


Creatinine     (0.8-1.5)  mg/dl


 


Est GFR (African Amer)     


 


Est GFR (Non-Af Amer)     


 


Random Glucose     ()  mg/dL


 


Uric Acid     (3.5-8.5)  mg/dL


 


Calcium     (8.4-10.5)  mg/dL


 


Total Bilirubin     (0.2-1.3)  mg/dL


 


AST     (17-59)  U/L


 


ALT     (7-56)  U/L


 


Alkaline Phosphatase     ()  U/L


 


Troponin I     ng/mL


 


Total Protein     (5.8-8.3)  g/dL


 


Total Protein (PEP)    5.5 L  (6.1-8.1)  g/dL


 


Albumin     (3.0-4.8)  g/dL


 


Globulin     gm/dL


 


Albumin/Globulin Ratio     (1.1-1.8)  


 


25-OH Vitamin D Total   < 12.8 L   (30.0-100.0)  NG/ML


 


PTH Intact Whole Molec     (14-64)  pg/mL


 


GLENN Screen     (Negative)  


 


Double Strand DNA Ab     IU/mL


 


Blood Type  A POSITIVE    


 


Blood Type Confirm     


 


Antibody Screen  Negative    


 


Crossmatch  See Detail    


 


BBK History Checked  No verified bt    














  02/05/19 02/05/19 Range/Units





  10:10 10:10 


 


WBC    (4.5-11.0)  10^3/uL


 


RBC    (3.5-6.1)  10^6/uL


 


Hgb    (14.0-18.0)  g/dL


 


Hct    (42.0-52.0)  %


 


MCV    (80.0-105.0)  fl


 


MCH    (25.0-35.0)  pg


 


MCHC    (31.0-37.0)  g/dl


 


RDW    (11.5-14.5)  %


 


Plt Count    (120.0-450.0)  10^3/uL


 


MPV    (7.0-11.0)  fl


 


Neut % (Auto)    (50.0-68.0)  %


 


Lymph % (Auto)    (22.0-35.0)  %


 


Mono % (Auto)    (1.0-6.0)  %


 


Eos % (Auto)    (1.5-5.0)  %


 


Baso % (Auto)    (0.0-3.0)  %


 


Lymph # (Auto)    (1.2-3.4)  


 


Mono # (Auto)    (0.1-0.6)  


 


Eos # (Auto)    (0.0-0.7)  


 


Baso # (Auto)    (0.0-2.0)  K/mm3


 


Absolute Neuts (auto)    (1.4-6.5)  


 


pCO2    (35-45)  mm/Hg


 


pO2    ()  mm/Hg


 


HCO3    (21-28)  mmol/L


 


ABG pH    (7.35-7.45)  


 


ABG Total CO2    (22-28)  mmol.L


 


ABG O2 Saturation    (95-98)  %


 


ABG O2 Content    (15-23)  ML/dl


 


ABG Base Excess    (-2.0-3.0)  mmol/L


 


ABG Hemoglobin    (11.7-17.4)  g/dL


 


ABG Carboxyhemoglobin    (0.5-1.5)  %


 


POC ABG HHb (Measured)    (0-5)  %


 


ABG Methemoglobin    (0.0-3.0)  %


 


ABG O2 Capacity    (16-24)  mL/dl


 


Hgb O2 Saturation    (95.0-98.0)  %


 


FiO2    %


 


Sodium    (132-148)  mmol/L


 


Potassium    (3.6-5.0)  mmol/L


 


Chloride    ()  mmol/L


 


Carbon Dioxide    (21-33)  mmol/L


 


Anion Gap    (10-20)  


 


BUN    (7-21)  mg/dL


 


Creatinine    (0.8-1.5)  mg/dl


 


Est GFR (African Amer)    


 


Est GFR (Non-Af Amer)    


 


Random Glucose    ()  mg/dL


 


Uric Acid    (3.5-8.5)  mg/dL


 


Calcium    (8.4-10.5)  mg/dL


 


Total Bilirubin    (0.2-1.3)  mg/dL


 


AST    (17-59)  U/L


 


ALT    (7-56)  U/L


 


Alkaline Phosphatase    ()  U/L


 


Troponin I    ng/mL


 


Total Protein    (5.8-8.3)  g/dL


 


Total Protein (PEP)    (6.1-8.1)  g/dL


 


Albumin    (3.0-4.8)  g/dL


 


Globulin    gm/dL


 


Albumin/Globulin Ratio    (1.1-1.8)  


 


25-OH Vitamin D Total    (30.0-100.0)  NG/ML


 


PTH Intact Whole Molec  620 H   (14-64)  pg/mL


 


GLENN Screen   Negative  (Negative)  


 


Double Strand DNA Ab   <1  IU/mL


 


Blood Type    


 


Blood Type Confirm    


 


Antibody Screen    


 


Crossmatch    


 


BBK History Checked    








                         Laboratory Results - last 24 hr











  02/05/19 02/05/19 02/06/19





  10:10 10:10 06:00


 


WBC   


 


RBC   


 


Hgb   


 


Hct   


 


MCV   


 


MCH   


 


MCHC   


 


RDW   


 


Plt Count   


 


MPV   


 


Neut % (Auto)   


 


Lymph % (Auto)   


 


Mono % (Auto)   


 


Eos % (Auto)   


 


Baso % (Auto)   


 


Lymph # (Auto)   


 


Mono # (Auto)   


 


Eos # (Auto)   


 


Baso # (Auto)   


 


Absolute Neuts (auto)   


 


pCO2   


 


pO2   


 


HCO3   


 


ABG pH   


 


ABG Total CO2   


 


ABG O2 Saturation   


 


ABG O2 Content   


 


ABG Base Excess   


 


ABG Hemoglobin   


 


ABG Carboxyhemoglobin   


 


POC ABG HHb (Measured)   


 


ABG Methemoglobin   


 


ABG O2 Capacity   


 


Hgb O2 Saturation   


 


FiO2   


 


Sodium   


 


Potassium   


 


Chloride   


 


Carbon Dioxide   


 


Anion Gap   


 


BUN   


 


Creatinine   


 


Est GFR ( Amer)   


 


Est GFR (Non-Af Amer)   


 


Random Glucose   


 


Uric Acid   


 


Calcium   


 


Total Bilirubin   


 


AST   


 


ALT   


 


Alkaline Phosphatase   


 


Troponin I   


 


Total Protein   


 


Total Protein (PEP)    5.5 L


 


Albumin   


 


Globulin   


 


Albumin/Globulin Ratio   


 


25-OH Vitamin D Total   


 


PTH Intact Whole Molec   620 H 


 


GLENN Screen  Negative  


 


Double Strand DNA Ab  <1  


 


Blood Type   


 


Blood Type Confirm   


 


Antibody Screen   


 


Crossmatch   


 


BBK History Checked   














  02/06/19 02/06/19 02/06/19





  06:00 09:00 09:43


 


WBC   


 


RBC   


 


Hgb   


 


Hct   


 


MCV   


 


MCH   


 


MCHC   


 


RDW   


 


Plt Count   


 


MPV   


 


Neut % (Auto)   


 


Lymph % (Auto)   


 


Mono % (Auto)   


 


Eos % (Auto)   


 


Baso % (Auto)   


 


Lymph # (Auto)   


 


Mono # (Auto)   


 


Eos # (Auto)   


 


Baso # (Auto)   


 


Absolute Neuts (auto)   


 


pCO2   


 


pO2   


 


HCO3   


 


ABG pH   


 


ABG Total CO2   


 


ABG O2 Saturation   


 


ABG O2 Content   


 


ABG Base Excess   


 


ABG Hemoglobin   


 


ABG Carboxyhemoglobin   


 


POC ABG HHb (Measured)   


 


ABG Methemoglobin   


 


ABG O2 Capacity   


 


Hgb O2 Saturation   


 


FiO2   


 


Sodium   


 


Potassium   


 


Chloride   


 


Carbon Dioxide   


 


Anion Gap   


 


BUN   


 


Creatinine   


 


Est GFR ( Amer)   


 


Est GFR (Non-Af Amer)   


 


Random Glucose   


 


Uric Acid   


 


Calcium   


 


Total Bilirubin   


 


AST   


 


ALT   


 


Alkaline Phosphatase   


 


Troponin I   


 


Total Protein   


 


Total Protein (PEP)   


 


Albumin   


 


Globulin   


 


Albumin/Globulin Ratio   


 


25-OH Vitamin D Total  < 12.8 L  


 


PTH Intact Whole Molec   


 


GLENN Screen   


 


Double Strand DNA Ab   


 


Blood Type   A POSITIVE 


 


Blood Type Confirm    A POSITIVE


 


Antibody Screen   Negative 


 


Crossmatch   See Detail 


 


BBK History Checked   No verified bt 














  02/06/19 02/06/19 02/07/19





  19:23 19:23 03:15


 


WBC   13.4 H D 


 


RBC   3.08 L 


 


Hgb   8.8 L D 


 


Hct   25.1 L 


 


MCV   81.5 


 


MCH   28.6 


 


MCHC   35.1 


 


RDW   14.3 


 


Plt Count   182 


 


MPV   9.1 


 


Neut % (Auto)   


 


Lymph % (Auto)   


 


Mono % (Auto)   3.1 


 


Eos % (Auto)   0.1 L 


 


Baso % (Auto)   0.1 


 


Lymph # (Auto)   


 


Mono # (Auto)   0.4 


 


Eos # (Auto)   0.0 


 


Baso # (Auto)   0.01 


 


Absolute Neuts (auto)   


 


pCO2   


 


pO2   


 


HCO3   


 


ABG pH   


 


ABG Total CO2   


 


ABG O2 Saturation   


 


ABG O2 Content   


 


ABG Base Excess   


 


ABG Hemoglobin   


 


ABG Carboxyhemoglobin   


 


POC ABG HHb (Measured)   


 


ABG Methemoglobin   


 


ABG O2 Capacity   


 


Hgb O2 Saturation   


 


FiO2   


 


Sodium  137   137


 


Potassium  2.9 L*   3.2 L


 


Chloride  100   100


 


Carbon Dioxide  20 L   21


 


Anion Gap  19   19


 


BUN  126 H*   130 H*


 


Creatinine  9.0 H* D   10.0 H*


 


Est GFR ( Amer)  7   6


 


Est GFR (Non-Af Amer)  6   5


 


Random Glucose  125 H   126 H


 


Uric Acid    7.8


 


Calcium  6.6 L*   6.3 L*


 


Total Bilirubin  0.6   0.6


 


AST  40   45


 


ALT  39   41


 


Alkaline Phosphatase  82   79


 


Troponin I  1.75 H* D  


 


Total Protein  6.6   6.2


 


Total Protein (PEP)   


 


Albumin  3.3   3.1


 


Globulin  3.3   3.1


 


Albumin/Globulin Ratio  1.0 L   1.0 L


 


25-OH Vitamin D Total   


 


PTH Intact Whole Molec   


 


GLENN Screen   


 


Double Strand DNA Ab   


 


Blood Type   


 


Blood Type Confirm   


 


Antibody Screen   


 


Crossmatch   


 


BBK History Checked   














  02/07/19 02/07/19





  03:15 05:00


 


WBC  10.1  D 


 


RBC  2.89 L 


 


Hgb  8.3 L 


 


Hct  23.6 L 


 


MCV  81.7 


 


MCH  28.7 


 


MCHC  35.2 


 


RDW  14.3 


 


Plt Count  166 


 


MPV  9.6 


 


Neut % (Auto)  80.4 H 


 


Lymph % (Auto)  15.9 L 


 


Mono % (Auto)  3.2 


 


Eos % (Auto)  0.4 L 


 


Baso % (Auto)  0.1 


 


Lymph # (Auto)  1.6 


 


Mono # (Auto)  0.3 


 


Eos # (Auto)  0.0 


 


Baso # (Auto)  0.01 


 


Absolute Neuts (auto)  8.14 H 


 


pCO2   34 L


 


pO2   106.0 H


 


HCO3   20.1 L


 


ABG pH   7.38


 


ABG Total CO2   21.1 L


 


ABG O2 Saturation   96.7


 


ABG O2 Content   12.3 L


 


ABG Base Excess   -4.5 L


 


ABG Hemoglobin   9.1 L


 


ABG Carboxyhemoglobin   0.6


 


POC ABG HHb (Measured)   3.2


 


ABG Methemoglobin   1.3


 


ABG O2 Capacity   12.7 L


 


Hgb O2 Saturation   94.8 L


 


FiO2   28.0


 


Sodium  


 


Potassium  


 


Chloride  


 


Carbon Dioxide  


 


Anion Gap  


 


BUN  


 


Creatinine  


 


Est GFR ( Amer)  


 


Est GFR (Non-Af Amer)  


 


Random Glucose  


 


Uric Acid  


 


Calcium  


 


Total Bilirubin  


 


AST  


 


ALT  


 


Alkaline Phosphatase  


 


Troponin I  


 


Total Protein  


 


Total Protein (PEP)  


 


Albumin  


 


Globulin  


 


Albumin/Globulin Ratio  


 


25-OH Vitamin D Total  


 


PTH Intact Whole Molec  


 


GLENN Screen  


 


Double Strand DNA Ab  


 


Blood Type  


 


Blood Type Confirm  


 


Antibody Screen  


 


Crossmatch  


 


BBK History Checked  











EKG/Cardiology Studies: 


Cardiology / EKG Studies





02/07/19 06:00


EKG [ELECTROCARDIOGRAM] Routine 


   Comment: 


   Reason For Exam: dyspnea











Fingerstick Blood Sugar Results: 136





Review of Systems





- Review of Systems


Review of Systems: 


except for what was mentioned in HPI








Critical Care Progress Note





- Ventilator Checklist


Head of Bed 30 Degrees: Yes


PUD Prophalyxis: Yes


DVT Prophylaxis: Yes





- Nutrition


Nutrition: 


                                    Nutrition











 Category Date Time Status


 


 Renal Diet [DIET] Diets  02/05/19 Dinner Ordered














Assessment/Plan





- Assessment and Plan (Free Text)


Assessment: 


68 year old male with past medical history of arthritis presents with acute 

renal failure likely 2/2 to acute tubular necrosis vs. postobstructive 

nephropathy from dehydration. Patient is currently on sodium bicarbonate drip 

due to renal failure. Plan is for dialysis today.





Plan: 


Acute Renal Failure 2/2 to Acute Tubular Necrosis vs. Postobstructive 

Nephropathy


-BUN/Cr improved at 130/10 from 13.5 with unknown baseline creatinine


-Renal ultrasound: 11 mm nonobstructing stone in lower pole of left kidney


-Sparse urine output at this time


-Strict I and O with jovel. Measure daily weight


-Continue Sodium bicarbonate drip with 3 amps


-Continue aranesp and phoslo. Started vitamin D


-Continue flomax 0.4 mg daily


-Dialysis scheduled for today.








Nephrolithiasis


-Renal ultrasound: 11 mm nonobstructing stone in lower pole of left kidney


-Abdominal CT: obstructive 9 mm mid left ureteral calculus with mild left 

hydronephrosis of 8 mm


-Scheduled for ureteral stent procedure with Dr. López today.


-Replete electrolytes as needed.   


-Maintain euvolemia.   





Hypokalemia


-K: 3.2


-Patient will have potassium corrected through dialysis.





Tachypnea due to metabolic acidosis


-ABG 2/5: improved with pH: 7.22, pO2: 154, pCO2: 13 status post dialysis


-CXR: no active disease


-Maintain O2 saturation>90%.   


-Head of bed to 30 degrees  


-Conservative fluid management  


-Maintain oral hygiene  





Elevated BNP 2/2 to diastolic CHF vs. pulmonary etiology


-BNP: 75393


-EKG: NSR with 1st degree AV block


-Echocardiogram: preserved EF with Grade I abnormal relaxation


-Troponinx3:  0.11, 0.27, 0.97, 2.23, 1.75 trending down


-Continue with metoprolol succinate


-Maintain MAP>65.    


-Monitor for S/S, HD compromise.   





Type II NSTEMI


-Troponinx3:  0.11, 0.27, 0.97, 2.23, 1.75 trending down


-Likely 2/2 to acute renal failure


-Continue aspirin, lipitor, metoprolol succinate


-Follow recommendations as per Cardiology, Dr. Lewis/Taurus.





History of tobacco abuse


-Cannot rule out COPD


-Duonebs Q2 PRN for shortness of breath





Normocytic Anemia


-H/H increased to 8.3/23.6 status post 2 U of PRBCs yesterday


-Likely 2/2 to decreased erythropoietin from acute renal failure


-Maintain Hgb>8


-Follow up repeat CBC at 12:00


-As per GI, patient should be continued on protonix and EGD should be performed 

to rule out PUD when medically optimized.


-GI, Dr. Soria, consulted for recommendations to evaluate for GI bleed.





Elevated D-dimer


-Bilateral lower extremity ultrasound: negative for DVT


-Avoid CTA to evaluate for PE due to acute renal failure


-Avoid V/Q scan to evaluate for PE due to current tachypnea. 





Rule out Pneumonia vs. UTI


-Vancomycin and cefepime day 3 for unlikely pneumonia


-Monitor for signs and symptoms of infection.   





GI prophylaxis  


-Protonix 40 mg daily





DVT prophylaxis  


-heparin 5000 U Q8 held due to possible GI Bleed





Disposition: Will follow up CBC at 12:00. If hemoglobin is stable, and patient 

is hemodynamically stable, patient will be deemed stable for transfer out of the

 ICU.





Patient plan discussed with attending.











- Date & Time


Date: 02/07/19


Time: 09:27





<Hi Paulino - Last Filed: 02/07/19 11:02>





CCU Objective





- Vital Signs / Intake & Output


Intake and Output (Last 8hrs): 


                                 Intake & Output











 02/06/19 02/07/19 02/07/19





 22:59 06:59 14:59


 


Intake Total 600  


 


Output Total 350  


 


Balance 250  


 


Intake:   


 


    


 


    antibiotics 100  


 


    bicarb 200  


 


  Oral 300  


 


Output:   


 


  Urine 350  


 


    Urethral (Jovel) 350  


 


Other:   


 


  # Bowel Movements 0  














- Medications


Active Medications: 


Active Medications











Generic Name Dose Route Start Last Admin





  Trade Name Freq  PRN Reason Stop Dose Admin


 


Albuterol/Ipratropium  3 ml  02/04/19 17:04  





  Duoneb 3 Mg/0.5 Mg (3 Ml) Ud  IH   





  Q2H PRN   





  Shortness of Breath   





     





     





     


 


Aspirin  81 mg  02/05/19 10:00  02/06/19 12:45





  Aspirin Chewable  PO   Not Given





  DAILY BRENT   





     





     





     





     


 


Atorvastatin Calcium  40 mg  02/05/19 17:00  02/06/19 17:47





  Lipitor  PO   40 mg





  DIN BRENT   Administration





     





     





     





     


 


Calcium Acetate  2,001 mg  02/05/19 12:00  02/07/19 09:36





  Phoslo  PO   Not Given





  WM BRENT   





     





     





     





     


 


Darbepoetin Balta  100 mcg  02/05/19 10:00  02/05/19 13:50





  Aranesp  IVP   100 mcg





  QWK BRENT   Administration





     





     





     





     


 


Ergocalciferol  1 cap  02/06/19 14:30  02/06/19 17:47





  Drisdol 50,000 Intl Units Cap  PO   1 cap





  Q7D BRENT   Administration





     





     





     





     


 


Cefepime HCl  1 gm in 100 mls @ 100 mls/hr  02/04/19 18:45  02/06/19 17:48





  Maxipime 1gm  IVPB   100 mls/hr





  Q24H BRENT   Administration





     





     





  Protocol   





     


 


Metoprolol Succinate  25 mg  02/06/19 10:30  02/06/19 13:47





  Toprol Xl  PO   25 mg





  BRK BRENT   Administration





     





     





     





     


 


Nystatin  0 ea  02/05/19 14:00  02/07/19 09:26





  Mycostatin Cream  TOP   1 applic





  TID BRENT   Administration





     





     





     





     


 


Pantoprazole Sodium  40 mg  02/07/19 10:00  





  Protonix Inj  IVP   





  BID [facility]   





     





     





     





     


 


Tamsulosin HCl  0.4 mg  02/05/19 12:30  02/06/19 12:45





  Flomax  PO   Not Given





  DAILY [facility]   





     





     





     





     


 


Vitamin B Complex/Vit C/Folic Acid  1 tab  02/06/19 08:00  02/06/19 12:46





  Nephro-Seda  PO   Not Given





  0800 [facility]   





     





     





     





     














- Patient Studies


Lab Studies: 


                              Microbiology Studies











 02/05/19 13:17 Urine Culture - Final





 Urine,Clean Catch    No Growth (<1,000 CFU/ML)


 


 02/04/19 15:00 Blood Culture - Preliminary





 Blood-Venous    NO GROWTH AFTER 48 HOURS


 


 02/04/19 14:30 Blood Culture - Preliminary





 Blood-Venous    NO GROWTH AFTER 48 HOURS


 


 02/04/19 20:18 MRSA Culture (Admit) - Final





 Naris    MRSA NOT DETECTED








                                   Lab Studies











  02/07/19 02/07/19 02/07/19 Range/Units





  05:00 03:15 03:15 


 


WBC   10.1  D   (4.5-11.0)  10^3/uL


 


RBC   2.89 L   (3.5-6.1)  10^6/uL


 


Hgb   8.3 L   (14.0-18.0)  g/dL


 


Hct   23.6 L   (42.0-52.0)  %


 


MCV   81.7   (80.0-105.0)  fl


 


MCH   28.7   (25.0-35.0)  pg


 


MCHC   35.2   (31.0-37.0)  g/dl


 


RDW   14.3   (11.5-14.5)  %


 


Plt Count   166   (120.0-450.0)  10^3/uL


 


MPV   9.6   (7.0-11.0)  fl


 


Neut % (Auto)   80.4 H   (50.0-68.0)  %


 


Lymph % (Auto)   15.9 L   (22.0-35.0)  %


 


Mono % (Auto)   3.2   (1.0-6.0)  %


 


Eos % (Auto)   0.4 L   (1.5-5.0)  %


 


Baso % (Auto)   0.1   (0.0-3.0)  %


 


Lymph # (Auto)   1.6   (1.2-3.4)  


 


Mono # (Auto)   0.3   (0.1-0.6)  


 


Eos # (Auto)   0.0   (0.0-0.7)  


 


Baso # (Auto)   0.01   (0.0-2.0)  K/mm3


 


Absolute Neuts (auto)   8.14 H   (1.4-6.5)  


 


pCO2  34 L    (35-45)  mm/Hg


 


pO2  106.0 H    ()  mm/Hg


 


HCO3  20.1 L    (21-28)  mmol/L


 


ABG pH  7.38    (7.35-7.45)  


 


ABG Total CO2  21.1 L    (22-28)  mmol.L


 


ABG O2 Saturation  96.7    (95-98)  %


 


ABG O2 Content  12.3 L    (15-23)  ML/dl


 


ABG Base Excess  -4.5 L    (-2.0-3.0)  mmol/L


 


ABG Hemoglobin  9.1 L    (11.7-17.4)  g/dL


 


ABG Carboxyhemoglobin  0.6    (0.5-1.5)  %


 


POC ABG HHb (Measured)  3.2    (0-5)  %


 


ABG Methemoglobin  1.3    (0.0-3.0)  %


 


ABG O2 Capacity  12.7 L    (16-24)  mL/dl


 


Hgb O2 Saturation  94.8 L    (95.0-98.0)  %


 


FiO2  28.0    %


 


Sodium    137  (132-148)  mmol/L


 


Potassium    3.2 L  (3.6-5.0)  mmol/L


 


Chloride    100  ()  mmol/L


 


Carbon Dioxide    21  (21-33)  mmol/L


 


Anion Gap    19  (10-20)  


 


BUN    130 H*  (7-21)  mg/dL


 


Creatinine    10.0 H*  (0.8-1.5)  mg/dl


 


Est GFR (African Amer)    6  


 


Est GFR (Non-Af Amer)    5  


 


Random Glucose    126 H  ()  mg/dL


 


Uric Acid    7.8  (3.5-8.5)  mg/dL


 


Calcium    6.3 L*  (8.4-10.5)  mg/dL


 


Total Bilirubin    0.6  (0.2-1.3)  mg/dL


 


AST    45  (17-59)  U/L


 


ALT    41  (7-56)  U/L


 


Alkaline Phosphatase    79  ()  U/L


 


Troponin I     ng/mL


 


Total Protein    6.2  (5.8-8.3)  g/dL


 


Total Protein (PEP)     (6.1-8.1)  g/dL


 


Albumin    3.1  (3.0-4.8)  g/dL


 


Globulin    3.1  gm/dL


 


Albumin/Globulin Ratio    1.0 L  (1.1-1.8)  


 


25-OH Vitamin D Total     (30.0-100.0)  NG/ML


 


PTH Intact Whole Molec     (14-64)  pg/mL


 


GLENN Screen     (Negative)  


 


Double Strand DNA Ab     IU/mL


 


Blood Type     


 


Antibody Screen     


 


Crossmatch     


 


BBK History Checked     














  02/06/19 02/06/19 02/06/19 Range/Units





  19:23 19:23 09:00 


 


WBC  13.4 H D    (4.5-11.0)  10^3/uL


 


RBC  3.08 L    (3.5-6.1)  10^6/uL


 


Hgb  8.8 L D    (14.0-18.0)  g/dL


 


Hct  25.1 L    (42.0-52.0)  %


 


MCV  81.5    (80.0-105.0)  fl


 


MCH  28.6    (25.0-35.0)  pg


 


MCHC  35.1    (31.0-37.0)  g/dl


 


RDW  14.3    (11.5-14.5)  %


 


Plt Count  182    (120.0-450.0)  10^3/uL


 


MPV  9.1    (7.0-11.0)  fl


 


Neut % (Auto)     (50.0-68.0)  %


 


Lymph % (Auto)     (22.0-35.0)  %


 


Mono % (Auto)  3.1    (1.0-6.0)  %


 


Eos % (Auto)  0.1 L    (1.5-5.0)  %


 


Baso % (Auto)  0.1    (0.0-3.0)  %


 


Lymph # (Auto)     (1.2-3.4)  


 


Mono # (Auto)  0.4    (0.1-0.6)  


 


Eos # (Auto)  0.0    (0.0-0.7)  


 


Baso # (Auto)  0.01    (0.0-2.0)  K/mm3


 


Absolute Neuts (auto)     (1.4-6.5)  


 


pCO2     (35-45)  mm/Hg


 


pO2     ()  mm/Hg


 


HCO3     (21-28)  mmol/L


 


ABG pH     (7.35-7.45)  


 


ABG Total CO2     (22-28)  mmol.L


 


ABG O2 Saturation     (95-98)  %


 


ABG O2 Content     (15-23)  ML/dl


 


ABG Base Excess     (-2.0-3.0)  mmol/L


 


ABG Hemoglobin     (11.7-17.4)  g/dL


 


ABG Carboxyhemoglobin     (0.5-1.5)  %


 


POC ABG HHb (Measured)     (0-5)  %


 


ABG Methemoglobin     (0.0-3.0)  %


 


ABG O2 Capacity     (16-24)  mL/dl


 


Hgb O2 Saturation     (95.0-98.0)  %


 


FiO2     %


 


Sodium   137   (132-148)  mmol/L


 


Potassium   2.9 L*   (3.6-5.0)  mmol/L


 


Chloride   100   ()  mmol/L


 


Carbon Dioxide   20 L   (21-33)  mmol/L


 


Anion Gap   19   (10-20)  


 


BUN   126 H*   (7-21)  mg/dL


 


Creatinine   9.0 H* D   (0.8-1.5)  mg/dl


 


Est GFR ( Amer)   7   


 


Est GFR (Non-Af Amer)   6   


 


Random Glucose   125 H   ()  mg/dL


 


Uric Acid     (3.5-8.5)  mg/dL


 


Calcium   6.6 L*   (8.4-10.5)  mg/dL


 


Total Bilirubin   0.6   (0.2-1.3)  mg/dL


 


AST   40   (17-59)  U/L


 


ALT   39   (7-56)  U/L


 


Alkaline Phosphatase   82   ()  U/L


 


Troponin I   1.75 H* D   ng/mL


 


Total Protein   6.6   (5.8-8.3)  g/dL


 


Total Protein (PEP)     (6.1-8.1)  g/dL


 


Albumin   3.3   (3.0-4.8)  g/dL


 


Globulin   3.3   gm/dL


 


Albumin/Globulin Ratio   1.0 L   (1.1-1.8)  


 


25-OH Vitamin D Total     (30.0-100.0)  NG/ML


 


PTH Intact Whole Molec     (14-64)  pg/mL


 


GLENN Screen     (Negative)  


 


Double Strand DNA Ab     IU/mL


 


Blood Type    A POSITIVE  


 


Antibody Screen    Negative  


 


Crossmatch    See Detail  


 


BBK History Checked    No verified bt  














  02/06/19 02/06/19 02/05/19 Range/Units





  06:00 06:00 10:10 


 


WBC     (4.5-11.0)  10^3/uL


 


RBC     (3.5-6.1)  10^6/uL


 


Hgb     (14.0-18.0)  g/dL


 


Hct     (42.0-52.0)  %


 


MCV     (80.0-105.0)  fl


 


MCH     (25.0-35.0)  pg


 


MCHC     (31.0-37.0)  g/dl


 


RDW     (11.5-14.5)  %


 


Plt Count     (120.0-450.0)  10^3/uL


 


MPV     (7.0-11.0)  fl


 


Neut % (Auto)     (50.0-68.0)  %


 


Lymph % (Auto)     (22.0-35.0)  %


 


Mono % (Auto)     (1.0-6.0)  %


 


Eos % (Auto)     (1.5-5.0)  %


 


Baso % (Auto)     (0.0-3.0)  %


 


Lymph # (Auto)     (1.2-3.4)  


 


Mono # (Auto)     (0.1-0.6)  


 


Eos # (Auto)     (0.0-0.7)  


 


Baso # (Auto)     (0.0-2.0)  K/mm3


 


Absolute Neuts (auto)     (1.4-6.5)  


 


pCO2     (35-45)  mm/Hg


 


pO2     ()  mm/Hg


 


HCO3     (21-28)  mmol/L


 


ABG pH     (7.35-7.45)  


 


ABG Total CO2     (22-28)  mmol.L


 


ABG O2 Saturation     (95-98)  %


 


ABG O2 Content     (15-23)  ML/dl


 


ABG Base Excess     (-2.0-3.0)  mmol/L


 


ABG Hemoglobin     (11.7-17.4)  g/dL


 


ABG Carboxyhemoglobin     (0.5-1.5)  %


 


POC ABG HHb (Measured)     (0-5)  %


 


ABG Methemoglobin     (0.0-3.0)  %


 


ABG O2 Capacity     (16-24)  mL/dl


 


Hgb O2 Saturation     (95.0-98.0)  %


 


FiO2     %


 


Sodium     (132-148)  mmol/L


 


Potassium     (3.6-5.0)  mmol/L


 


Chloride     ()  mmol/L


 


Carbon Dioxide     (21-33)  mmol/L


 


Anion Gap     (10-20)  


 


BUN     (7-21)  mg/dL


 


Creatinine     (0.8-1.5)  mg/dl


 


Est GFR (African Amer)     


 


Est GFR (Non-Af Amer)     


 


Random Glucose     ()  mg/dL


 


Uric Acid     (3.5-8.5)  mg/dL


 


Calcium     (8.4-10.5)  mg/dL


 


Total Bilirubin     (0.2-1.3)  mg/dL


 


AST     (17-59)  U/L


 


ALT     (7-56)  U/L


 


Alkaline Phosphatase     ()  U/L


 


Troponin I     ng/mL


 


Total Protein     (5.8-8.3)  g/dL


 


Total Protein (PEP)   5.5 L   (6.1-8.1)  g/dL


 


Albumin     (3.0-4.8)  g/dL


 


Globulin     gm/dL


 


Albumin/Globulin Ratio     (1.1-1.8)  


 


25-OH Vitamin D Total  < 12.8 L    (30.0-100.0)  NG/ML


 


PTH Intact Whole Molec    620 H  (14-64)  pg/mL


 


GLENN Screen     (Negative)  


 


Double Strand DNA Ab     IU/mL


 


Blood Type     


 


Antibody Screen     


 


Crossmatch     


 


BBK History Checked     














  02/05/19 Range/Units





  10:10 


 


WBC   (4.5-11.0)  10^3/uL


 


RBC   (3.5-6.1)  10^6/uL


 


Hgb   (14.0-18.0)  g/dL


 


Hct   (42.0-52.0)  %


 


MCV   (80.0-105.0)  fl


 


MCH   (25.0-35.0)  pg


 


MCHC   (31.0-37.0)  g/dl


 


RDW   (11.5-14.5)  %


 


Plt Count   (120.0-450.0)  10^3/uL


 


MPV   (7.0-11.0)  fl


 


Neut % (Auto)   (50.0-68.0)  %


 


Lymph % (Auto)   (22.0-35.0)  %


 


Mono % (Auto)   (1.0-6.0)  %


 


Eos % (Auto)   (1.5-5.0)  %


 


Baso % (Auto)   (0.0-3.0)  %


 


Lymph # (Auto)   (1.2-3.4)  


 


Mono # (Auto)   (0.1-0.6)  


 


Eos # (Auto)   (0.0-0.7)  


 


Baso # (Auto)   (0.0-2.0)  K/mm3


 


Absolute Neuts (auto)   (1.4-6.5)  


 


pCO2   (35-45)  mm/Hg


 


pO2   ()  mm/Hg


 


HCO3   (21-28)  mmol/L


 


ABG pH   (7.35-7.45)  


 


ABG Total CO2   (22-28)  mmol.L


 


ABG O2 Saturation   (95-98)  %


 


ABG O2 Content   (15-23)  ML/dl


 


ABG Base Excess   (-2.0-3.0)  mmol/L


 


ABG Hemoglobin   (11.7-17.4)  g/dL


 


ABG Carboxyhemoglobin   (0.5-1.5)  %


 


POC ABG HHb (Measured)   (0-5)  %


 


ABG Methemoglobin   (0.0-3.0)  %


 


ABG O2 Capacity   (16-24)  mL/dl


 


Hgb O2 Saturation   (95.0-98.0)  %


 


FiO2   %


 


Sodium   (132-148)  mmol/L


 


Potassium   (3.6-5.0)  mmol/L


 


Chloride   ()  mmol/L


 


Carbon Dioxide   (21-33)  mmol/L


 


Anion Gap   (10-20)  


 


BUN   (7-21)  mg/dL


 


Creatinine   (0.8-1.5)  mg/dl


 


Est GFR (African Amer)   


 


Est GFR (Non-Af Amer)   


 


Random Glucose   ()  mg/dL


 


Uric Acid   (3.5-8.5)  mg/dL


 


Calcium   (8.4-10.5)  mg/dL


 


Total Bilirubin   (0.2-1.3)  mg/dL


 


AST   (17-59)  U/L


 


ALT   (7-56)  U/L


 


Alkaline Phosphatase   ()  U/L


 


Troponin I   ng/mL


 


Total Protein   (5.8-8.3)  g/dL


 


Total Protein (PEP)   (6.1-8.1)  g/dL


 


Albumin   (3.0-4.8)  g/dL


 


Globulin   gm/dL


 


Albumin/Globulin Ratio   (1.1-1.8)  


 


25-OH Vitamin D Total   (30.0-100.0)  NG/ML


 


PTH Intact Whole Molec   (14-64)  pg/mL


 


GLENN Screen  Negative  (Negative)  


 


Double Strand DNA Ab  <1  IU/mL


 


Blood Type   


 


Antibody Screen   


 


Crossmatch   


 


BBK History Checked   








                         Laboratory Results - last 24 hr











  02/05/19 02/05/19 02/06/19





  10:10 10:10 06:00


 


WBC   


 


RBC   


 


Hgb   


 


Hct   


 


MCV   


 


MCH   


 


MCHC   


 


RDW   


 


Plt Count   


 


MPV   


 


Neut % (Auto)   


 


Lymph % (Auto)   


 


Mono % (Auto)   


 


Eos % (Auto)   


 


Baso % (Auto)   


 


Lymph # (Auto)   


 


Mono # (Auto)   


 


Eos # (Auto)   


 


Baso # (Auto)   


 


Absolute Neuts (auto)   


 


pCO2   


 


pO2   


 


HCO3   


 


ABG pH   


 


ABG Total CO2   


 


ABG O2 Saturation   


 


ABG O2 Content   


 


ABG Base Excess   


 


ABG Hemoglobin   


 


ABG Carboxyhemoglobin   


 


POC ABG HHb (Measured)   


 


ABG Methemoglobin   


 


ABG O2 Capacity   


 


Hgb O2 Saturation   


 


FiO2   


 


Sodium   


 


Potassium   


 


Chloride   


 


Carbon Dioxide   


 


Anion Gap   


 


BUN   


 


Creatinine   


 


Est GFR ( Amer)   


 


Est GFR (Non-Af Amer)   


 


Random Glucose   


 


Uric Acid   


 


Calcium   


 


Total Bilirubin   


 


AST   


 


ALT   


 


Alkaline Phosphatase   


 


Troponin I   


 


Total Protein   


 


Total Protein (PEP)    5.5 L


 


Albumin   


 


Globulin   


 


Albumin/Globulin Ratio   


 


25-OH Vitamin D Total   


 


PTH Intact Whole Molec   620 H 


 


GLENN Screen  Negative  


 


Double Strand DNA Ab  <1  


 


Blood Type   


 


Antibody Screen   


 


Crossmatch   


 


BBK History Checked   














  02/06/19 02/06/19 02/06/19





  06:00 09:00 19:23


 


WBC   


 


RBC   


 


Hgb   


 


Hct   


 


MCV   


 


MCH   


 


MCHC   


 


RDW   


 


Plt Count   


 


MPV   


 


Neut % (Auto)   


 


Lymph % (Auto)   


 


Mono % (Auto)   


 


Eos % (Auto)   


 


Baso % (Auto)   


 


Lymph # (Auto)   


 


Mono # (Auto)   


 


Eos # (Auto)   


 


Baso # (Auto)   


 


Absolute Neuts (auto)   


 


pCO2   


 


pO2   


 


HCO3   


 


ABG pH   


 


ABG Total CO2   


 


ABG O2 Saturation   


 


ABG O2 Content   


 


ABG Base Excess   


 


ABG Hemoglobin   


 


ABG Carboxyhemoglobin   


 


POC ABG HHb (Measured)   


 


ABG Methemoglobin   


 


ABG O2 Capacity   


 


Hgb O2 Saturation   


 


FiO2   


 


Sodium    137


 


Potassium    2.9 L*


 


Chloride    100


 


Carbon Dioxide    20 L


 


Anion Gap    19


 


BUN    126 H*


 


Creatinine    9.0 H* D


 


Est GFR ( Amer)    7


 


Est GFR (Non-Af Amer)    6


 


Random Glucose    125 H


 


Uric Acid   


 


Calcium    6.6 L*


 


Total Bilirubin    0.6


 


AST    40


 


ALT    39


 


Alkaline Phosphatase    82


 


Troponin I    1.75 H* D


 


Total Protein    6.6


 


Total Protein (PEP)   


 


Albumin    3.3


 


Globulin    3.3


 


Albumin/Globulin Ratio    1.0 L


 


25-OH Vitamin D Total  < 12.8 L  


 


PTH Intact Whole Molec   


 


GLENN Screen   


 


Double Strand DNA Ab   


 


Blood Type   A POSITIVE 


 


Antibody Screen   Negative 


 


Crossmatch   See Detail 


 


BBK History Checked   No verified bt 














  02/06/19 02/07/19 02/07/19





  19:23 03:15 03:15


 


WBC  13.4 H D   10.1  D


 


RBC  3.08 L   2.89 L


 


Hgb  8.8 L D   8.3 L


 


Hct  25.1 L   23.6 L


 


MCV  81.5   81.7


 


MCH  28.6   28.7


 


MCHC  35.1   35.2


 


RDW  14.3   14.3


 


Plt Count  182   166


 


MPV  9.1   9.6


 


Neut % (Auto)    80.4 H


 


Lymph % (Auto)    15.9 L


 


Mono % (Auto)  3.1   3.2


 


Eos % (Auto)  0.1 L   0.4 L


 


Baso % (Auto)  0.1   0.1


 


Lymph # (Auto)    1.6


 


Mono # (Auto)  0.4   0.3


 


Eos # (Auto)  0.0   0.0


 


Baso # (Auto)  0.01   0.01


 


Absolute Neuts (auto)    8.14 H


 


pCO2   


 


pO2   


 


HCO3   


 


ABG pH   


 


ABG Total CO2   


 


ABG O2 Saturation   


 


ABG O2 Content   


 


ABG Base Excess   


 


ABG Hemoglobin   


 


ABG Carboxyhemoglobin   


 


POC ABG HHb (Measured)   


 


ABG Methemoglobin   


 


ABG O2 Capacity   


 


Hgb O2 Saturation   


 


FiO2   


 


Sodium   137 


 


Potassium   3.2 L 


 


Chloride   100 


 


Carbon Dioxide   21 


 


Anion Gap   19 


 


BUN   130 H* 


 


Creatinine   10.0 H* 


 


Est GFR ( Amer)   6 


 


Est GFR (Non-Af Amer)   5 


 


Random Glucose   126 H 


 


Uric Acid   7.8 


 


Calcium   6.3 L* 


 


Total Bilirubin   0.6 


 


AST   45 


 


ALT   41 


 


Alkaline Phosphatase   79 


 


Troponin I   


 


Total Protein   6.2 


 


Total Protein (PEP)   


 


Albumin   3.1 


 


Globulin   3.1 


 


Albumin/Globulin Ratio   1.0 L 


 


25-OH Vitamin D Total   


 


PTH Intact Whole Molec   


 


GLENN Screen   


 


Double Strand DNA Ab   


 


Blood Type   


 


Antibody Screen   


 


Crossmatch   


 


BBK History Checked   














  02/07/19





  05:00


 


WBC 


 


RBC 


 


Hgb 


 


Hct 


 


MCV 


 


MCH 


 


MCHC 


 


RDW 


 


Plt Count 


 


MPV 


 


Neut % (Auto) 


 


Lymph % (Auto) 


 


Mono % (Auto) 


 


Eos % (Auto) 


 


Baso % (Auto) 


 


Lymph # (Auto) 


 


Mono # (Auto) 


 


Eos # (Auto) 


 


Baso # (Auto) 


 


Absolute Neuts (auto) 


 


pCO2  34 L


 


pO2  106.0 H


 


HCO3  20.1 L


 


ABG pH  7.38


 


ABG Total CO2  21.1 L


 


ABG O2 Saturation  96.7


 


ABG O2 Content  12.3 L


 


ABG Base Excess  -4.5 L


 


ABG Hemoglobin  9.1 L


 


ABG Carboxyhemoglobin  0.6


 


POC ABG HHb (Measured)  3.2


 


ABG Methemoglobin  1.3


 


ABG O2 Capacity  12.7 L


 


Hgb O2 Saturation  94.8 L


 


FiO2  28.0


 


Sodium 


 


Potassium 


 


Chloride 


 


Carbon Dioxide 


 


Anion Gap 


 


BUN 


 


Creatinine 


 


Est GFR ( Amer) 


 


Est GFR (Non-Af Amer) 


 


Random Glucose 


 


Uric Acid 


 


Calcium 


 


Total Bilirubin 


 


AST 


 


ALT 


 


Alkaline Phosphatase 


 


Troponin I 


 


Total Protein 


 


Total Protein (PEP) 


 


Albumin 


 


Globulin 


 


Albumin/Globulin Ratio 


 


25-OH Vitamin D Total 


 


PTH Intact Whole Molec 


 


GLENN Screen 


 


Double Strand DNA Ab 


 


Blood Type 


 


Antibody Screen 


 


Crossmatch 


 


BBK History Checked 











EKG/Cardiology Studies: 


Cardiology / EKG Studies





02/07/19 06:00


EKG [ELECTROCARDIOGRAM] Routine 


   Comment: 


   Reason For Exam: dyspnea














Critical Care Progress Note





- Nutrition


Nutrition: 


                                    Nutrition











 Category Date Time Status


 


 Renal Diet [DIET] Diets  02/05/19 Dinner Ordered














Attending/Attestation





- Attestation


I have personally seen and examined this patient.: Yes


I have fully participated in the care of the patient.: Yes


I have reviewed all pertinent clinical information: Yes


Notes (Text): 





02/07/19 11:01


The patient was seen and examined at the bedside. 


Patient care was discussed with resident 


Medical records, lab studies were reviewed and management issues were discussed 

and formulated.


Agree with above treatment plans as outlined in 's note with addition 

of the following:











ARF on HD \ Nephrolithiasis \ UTI \ Sepsis \ NSTEMI \ Anemia Acute on chronic 

diastolic CHF \Anemia





-hemodynamic monitoring to maintain MAP>65


-cardiology team f\u


-continue ASA, will hold if Hb continues to drop or sign of active bleed


-O2 supplementation to maintain spo2>90 Pao2>60; comfortable on NC


-broad spectrum Abx as per ID team; f\u cultures


-f\u Bun\Cr and U\o; HD and volume removal as per renal team


-PO diet and aspiration precautions


-monitor serial H\H and for bleeding


-hold heparin


-urology team f\u for possible ureteral stent placement today


-PT\OT eval


-DVT \ PUD  prophylaxis








CCM time 32mins

## 2019-02-07 NOTE — PN
DATE:  02/07/2019



SUBJECTIVE:  The patient is seen lying in bed in the CCU.  He states he is

feeling somewhat better.  His dyspnea has improved.  He received the

dialysis again yesterday.



MEDICATIONS:  His current medications include Aranesp, DuoNeb inhaler,

Flomax, Lipitor, Maxipime, PhosLo, Protonix and Toprol-XL 25 mg daily.



OBJECTIVE:

GENERAL:  He is a middle-aged man who appears comfortable at rest.

VITAL SIGNS:  Blood pressure 122/60 with pulse of 70 in sinus, respirations

16, he is afebrile.

HEENT:  No JVD.

CHEST:  Bilateral scattered rhonchi.

HEART:  PMI displaced laterally with a systolic murmur at left sternal

border.

ABDOMEN:  Soft, obese with normoactive bowel sounds.

EXTREMITIES:  1+ leg edema.



DIAGNOSTIC DATA:  Potassium 3.2, BUN and creatinine and 130 and 10.0. 

White count is 10.1, hemoglobin and hematocrit 8.3 and 23.6 with platelet

count 166,000.  A pH 7.38, pCO2 of 34, pO2 of 106.  Electrocardiogram

reveals sinus rhythm with first-degree AV block and nonspecific ST-T

abnormalities.



IMPRESSION:

1.  Acute renal failure requiring dialysis workup in progress.

2.  Recent elevated troponin, likely due to reduced renal clearance.  No

clear evidence of myocardial infarction at the present time.

3.  Severe anemia, status post transfusion, improved.  Workup in progress.



RECOMMENDATIONS:  Continue intensified dialysis advised.  An eventual cardiac

evaluation with stress testing will be planned once his other issues have

been addressed and stabilized.  Low-dose beta-blocker therapy will be

continued in the interim.  We will continue to follow and make further

things as appropriate.







__________________________________________

Rm Garay MD





DD:  02/07/2019 12:18:24

DT:  02/07/2019 12:20:12

Job # [id number]



MTDD

## 2019-02-07 NOTE — RAD
Date of service: 



02/07/2019



PROCEDURE:  Fluoroscopy up to 1 hr



HISTORY:

BILATERAL RETROGRADE PYELOGRAMS / STENT INSERTION (LEFT)



COMPARISON:





TECHNIQUE:

Fluoroscopy was provided in the operating room.  52.1 sec of fluoro 

time were use.  13.60 mGy cumulative dose.  Twelve images were 

submitted



FINDINGS:

There is a filling defect in the distal left ureter.  There is 

placement of a left ureteral stent.  The right renal collecting 

system is unremarkable



IMPRESSION:

As above

## 2019-02-08 LAB
ALBUMIN SERPL-MCNC: 3.3 G/DL (ref 3–4.8)
ALBUMIN/GLOB SERPL: 1 {RATIO} (ref 1.1–1.8)
ALT SERPL-CCNC: 38 U/L (ref 7–56)
AST SERPL-CCNC: 30 U/L (ref 17–59)
BUN SERPL-MCNC: 73 MG/DL (ref 7–21)
CALCIUM SERPL-MCNC: 6.5 MG/DL (ref 8.4–10.5)
ERYTHROCYTE [DISTWIDTH] IN BLOOD BY AUTOMATED COUNT: 14.1 % (ref 11.5–14.5)
GFR NON-AFRICAN AMERICAN: 8
HGB BLD-MCNC: 8.9 G/DL (ref 14–18)
MCH RBC QN AUTO: 28 PG (ref 25–35)
MCHC RBC AUTO-ENTMCNC: 33.6 G/DL (ref 31–37)
MCV RBC AUTO: 83.3 FL (ref 80–105)
MONOCYTE: 1 % (ref 1–6)
NEUTROPHILS NFR BLD AUTO: 99 % (ref 50–70)
PLATELET # BLD EST: NORMAL 10*3/UL
PLATELET # BLD: 179 10^3/UL (ref 120–450)
PMV BLD AUTO: 9.7 FL (ref 7–11)
RBC # BLD AUTO: 3.18 10^6/UL (ref 3.5–6.1)
WBC # BLD AUTO: 19.3 10^3/UL (ref 4.5–11)

## 2019-02-08 RX ADMIN — METOPROLOL SUCCINATE SCH MG: 25 TABLET, EXTENDED RELEASE ORAL at 07:44

## 2019-02-08 RX ADMIN — NYSTATIN SCH APPLIC: 100000 CREAM TOPICAL at 15:23

## 2019-02-08 RX ADMIN — Medication SCH TAB: at 09:47

## 2019-02-08 RX ADMIN — NYSTATIN SCH APPLIC: 100000 CREAM TOPICAL at 10:00

## 2019-02-08 RX ADMIN — NYSTATIN SCH APPLIC: 100000 CREAM TOPICAL at 19:44

## 2019-02-08 NOTE — PN
DATE:  02/08/2019



SUBJECTIVE:  The patient is currently in ICU, bed 128 -1.  The patient has

no complaints and there have been no significant events overnight.



PHYSICAL EXAMINATION

VITAL SIGNS:  The patient is afebrile, pulse rate of 92, blood pressure

117/62, respiratory rate of 22 with an O2 saturation 97%.

HEENT:  PERRLA, EOMI.  No icterus is appreciated.

NECK:  Supple with full range of motion.  No adenopathy or bruits were

present.

CARDIOPULMONARY:  Regular rate and rhythm.

LUNGS:  Clear to auscultation and percussion bilaterally.

ABDOMEN:  Soft.  It is nontender.  Bowel sounds are normoactive.

EXTREMITIES:  Show no deformities.

NEUROLOGICAL:  There is no focal motor deficits.



LABORATORY VALUES:  WBC of 19.3, hemoglobin and hematocrit rate 8.9 and

26.5 which is holding steady.  Coag profile is essentially normal. 

Chemistry shows a potassium of 3.4, calcium of 6.5 which is low.  Troponins

are now coming _____ from 1.75 to 1.36.  BUN of 73, creatinine of 7.1 which

are down.



ASSESSMENT AND PLAN:  The patient is currently being dialyzed for acute

renal failure.  The problem at this time is acute renal failure and sepsis.

We will continue current regimen.  Clostridium difficile toxins A and B are

negative.  We will continue medical regimen.  Continue dialysis.







__________________________________________

Eran Osman MD





DD:  02/08/2019 20:09:54

DT:  02/08/2019 20:12:11

Job # [id number]

## 2019-02-08 NOTE — PN
DATE:  02/08/2019



SUBJECTIVE:  The patient is seen lying in bed in the ICU.  He is feeling

somewhat better.  His dyspnea is improved.  His BUN and creatinine have

improved to 73 and 7.1 after recurrent dialysis.  He denies any chest pain.

His troponin remains mildly elevated 1.36.  His current medications include

Aranesp, aspirin, DuoNeb inhaler, Flomax, Lasix 40 mg daily, Lipitor 40 mg

daily, Maxipime, PhosLo, Protonix and Toprol XL 25 mg daily.  He underwent

cystoscopy yesterday with placement of a left ureteral stent.



OBJECTIVE:

GENERAL:  He is a middle-aged man who appears comfortable at the present

time.

VITAL SIGNS:  Blood pressure is 120/60 with a pulse of 86 in sinus and

respirations are 16.  He is afebrile.

HEENT:  No JVD.  A dialysis catheter is present in the right jugular vein.

CHEST:  Bilateral scattered rhonchi.

HEART:  PMI displaced laterally with systolic murmur at left sternal

border.

ABDOMEN:  Soft, obese with normoactive bowel sounds.

EXTREMITIES:  1+ leg edema.



DIAGNOSTIC DATA:  Potassium 3.4, BUN and creatinine are 73 and 7.1 and

glucose 139.  White count 19.3, hemoglobin and hematocrit 8.9 and 26.5 with

platelet count of 179,000.



IMPRESSION:

1.  Acute renal failure requiring dialysis, etiology uncertain.

2.  Obstructing left ureteral calculus status post ureteral stent.

3.  Elevated troponin likely due to reduced renal clearance.

4.  Anemia status post transfusion, awaits further workup.



RECOMMENDATIONS:  Dialysis effort should continue.  Evaluation for GI

because of blood loss is advised.  He appears stable from cardiac

standpoint to undergo upper and lower endoscopy if needed.  Low-dose

beta-blocker therapy will be continued for now.  Antiplatelet anticoagulant

therapy will continue to be withheld at the present time.  An eventual

cardiac evaluation with stress testing would be advisable but not necessary

at this juncture in time.  We will continue to follow and make further

recommendations as appropriate.







__________________________________________

Rm Garay MD





DD:  02/08/2019 8:30:15

DT:  02/08/2019 8:32:16

Job # [id number]

## 2019-02-08 NOTE — CP.PCM.PCO
Physician Communication Note





- Physician Communication Note


Physician Communication Note: pt. w. increased wbc 19 today,repeat blood 

cx,urine cx,Antibx restarted,cxr

## 2019-02-08 NOTE — RAD
Date of service: 



02/08/2019



HISTORY:

 r/o infilt, increased wbc, 



COMPARISON:

02/05/2019 



FINDINGS:



LUNGS:

No active pulmonary disease.



PLEURA:

No significant pleural effusion identified, no pneumothorax apparent.



CARDIOVASCULAR:

No aortic atherosclerotic calcification present.



Normal cardiac size. No pulmonary vascular congestion. 



OSSEOUS STRUCTURES:

No significant abnormalities.



VISUALIZED UPPER ABDOMEN:

Normal.



OTHER FINDINGS:

Right internal jugular line terminates in the upper right atrium



IMPRESSION:

No active disease.

## 2019-02-08 NOTE — OP
PROCEDURE DATE:  02/07/2019



PREOPERATIVE DIAGNOSES:  Renal failure, obstructing left ureteral calculus.



POSTOPERATIVE DIAGNOSES:  Renal failure, obstructing left ureteral

calculus.



PROCEDURE:  Cystoscopy, bilateral retrograde pyelograms, and insertion of a

left ureteral stent.



ATTENDING SURGEON:  Raymond López MD



ANESTHESIA:  General LMA.



SPECIMENS:  None.



DRAINS:  A 6 x 24 left ureteral stent and a 16-Togolese Reyes catheter.



COMPLICATIONS:  None.



OPERATIVE FINDINGS:  After informed consent was obtained, the patient was

taken to operating room, placed on the operating room table and anesthesia

was administered.  The patient was then placed in a dorsal lithotomy

position and prepped and draped in usual sterile fashion.  A 21-Togolese

cystoscope was then placed in the patient's urethra and advanced proximally

under direct vision until the bladder was entered.  A full survey

inspection of bladder was then performed which revealed no papillary

tumors.  There were multiple tiny calculi noted free floating in the

bladder.  There was some evidence of catheter reaction on the posterior

wall.  There was grade 1 to 2 trabeculation noted.  The prostate was

moderately enlarged with trilobar hypotrophy.  Both ureteral orifices were

visualized and appeared within normal limits.  At this point, an open-ended

5-Togolese ureteral catheter was introduced through the cystoscope and guided

into the left ureteral orifice.  The left retrograde pyelogram was then

performed by instilling contrast through the open-ended ureteral catheter

into the left ureter during real-time fluoroscopy.  In the upper portion of

the lower ureter, there was a large filling defect noted.  The filling

defect was mobile.  The ureter was mildly dilated above this filling

defect.  There was mild hydronephrosis and dilatation of the collecting

system noted.  At this point, a Sensor wire was obtained.  The open-ended

ureteral catheter was advanced up the ureter under fluoroscopic guidance,

it was able to be manipulated past the calculus and advanced until it was

coiled in the upper collecting system.  At this point, the open-ended

catheter was removed and a 6 x 24 stent was obtained.  It was passed over

the wire through the cystoscope and into the left ureter.  It was advanced

proximally under direct and fluoroscopic guidance until it was at the

appropriate position.  When the stent was in proper position, the guidewire

was removed.  A coil was seen in the renal pelvis on fluoroscopy.  A coil

was seen in bladder on cystoscopy.  At this point, the open-ended ureteral

catheter was again passed, and at this time, it was guided into the right

ureteral orifice.  A right retrograde pyelogram was then performed in a

similar fashion that is described for the left side.  The right retrograde

pyelogram appeared grossly within normal limits.  There was no evidence of

filling defect.  There was no dilatation of the collecting system and the

system was noted to drain spontaneously.  At this point, the procedure was

completed, the bladder was then drained and the cystoscope was removed.  A

16-Togolese two-way Reyes catheter was then passed to straight bladder

drainage.  The patient tolerated the procedure well.  He was returned to

the supine position and taken to the recovery room awake and in stable

condition.





__________________________________________

Raymond López MD





DD:  02/07/2019 17:18:36

DT:  02/07/2019 17:20:43

Job # [id number]

## 2019-02-08 NOTE — NM
Date of service: 



02/08/2019



PROCEDURE:  Renal scan and flow study



HISTORY:

lasix renal scan to assess split renal function



COMPARISON:

02/05/2019.  Bilateral renal ultrasound



02/05/2019 CT abdomen and pelvis.



TECHNIQUE:

11.2 mCi technetium 99 M Mag 3 administered intravenously.



FINDINGS:









Right Kidney:



Flow component: Diminished flow to the right kidney.  This is more 

poorly perfused compared to the left kidney.



Time to peak: 29.5 min



Peak to T1/2 Peak: Not applicable



Left Kidney:



Flow component: Diminished perfusion/flow to the left kidney.



Time to peak: 28.5 min



Peak to T1/2 Peak: Not applicable



Split Renal Function:



Right kidney 38.4 %



Left kidney 61.6 %



IMPRESSION:

Poorly perfused, poorly functioning/obstructed right kidney.  This is 

reflected in split renal function 38.4%.



Diminished perfusion compared to normal of the left kidney.  There is 

poor clearance indicative of an obstructing component left kidney.

## 2019-02-09 LAB
ALBUMIN SERPL-MCNC: 3.3 G/DL (ref 3–4.8)
ALBUMIN/GLOB SERPL: 1 {RATIO} (ref 1.1–1.8)
ALT SERPL-CCNC: 34 U/L (ref 7–56)
ANCA SCREEN: NEGATIVE
AST SERPL-CCNC: 34 U/L (ref 17–59)
BUN SERPL-MCNC: 101 MG/DL (ref 7–21)
CALCIUM SERPL-MCNC: 7.2 MG/DL (ref 8.4–10.5)
ERYTHROCYTE [DISTWIDTH] IN BLOOD BY AUTOMATED COUNT: 14.1 % (ref 11.5–14.5)
GFR NON-AFRICAN AMERICAN: 6
HGB BLD-MCNC: 8.7 G/DL (ref 14–18)
MCH RBC QN AUTO: 28.3 PG (ref 25–35)
MCHC RBC AUTO-ENTMCNC: 33.5 G/DL (ref 31–37)
MCV RBC AUTO: 84.7 FL (ref 80–105)
P-ANCA ATYPICAL TITR SER IF: (no result) {TITER}
P-ANCA TITR SER IF: (no result) {TITER}
PLATELET # BLD: 198 10^3/UL (ref 120–450)
PMV BLD AUTO: 10.3 FL (ref 7–11)
RBC # BLD AUTO: 3.07 10^6/UL (ref 3.5–6.1)
WBC # BLD AUTO: 21.8 10^3/UL (ref 4.5–11)

## 2019-02-09 PROCEDURE — 5A1D70Z PERFORMANCE OF URINARY FILTRATION, INTERMITTENT, LESS THAN 6 HOURS PER DAY: ICD-10-PCS

## 2019-02-09 RX ADMIN — NYSTATIN SCH APPLIC: 100000 CREAM TOPICAL at 13:03

## 2019-02-09 RX ADMIN — Medication SCH TAB: at 13:03

## 2019-02-09 RX ADMIN — METOPROLOL SUCCINATE SCH MG: 25 TABLET, EXTENDED RELEASE ORAL at 13:03

## 2019-02-09 RX ADMIN — NYSTATIN SCH APPLIC: 100000 CREAM TOPICAL at 15:51

## 2019-02-09 RX ADMIN — PANTOPRAZOLE SODIUM SCH MG: 40 TABLET, DELAYED RELEASE ORAL at 17:24

## 2019-02-09 RX ADMIN — NYSTATIN SCH APPLIC: 100000 CREAM TOPICAL at 17:29

## 2019-02-09 NOTE — PN
DATE:  02/09/2019



SUBJECTIVE:  The patient is in bed in no acute distress, nontoxic.



PHYSICAL EXAMINATION:

VITAL SIGNS:  Temperature is 98, blood pressure is 95/59, respiratory rate

of 18.

HEENT:  Unremarkable.

NECK:  Supple.

LUNGS:  Have decreased breath sounds.

HEART:  Normal S1, S2.

ABDOMEN:  Soft.



LABORATORY EXAMINATION:  Reveals a white count is 21,000, hemoglobin of

8.7.  Creatinine is 8.6.  Urinalysis is noted and immunology is reviewed. 

Serology is reviewed.  Microbiology; blood cultures are no growth.  Stool

for C. diff toxin and antigen is negative, naris smears and cultures are

negative for MRSA and urine cultures are also negative.  Review of orders

reveals the patient to be on cefepime, started by _____.  The patient's

chest x-ray from yesterday reveals no pulmonary disease.  Dr. Eran Osman's note is reviewed from yesterday.



ASSESSMENT AND PLAN:  This is a 68-year-old male with systemic inflammatory

response syndrome secondary to acute renal failure, left-sided obstructive

uropathy, status post left ureteral stent placement, rule out acute

coronary syndrome and no evidence of infections.  Dr. Lloyd discontinue the

cefepime after 4 days of cefepime.  All cultures are negative.  The

patient's nontoxic verbalizing review of medications.  The patient's WBCs

are elevated to 21,000.  LFTs are normal.  Since the patient is nontoxic,

we will discontinue the cefepime.  Follow the WBCs.  Check on the pan

repeat pancultures.  We will follow the patient off of antibiotics.







__________________________________________

Bob Cruz MD





DD:  02/09/2019 10:43:19

DT:  02/09/2019 10:46:28

Job # [id number]

## 2019-02-09 NOTE — PN
SUBJECTIVE:  

The patient was seen and examined at bedside on the telemetry gibson.  No acute 
events overnight.  He remains afebrile and hemodynamically stable.



OBJECTIVE:

VITAL SIGNS:  Temperature 97.8, pulse 77, blood pressure 95/59, respiratory rate
19, oxygen saturation 97% on 2L NC.

GENERAL:  Obese man lying in bed in no apparent distress.

HEENT:  PERRL, EOMI.  No scleral icterus.  Conjunctival pallor is noted.

NECK:  No JVD.

LUNGS:  Decreased breath sounds at bases.

CARDIOVASCULAR:  Regular rate and rhythm.  Normal S1 and S2.  Grade II/VI murmur
to LLSB. No friction rub.

ABDOMEN:  Normoactive bowel sounds, soft, nontender, nondistended.

EXTREMITIES:  Trace pedal edema bilaterally.

NEUROLOGIC:  Awake, alert and oriented x 3.  No focal motor deficits.



LABORATORY DATA:  

WBC 21, hemoglobin 8.7, hematocrit 26, platelets 198. 

Sodium 136, potassium 3.3, chloride 97, bicarb 26, , creatinine 8.6, 
glucose 128. 

Blood culture with no growth to date.  

Urine culture with no growth to date.  

Clostridium difficile negative.



ASSESSMENT:  

The patient is a 68-year-old man who was initially admitted to the ICU for 
management of ROXANE on CKD stage III, anion gap metabolic acidosis, SIRS syndrome 
and anemia of unclear etiology who is now s/p transfer to the telemetry gibson and
demonstrating clinical improvement.



PLAN:

1.  ROXANE on CKD stage III.  Input from Dr. Cardoso noted.  The patient remains on 
renal replacement therapy. Continue with care as per Dr. Cardoso.

2.  Elevated troponin, consider secondary to impaired renal clearance, less 
likely NSTEMI.  Input from Dr. Garay noted.  The patient remains chest pain 
free.  Continue with conservative medical management.  The patient will 
eventually require stress testing.

3.  Anion gap metabolic acidosis, resolved.  Continue with care as per Dr. Cardoso.

4.  Obstructive nephrolithiasis s/p ureteral stent placement.  Continue with 
Flomax 0.4 mg p.o. daily.  Continue with care as per Dr. López.

5.  SIRS syndrome.  Input from Dr. Lloyd noted.  Blood and urine cultures remain
negative.  Continue with current antimicrobials as per Dr. Lloyd.

6.  Normocytic anemia s/p transfusion of 2 units PRBCs.  Input from Dr. Soria
noted.  Continue to monitor CBC and transfuse as needed.

7.  Elevated BNP, etiology likely secondary to underlying renal dysfunction.  
TTE demonstrates no wall motion abnormality or valvular dysfunction.  

8.  Prophylaxis.  Continue Protonix for GI prophylaxis and Heparin for DVT 
prophylaxis.





CODE STATUS:  Full Code.





__________________________________________

Anatoliy Osman MD





DD:  02/09/2019 14:08:48

DT:  02/09/2019 14:11:27

Job # [id number]



[email or web address]

## 2019-02-09 NOTE — PN
DATE:  02/08/2019



SUBJECTIVE:  The patient remains in the Intensive Care Unit.



PHYSICAL EXAMINATION:

VITAL SIGNS:  He is afebrile, /73, pulse of 88, respirations 18.

ABDOMEN:  Benign.  Reyes draining clear urine.



IMPRESSION AND PLAN:  The patient with acute renal failure, left ureteral

calculus, status post stent placement.  The patient's blood cultures have

shown no growth.  His urine culture showed no growth from 02/05/2019.  Does

not appear to have urosepsis.  The cause of his acute renal failure was

still undetermined.  The patient went for renal scan today, and we will

review the results.  Urologically, I would continue the patient with Reyes

catheter drainage.  The stent is in place.  When the patient has medically

improved, we can consider a ureteroscopy with laser lithotripsy and stone

removal, but we need to discuss this with his medical attending as well as

Nephrology to see if the patient's renal function is going to recover with

conservative measures.







__________________________________________

Raymond López MD





DD:  02/08/2019 15:42:25

DT:  02/08/2019 15:44:03

Job # [id number]

## 2019-02-10 LAB
ALBUMIN SERPL-MCNC: 3.1 G/DL (ref 3–4.8)
ALBUMIN/GLOB SERPL: 1 {RATIO} (ref 1.1–1.8)
ALT SERPL-CCNC: 34 U/L (ref 7–56)
AST SERPL-CCNC: 35 U/L (ref 17–59)
BASOPHILS # BLD AUTO: 0.02 K/MM3 (ref 0–2)
BASOPHILS NFR BLD: 0.1 % (ref 0–3)
BUN SERPL-MCNC: 67 MG/DL (ref 7–21)
CALCIUM SERPL-MCNC: 7.4 MG/DL (ref 8.4–10.5)
EOSINOPHIL # BLD: 0.6 10*3/UL (ref 0–0.7)
EOSINOPHIL NFR BLD: 3.3 % (ref 1.5–5)
ERYTHROCYTE [DISTWIDTH] IN BLOOD BY AUTOMATED COUNT: 13.9 % (ref 11.5–14.5)
GFR NON-AFRICAN AMERICAN: 8
HGB BLD-MCNC: 8.1 G/DL (ref 14–18)
LYMPHOCYTES # BLD: 1.5 10*3/UL (ref 1.2–3.4)
LYMPHOCYTES NFR BLD AUTO: 8.2 % (ref 22–35)
MCH RBC QN AUTO: 27.8 PG (ref 25–35)
MCHC RBC AUTO-ENTMCNC: 32.1 G/DL (ref 31–37)
MCV RBC AUTO: 86.6 FL (ref 80–105)
MONOCYTES # BLD AUTO: 1.3 10*3/UL (ref 0.1–0.6)
MONOCYTES NFR BLD: 6.8 % (ref 1–6)
PLATELET # BLD: 214 10^3/UL (ref 120–450)
PMV BLD AUTO: 10.1 FL (ref 7–11)
RBC # BLD AUTO: 2.91 10^6/UL (ref 3.5–6.1)
WBC # BLD AUTO: 18.6 10^3/UL (ref 4.5–11)

## 2019-02-10 RX ADMIN — PANTOPRAZOLE SODIUM SCH MG: 40 TABLET, DELAYED RELEASE ORAL at 17:02

## 2019-02-10 RX ADMIN — PANTOPRAZOLE SODIUM SCH MG: 40 TABLET, DELAYED RELEASE ORAL at 05:06

## 2019-02-10 RX ADMIN — NYSTATIN SCH: 100000 CREAM TOPICAL at 14:00

## 2019-02-10 RX ADMIN — METOPROLOL SUCCINATE SCH: 25 TABLET, EXTENDED RELEASE ORAL at 07:51

## 2019-02-10 RX ADMIN — NYSTATIN SCH APPLIC: 100000 CREAM TOPICAL at 09:06

## 2019-02-10 RX ADMIN — NYSTATIN SCH APPLIC: 100000 CREAM TOPICAL at 17:11

## 2019-02-10 RX ADMIN — Medication SCH TAB: at 08:52

## 2019-02-10 NOTE — PN
SUBJECTIVE:  

The patient was seen and examined at bedside on the telemetry gibson.  No acute 
events overnight.  He remains afebrile, hemodynamically stable and is tolerating
renal replacement therapy without difficulty.



OBJECTIVE:

VITAL SIGNS:  Temperature 98.4, pulse 84, blood pressure 102/54, respiratory 
rate 20, oxygen saturation 95% on room air.

GENERAL:  Obese man, lying in bed, in no apparent distress.

HEENT:  PERRL, EOMI.  No scleral icterus.  Conjunctival pallor is noted.

NECK:  No JVD.

LUNGS:  Decreased breath sounds at the bases.

CARDIOVASCULAR:  Regular rate and rhythm.  Normal S1 and S2.  Grade II/VI murmur
to LLSB. No friction rub.

ABDOMEN:  Normoactive bowel sounds, soft, nontender, nondistended.

EXTREMITIES:  Trace pedal edema bilaterally.

NEUROLOGIC:  Awake, alert and oriented x 3.  No focal motor deficits.



LABORATORY DATA:  

WBC 18.6 with 82% neutrophils, hemoglobin 8, hematocrit 25, platelets 214. 

Sodium 135, potassium 3.6, chloride 99, bicarb 27, BUN 67, creatinine 6.6, 
glucose 114.  

Blood cultures with no growth to date. 

Urine culture no growth to date.  

C. diff negative.



ASSESSMENT:  

The patient is a 68-year-old man who was initially admitted to the ICU for 
management of ROXANE on CKD stage III, anion gap metabolic acidosis, SIRS syndrome 
and anemia of unclear etiology who is now s/p transfer to the telemetry gibson and
demonstrating gradual clinical improvement.



PLAN:

1.  ROXANE on CKD stage III, consider secondary to obstructive uropathy vs chronic 
NSAID use.  Input from Dr. Rodriguez noted.  The patient is tolerating renal 
replacement therapy without difficulty.  Continue with care as per Dr. Rodriguez.

2.  Elevated troponin, consider secondary to impaired renal clearance, less 
likely NSTEMI.  Input from Dr. Garay noted.  The patient remains chest pain 
free.  Continue with conservative medical management.  He will eventually 
require stress testing.

3.  Anion gap metabolic acidosis, resolved.  Continue with care as per Dr. Rodriguez.

4.  Obstructive nephrolithiasis s/p ureteral stent placement.  Continue with 
Flomax 0.4 mg p.o. daily.  Continue with care as per Dr. López.

5.  SIRS syndrome.  Input from Dr. Cruz noted.  The patient remains 
afebrile and off antimicrobials.  Cultures remain negative.

6.  Normocytic anemia s/p transfusion of 2 units PRBCs.  The patient remains on 
Protonix 40 mg p.o. b.i.d.  GI evaluation ongoing with Dr. Soria.

7.  Elevated BNP, etiology likely secondary to underlying renal dysfunction.  
TTE without wall motion abnormality or valvular dysfunction.

8.  Prophylaxis.  Continue Protonix for GI prophylaxis and Heparin for DVT 
prophylaxis.





CODE STATUS:  Full code.





__________________________________________

Anatoliy Osman MD





DD:  02/10/2019 11:20:34

DT:  02/10/2019 11:22:45

Job # [id number]



MTDD

## 2019-02-11 LAB
ALBUMIN SERPL-MCNC: 3.2 G/DL (ref 3–4.8)
ALBUMIN/GLOB SERPL: 1 {RATIO} (ref 1.1–1.8)
ALT SERPL-CCNC: 37 U/L (ref 7–56)
AST SERPL-CCNC: 39 U/L (ref 17–59)
BASOPHILS # BLD AUTO: 0 K/MM3 (ref 0–2)
BASOPHILS NFR BLD: 0 % (ref 0–3)
BUN SERPL-MCNC: 93 MG/DL (ref 7–21)
CALCIUM SERPL-MCNC: 7.4 MG/DL (ref 8.4–10.5)
EOSINOPHIL # BLD: 0 10*3/UL (ref 0–0.7)
EOSINOPHIL NFR BLD: 0.1 % (ref 1.5–5)
EOSINOPHIL NFR BLD: 1 % (ref 0–3)
ERYTHROCYTE [DISTWIDTH] IN BLOOD BY AUTOMATED COUNT: 13.4 % (ref 11.5–14.5)
GFR NON-AFRICAN AMERICAN: 6
HGB BLD-MCNC: 8.4 G/DL (ref 14–18)
LYMPHOCYTE: 1 % (ref 22–35)
LYMPHOCYTES # BLD: 0.5 10*3/UL (ref 1.2–3.4)
LYMPHOCYTES NFR BLD AUTO: 3.4 % (ref 22–35)
MCH RBC QN AUTO: 28.1 PG (ref 25–35)
MCHC RBC AUTO-ENTMCNC: 32.8 G/DL (ref 31–37)
MCV RBC AUTO: 85.6 FL (ref 80–105)
MONOCYTE: 1 % (ref 1–6)
MONOCYTES # BLD AUTO: 0.4 10*3/UL (ref 0.1–0.6)
MONOCYTES NFR BLD: 2.2 % (ref 1–6)
MYELOCYTES NFR BLD: 2 %
NEUTROPHILS NFR BLD AUTO: 95 % (ref 50–70)
PLATELET # BLD EST: NORMAL 10*3/UL
PLATELET # BLD: 220 10^3/UL (ref 120–450)
PMV BLD AUTO: 10.2 FL (ref 7–11)
RBC # BLD AUTO: 2.99 10^6/UL (ref 3.5–6.1)
WBC # BLD AUTO: 16 10^3/UL (ref 4.5–11)

## 2019-02-11 RX ADMIN — NYSTATIN SCH: 100000 CREAM TOPICAL at 18:17

## 2019-02-11 RX ADMIN — PANTOPRAZOLE SODIUM SCH MG: 40 TABLET, DELAYED RELEASE ORAL at 05:08

## 2019-02-11 RX ADMIN — PANTOPRAZOLE SODIUM SCH MG: 40 TABLET, DELAYED RELEASE ORAL at 18:16

## 2019-02-11 RX ADMIN — METOPROLOL SUCCINATE SCH MG: 25 TABLET, EXTENDED RELEASE ORAL at 08:59

## 2019-02-11 RX ADMIN — Medication SCH TAB: at 08:59

## 2019-02-11 RX ADMIN — NYSTATIN SCH: 100000 CREAM TOPICAL at 11:00

## 2019-02-11 RX ADMIN — NYSTATIN SCH: 100000 CREAM TOPICAL at 14:00

## 2019-02-11 NOTE — PN
SUBJECTIVE:  

The patient was seen and examined at bedside on the telemetry gibson.  No acute 
events overnight.  He remains afebrile and hemodynamically stable.  He is 
tolerating hemodialysis and arrangements are being made for PermCath placement.



OBJECTIVE:

VITAL SIGNS:  Temperature 97.4, pulse 82, blood pressure 149/70, respiratory 
rate 20, oxygen saturation 94% on room air.

GENERAL:  Obese man lying in bed in no apparent distress.

HEENT:  PERRL, EOMI.  No scleral icterus.  Conjunctival pallor is noted.

NECK:  No JVD.  Right IJ line in place with site appearing clean, dry and 
intact.

LUNGS:  Decreased breath sounds at the bases.

CARDIOVASCULAR:  Regular rate and rhythm.  Normal S1, S2.  Grade II/VI murmur to
LLSB.  No friction rub.

ABDOMEN:  Normoactive bowel sounds, soft, nontender, nondistended.

EXTREMITIES:  Trace pedal edema bilaterally.

NEUROLOGIC:  Awake, alert and oriented x 3.  No focal motor deficits.



LABORATORY DATA:  

WBC 16 with 94% neutrophils, hemoglobin 8.4, hematocrit 26, platelets 220. 

Sodium 134, potassium 4.3, chloride 97, bicarb 25, BUN 93, creatinine 8.8, 
glucose 239.  

Blood cultures (2/4/19) negative

blood cultures (2/8/19) with no growth to date.  

Urine culture negative. 

C. diff negative.



ASSESSMENT:  

The patient is a 68-year-old man who was initially admitted to ICU for the 
management of ROXANE on CKD stage III, anion gap metabolic acidosis, SIRS syndrome 
and anemia of unclear etiology who is now s/p transfer to the telemetry gibson and
demonstrating continued clinical improvement.



PLAN:

1  ROXANE on CKD stage III, consider etiology secondary to obstructive uropathy vs 
chronic NSAID use.  Input from Dr. Rodriguez noted.  Arrangements will be made for 
PermCath placement to facilitate continued hemodialysis.  Continue with care as 
per nephrology.

2.  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  Evaluation with Dr. Baker of 
Hematology/Oncology is pending.

3.  Elevated troponin, consider secondary to impaired renal clearance, less 
likely NSTEMI.  Input from Dr. Garay noted.  Continue with conservative 
medical management.  The patient will eventually require stress testing.

4.  Anion gap metabolic acidosis, resolved.  Continue with care as per 
nephrology.

5.  Obstructive nephrolithiasis s/p ureteral stent placement.  Continue with 
Flomax 0.4 mg p.o. daily.  Continue with care as per Dr. López.

6.  SIRS syndrome, resolved.  The patient remains off antimicrobials and with 
negative cultures.  We will continue to monitor for fever and leukocytosis.

7.  Normocytic anemia s/p transfusion of 2 units of PRBCs.  Labs demonstrate 
stable H/H.  Anemia workup is ongoing and evaluation is pending with Dr. Soria for possible EGD and/or colonoscopy.  Continue Protonix 40 mg p.o. 
b.i.d.

8.  Elevated BNP, etiology likely secondary to underlying renal dysfunction.  
TTE demonstrates no wall motion abnormality or valvular dysfunction.

9.  Prophylaxis.  Continue Protonix for GI prophylaxis and Heparin for DVT 
prophylaxis.





CODE STATUS:  Full code.





__________________________________________

Anatoliy Osman MD





DD:  02/11/2019 8:29:02

DT:  02/11/2019 8:31:02

Job # [id number]



MTDELIGIO

## 2019-02-11 NOTE — CON
DATE:  02/11/2019



REASON FOR CONSULTATION:  Abnormal immunofixation, anemia.



CONSULT REQUESTED BY:  Dr. Osman.



HISTORY OF PRESENT ILLNESS:  Mr. High is a 68-year-old male admitted to

the hospital with worsening dyspnea, pedal edema, orthopnea.  The patient

was found to be in acute renal failure.  White count was elevated to 23,000

on admission, hemoglobin was 9 and platelet count normal, hemoglobin

declined to 6.6.  He received 2 units of blood transfusion on 02/06/2019. 

His serum protein electrophoresis and immunofixation done.  Immunofixation

showed faint M-protein free light chain, free lambda and kappa lambda ratio

within normal limits 1.66.  He was started on hemodialysis.  He is being

treated with IV antibiotics.  White count has declined to 10,000 now,

initially 23,000.  Differential showed mainly neutrophils.



PAST MEDICAL HISTORY:  Osteoarthritis bilateral hip.



PAST SURGICAL HISTORY:  Bilateral total hip replacement, and cataract

removal.



ALLERGIES:  NO KNOWN DRUG ALLERGIES.



HOME MEDICATIONS:  None.



CURRENT MEDICATIONS:  DuoNeb, Lipitor 40 mg daily, PhosLo, _____, Aranesp

100 mg every week IV, indomethacin 50 mg p.o. t.i.d., Reglan 10 mg p.r.n.,

metoprolol 25 mg daily, nystatin, Protonix, Flomax and multivitamin.



FAMILY HISTORY:  Significant for hypertension and kidney disease, positive

in mother.



PERSONAL HISTORY:  Former smoker and former heavy alcohol abuse.



REVIEW OF SYSTEMS:  As per HPI.  Rest of 12-point review of systems are

reviewed and negative.



PHYSICAL EXAMINATION

GENERAL:  Comfortable in bed, in no acute distress.

VITAL SIGNS:  Temperature 97.4, heart rate is 82 per minute, blood pressure

149/70, respiratory rate 20 per minute and oxygen saturation 94% on room

air.

HEENT:  Pallor positive.

NECK:  No lymphadenopathy.

CHEST:  Air entry present and equal bilaterally.  No added sound.

CARDIOVASCULAR:  S1 and S2 normal.  No murmur.  No gallop.

ABDOMEN:  Soft and nontender.  No hepatosplenomegaly.

EXTREMITIES:  Trace bilateral petal edema.

NEUROLOGIC:  Alert and oriented x3.  No focal sensory motor deficit.

SPINE:  Nontender.



LABORATORY DATA:  White count 10.1, hemoglobin 8.3, hematocrit 23.6 and

platelets 166.  Sodium 137, potassium 3.2, creatinine 10.  Serum

immunofixation showed faint free lambda band present.  Troponin declining

trend 1.36.



ASSESSMENT AND PLAN:

1.  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

chain.  Lambda light chain has 179, kappa 297, kappa-lambda ratio 1.66

within normal limits.  Quantitative lambda light chains are not elevated. 

Light chain _____ likely to be the reason for acute renal failure, but we

will do skeletal survey once stable to rule lytic lesions.  No M-protein on

immunofixation.

2.  Leukocytosis likely due to sepsis, white count declined to 10,000 from

23,000.  Differential CBC within normal limits mainly granulocytosis.  No

further workup needed at this point.

3.  Acute renal disease.  Currently on hemodialysis and Aranesp IV.  I

would recommend to give Aranesp subcutaneously instead of IV.  Iron studies

showed high iron.  We will repeat the iron studies, if iron is low, we will

replete the iron.  Elevated iron might be related to recent blood

transfusion.  We will continue to follow.



Thank you Dr. Osman for allowing us to participate in  [facility].







__________________________________________

Nalini Baker MD





DD:  02/11/2019 7:56:31

DT:  02/11/2019 8:42:02

Job # [id number]

## 2019-02-11 NOTE — CP.PCM.APN
Subjective





- Date & Time of Evaluation


Date of Evaluation: 02/11/19


Time of Evaluation: 12:15





- Subjective


Subjective: 





Pt. seen sitting up in chair at bedside. no complaints.





Objective





- Vital Signs/Intake and Output


Vital Signs (last 24 hours): 


                                        











Temp Pulse Resp BP Pulse Ox


 


 98.1 F   85   18   110/65   94 L


 


 02/11/19 12:00  02/11/19 12:00  02/11/19 12:00  02/11/19 12:00  02/11/19 00:01








Intake and Output: 


                                        











 02/11/19 02/11/19





 06:59 18:59


 


Intake Total 960 


 


Output Total 675 


 


Balance 285 














- Medications


Medications: 


                               Current Medications





Albuterol/Ipratropium (Duoneb 3 Mg/0.5 Mg (3 Ml) Ud)  3 ml IH Q2H PRN


   PRN Reason: Shortness of Breath


Atorvastatin Calcium (Lipitor)  40 mg PO DIN [facility]


   Last Admin: 02/10/19 17:09 Dose:  40 mg


Calcium Acetate (Phoslo)  2,001 mg PO WM [facility]


   Last Admin: 02/11/19 12:15 Dose:  Not Given


Darbepoetin Balta (Aranesp)  100 mcg IVP QWK [facility]


   Last Admin: 02/05/19 13:50 Dose:  100 mcg


Ergocalciferol (Drisdol 50,000 Intl Units Cap)  1 cap PO Q7D [facility]


   Last Admin: 02/06/19 17:47 Dose:  1 cap


Metoprolol Succinate (Toprol Xl)  25 mg PO BRK [facility]


   Last Admin: 02/11/19 08:59 Dose:  25 mg


Nystatin (Mycostatin Cream)  0 ea TOP TID [facility]


   Last Admin: 02/11/19 11:00 Dose:  Not Given


Pantoprazole Sodium (Protonix Ec Tab)  40 mg PO 0600,1600 [facility]


   Last Admin: 02/11/19 05:08 Dose:  40 mg


Tamsulosin HCl (Flomax)  0.4 mg PO DAILY [facility]


   Last Admin: 02/11/19 11:15 Dose:  0.4 mg


Vitamin B Complex/Vit C/Folic Acid (Nephro-Seda)  1 tab PO 0800 [facility]


   Last Admin: 02/11/19 08:59 Dose:  1 tab











- Labs


Labs: 


                                        





                                 02/11/19 06:00 





                                 02/11/19 06:00 





                                        











PT  15.0 SECONDS (9.4-12.5)  H  02/06/19  06:00    


 


INR  1.33   02/06/19  06:00    


 


APTT  33.8 Seconds (26.9-38.3)   02/06/19  06:00    














- Constitutional


Appears: Well, Non-toxic





- Head Exam


Head Exam: NORMOCEPHALIC





- Eye Exam


Eye Exam: Normal appearance





- Neck Exam


Neck Exam: Full ROM





- Respiratory Exam


Respiratory Exam: Clear to Ausculation Bilateral, NORMAL BREATHING PATTERN





- Cardiovascular Exam


Cardiovascular Exam: REGULAR RHYTHM, +S1, +S2





- GI/Abdominal Exam


GI & Abdominal Exam: Soft, Normal Bowel Sounds





- Rectal Exam


Rectal Exam: Deferred





- Extremities Exam


Extremities Exam: Full ROM


Additional comments: 





right leg with dressing noted to lower leg,+1 edema noted.





- Neurological Exam


Neurological Exam: Alert, Awake, Oriented x3





- Psychiatric Exam


Psychiatric exam: Normal Affect, Normal Mood





- Skin


Skin Exam: Normal Color, Warm





Assessment and Plan





- Assessment and Plan (Free Text)


Assessment: 





Assessment:


68-year-old man with a past medical history that includes osteoarthritis of 

hips, total hip replacement came to the emergency room with complaints of 

worsening dyspnea, and pedal edema. On evaluation he was found to leukocytosis 

and in acute renal failure. , creatinine 18, WBC 23. He did report 

decrease urine output. He is reported to poor to follow up, admitted with 

sepsis, and acute renal failure, with ID and Renal consults in place, for 

further treatment.





Plan:





1.  Sepsis   


   - SIRS r/t ARF as per I.D, antibiotics per I.D, UA pos, urine culture 

pending.


   Leukocytosis improving, WBC 23 on admit, today 16, Antibx as per I.D, trend 

Wbc.





2.  UTI


   Urine culture resulted neg, repeat blood cx neg.


   -Antibx now off per I.D.





3.  ARF


   Most likely r/t dehydration, vs.recent NSAID use, vs. obstructive, with 

obstructing ureter stone on imaging.


   NA bicarb gtt stopped and HD as per nephro, trend Bun/Creatinine


   -Monitor Is Os.,for Perma cath insertion 





4.  left sided Hydronephrosis


   -most likely r/t obstructing left ureter stone,s/p cystogram, left ureter 

stent placement.





5.  Anemia


   -? Anemia, secondary to kidney disease


   monitor h/h, transfused 2 units PRBCs.


   R/o GI source, Stool O.B pending, no GI workup recommended.





6.  Elevated Trop


   Card. consulted, manage conservatively, no cath at present.





7.  Fluid overload, w.elevated BNP, 


   Likely secondary to acute kidney failure


   Monitor I's O.s, dialysis as per nephro











Will continue to monitor clinical status and follow clinically.


Will d/w consultants and PMD.





   -

## 2019-02-12 LAB
% IRON SATURATION: 13 % (ref 20–55)
ALBUMIN SERPL-MCNC: 3.1 G/DL (ref 3–4.8)
ALBUMIN/GLOB SERPL: 1 {RATIO} (ref 1.1–1.8)
ALT SERPL-CCNC: 48 U/L (ref 7–56)
AST SERPL-CCNC: 56 U/L (ref 17–59)
BASOPHILS # BLD AUTO: 0.01 K/MM3 (ref 0–2)
BASOPHILS NFR BLD: 0.1 % (ref 0–3)
BUN SERPL-MCNC: 115 MG/DL (ref 7–21)
CALCIUM SERPL-MCNC: 7.5 MG/DL (ref 8.4–10.5)
EOSINOPHIL # BLD: 0.6 10*3/UL (ref 0–0.7)
EOSINOPHIL NFR BLD: 3.5 % (ref 1.5–5)
ERYTHROCYTE [DISTWIDTH] IN BLOOD BY AUTOMATED COUNT: 13.3 % (ref 11.5–14.5)
FERRITIN SERPL-MCNC: 707 NG/ML
GFR NON-AFRICAN AMERICAN: 5
HGB BLD-MCNC: 7.8 G/DL (ref 14–18)
IRON SERPL-MCNC: 22 UG/DL (ref 45–180)
LYMPHOCYTES # BLD: 1.5 10*3/UL (ref 1.2–3.4)
LYMPHOCYTES NFR BLD AUTO: 8.7 % (ref 22–35)
MCH RBC QN AUTO: 28.4 PG (ref 25–35)
MCHC RBC AUTO-ENTMCNC: 33.2 G/DL (ref 31–37)
MCV RBC AUTO: 85.5 FL (ref 80–105)
MONOCYTES # BLD AUTO: 1.3 10*3/UL (ref 0.1–0.6)
MONOCYTES NFR BLD: 7.6 % (ref 1–6)
PLATELET # BLD: 227 10^3/UL (ref 120–450)
PMV BLD AUTO: 10.1 FL (ref 7–11)
RBC # BLD AUTO: 2.75 10^6/UL (ref 3.5–6.1)
TIBC SERPL-MCNC: 176 UG/DL (ref 261–462)
WBC # BLD AUTO: 16.6 10^3/UL (ref 4.5–11)

## 2019-02-12 PROCEDURE — B543ZZA ULTRASONOGRAPHY OF RIGHT JUGULAR VEINS, GUIDANCE: ICD-10-PCS | Performed by: RADIOLOGY

## 2019-02-12 PROCEDURE — 0JH63XZ INSERTION OF TUNNELED VASCULAR ACCESS DEVICE INTO CHEST SUBCUTANEOUS TISSUE AND FASCIA, PERCUTANEOUS APPROACH: ICD-10-PCS | Performed by: RADIOLOGY

## 2019-02-12 PROCEDURE — 5A1D70Z PERFORMANCE OF URINARY FILTRATION, INTERMITTENT, LESS THAN 6 HOURS PER DAY: ICD-10-PCS

## 2019-02-12 PROCEDURE — 02H633Z INSERTION OF INFUSION DEVICE INTO RIGHT ATRIUM, PERCUTANEOUS APPROACH: ICD-10-PCS | Performed by: RADIOLOGY

## 2019-02-12 RX ADMIN — PANTOPRAZOLE SODIUM SCH: 40 TABLET, DELAYED RELEASE ORAL at 05:28

## 2019-02-12 RX ADMIN — Medication SCH TAB: at 15:01

## 2019-02-12 RX ADMIN — NYSTATIN SCH APPLIC: 100000 CREAM TOPICAL at 18:03

## 2019-02-12 RX ADMIN — METOPROLOL SUCCINATE SCH MG: 25 TABLET, EXTENDED RELEASE ORAL at 15:11

## 2019-02-12 RX ADMIN — PANTOPRAZOLE SODIUM SCH MG: 40 TABLET, DELAYED RELEASE ORAL at 17:59

## 2019-02-12 RX ADMIN — NYSTATIN SCH APPLIC: 100000 CREAM TOPICAL at 15:02

## 2019-02-12 RX ADMIN — NYSTATIN SCH APPLIC: 100000 CREAM TOPICAL at 17:59

## 2019-02-12 RX ADMIN — NYSTATIN SCH: 100000 CREAM TOPICAL at 14:38

## 2019-02-12 NOTE — VASCULAR
PROCEDURE:  Ultrasound and fluoroscopic tunneled right IJ dialysis 

catheter.



CLINICAL HISTORY:  ESRD



PHYSICIAN(S):  Jona Alegre M.D.



TECHNIQUE:

The relative risks and indications for the procedure were explained 

to the patient and informed written consent obtained. The patient was 

placed supine on the arteriography table and the right neck/chest was 

prepped and draped in the usual sterile fashion. 1% Xylocaine was 

used to anesthetize the skin and soft tissues at the puncture site. 

Conscious sedation and monitoring were provided throughout the 

procedure by a nurse.



Under direct ultrasound guidance, the rightinternal jugular vein was 

punctured with a micropuncture set. A 0.035 Glidewire was advanced 

into the IVC. Sequential dilatation was performed with subsequent 

placement of a 28cm Nextgen catheter with its tip in the right 

atrium. A retrograde tunnel below the right clavicle was performed. 

The catheter was trimmed and the hub attached. Both ports aspirate 

and inject easily. The catheter was secured and a dressing applied. 

The patient tolerated the procedure well. 



IMPRESSION:

1. Ultrasound and fluoroscopically placed right IJ tunneled dialysis 

catheter.



2.  There is a small amount of adherent thrombus in the right 

internal jugular vein, presumably related to the prior catheter.  The 

patient should be placed on low-dose anticoagulation for 3-6 months

## 2019-02-12 NOTE — PN
SUBJECTIVE:  

The patient was seen and examined at bedside on the telemetry gibson.  No acute 
events overnight.  He remains afebrile and hemodynamically stable.  He is 
tolerating hemodialysis and is scheduled for PermCath placement today.



PHYSICAL EXAMINATION

VITAL SIGNS:  Temperature 98.3, pulse 73, blood pressure 125/68, respiratory 
rate 19, oxygen saturation 96% on room air.

GENERAL:  Obese man, lying in bed, in no apparent distress.

HEENT:  PERRL, EOMI.  No scleral icterus.  Conjunctival pallor is noted.

NECK:  No JVD.  Right IJ in place with site appearing clean, dry and intact.

LUNGS:  Decreased breath sounds at the bases.

CARDIOVASCULAR:  Regular rate and rhythm.  Normal S1, S2.  Grade II/VI murmur to
LLSB.

ABDOMEN:  Normoactive bowel sounds, soft, nontender, nondistended.

EXTREMITIES:  No edema.

NEUROLOGIC:  Awake, alert and oriented x 3.  No focal motor deficits.



LABORATORY DATA:  

WBC 16.6 with 80% neutrophils, hemoglobin 7.8, hematocrit 24, platelets 227.  

Sodium 135, potassium 3.7, chloride 98, bicarb 23, , creatinine 9.8, 
glucose 126.  

Stool occult blood negative.  

Blood cultures negative.  

Urine culture negative.



ASSESSMENT:  

The patient is a 68-year-old man who was initially admitted to the ICU for 
management of ROXANE on CKD stage III, anion gap metabolic acidosis and SIRS 
syndrome who is now s/p transfer to the telemetry gibson.



PLAN:

1.  ROXANE and CKD stage III, consider etiology secondary to obstructive uropathy 
vs chronic NSAID use.  Input from Dr. Rodriguez noted.  The patient is scheduled 
for PermCath placement today to facilitate continued hemodialysis.  Continue 
with care as per Nephrology.

2.  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  Input from Dr. Baker noted.  
Continue with care as per Dr. Baker.

3.  Elevated troponin, consider secondary to impaired renal clearance, less 
likely NSTEMI.  Input from Dr. Garay noted.  Continue with conservative 
medical management.  The patient will eventually require stress testing.

4.  Normocytic anemia s/p transfusion of 2 units of PRBCs.  Anemia workup is 
ongoing and the patient will likely require EGD and/or colonoscopy.  Continue 
Protonix 40 mg p.o. b.i.d.

5.  Obstructive nephrolithiasis s/p ureteral stent placement.  Continue Flomax 
0.4 mg p.o. daily.  Continue with care as per Dr. López.

6.  Anion gap metabolic acidosis, resolved.

7.  SIRS syndrome, resolved.  The patient remains off antimicrobials with 
negative cultures.

8.  Elevated BNP, etiology likely secondary to underlying renal dysfunction.  
TTE demonstrates no wall motion abnormality or valvular dysfunction.

9.  Prophylaxis.  Continue Protonix for GI prophylaxis and Heparin for DVT 
prophylaxis.





CODE STATUS:  Full code.





__________________________________________

Anatoliy Osman MD





DD:  02/12/2019 8:22:23

DT:  02/12/2019 8:24:34

Job # [id number]

MTDD

## 2019-02-12 NOTE — PN
DATE:  02/12/2019



SUBJECTIVE:  He is comfortable in bed, in no acute distress.  No fever.  No

cough with expectoration.  Currently, undergoing hemodialysis.  Hemoglobin

declined to 7.8.  Repeat iron studies showed low iron of 22 and iron

saturation 13%.



REVIEW OF SYSTEMS:  As per HPI.  Rest of 12-point review of systems

reviewed and negative.



PHYSICAL EXAMINATION

GENERAL:  Comfortable in bed, in no acute distress.

VITAL SIGNS:  Temperature 97.8, heart rate 82 per minute, blood pressure

130/80, respiratory rate 18 per minute and oxygen saturation 95% on room

air.

HEENT:  No lymphadenopathy.

CHEST:  Air entry present and equal bilaterally.  No added sound.

CARDIOVASCULAR:  S1 and S2 normal.  No murmur.  No gallop.

ABDOMEN:  Soft and nontender.  No hepatosplenomegaly.

EXTREMITY:  Bilateral trace edema.

NEUROLOGIC:  Alert and oriented x3.  No focal, sensory or motor deficit.



LABORATORY DATA:  Sodium 135, potassium 3.7, creatinine 9.8, calcium 7.5,

iron 22, and iron saturation 13%.  White count 16.6, hemoglobin 7.8,

hematocrit 23.5 and platelets 227.



MEDICATIONS:  Calcium, PhosLo, metoprolol 25 mg daily, nystatin, Protonix,

Flomax 0.4 mg daily, B complex and DuoNeb.



ASSESSMENT AND PLAN:

1. acute on chronic kidney disease.  Currently on

hemodialysis.

2.  Light chain MGUS, it is not likely to be the cause of acute renal

failure because quantitative light chain are slightly elevated, kappa

lambda light chain ratio is within normal limits.

3.  Anemia, multifactorial related to chronic kidney disease, iron

deficiency.  We will recommend 100 mg IV iron daily for 3 days.  If

hemoglobin does not improve PRBC transfusion as indicated.



Thank you Dr. Osman for allowing us to participate in Mr. High's

care.







__________________________________________

Nalini Baker MD





DD:  02/12/2019 7:42:58

DT:  02/12/2019 8:11:33

Job # [id number]

[email or web address]

## 2019-02-12 NOTE — CP.PCM.PCO
Physician Communication Note





- Physician Communication Note


Physician Communication Note: Pt.s/p permacath inserted,hD today,hgb7.8,plan dc 

2/13,if H/H stable tomm

## 2019-02-12 NOTE — PN
DATE:  02/12/2019



TIME:  9.36 a.m.



I placed a tunneled dialysis catheter via right IJ approach without

complication.  Ultrasound revealed small amount of adherent nonocclusive

thrombus, presumably from the prior catheter.



Once the patient's GI bleeding issue has been evaluated and stabilized, I

would like the patient on low dose anticoagulation for approximately 3

months to prevent any additional propagation in the right internal jugular

vein.  Consider Eliquis 2.5 mg every day.







__________________________________________

Jona Alegre MD





DD:  02/12/2019 9:37:11

DT:  02/12/2019 11:26:38

Job # [id number]

## 2019-02-13 VITALS — DIASTOLIC BLOOD PRESSURE: 65 MMHG | RESPIRATION RATE: 18 BRPM | HEART RATE: 90 BPM | SYSTOLIC BLOOD PRESSURE: 102 MMHG

## 2019-02-13 VITALS — TEMPERATURE: 98.1 F | OXYGEN SATURATION: 100 %

## 2019-02-13 LAB
ALBUMIN SERPL-MCNC: 3.1 G/DL (ref 3–4.8)
ALBUMIN/GLOB SERPL: 1 {RATIO} (ref 1.1–1.8)
ALT SERPL-CCNC: 49 U/L (ref 7–56)
AST SERPL-CCNC: 61 U/L (ref 17–59)
BASOPHILS # BLD AUTO: 0.01 K/MM3 (ref 0–2)
BASOPHILS NFR BLD: 0.1 % (ref 0–3)
BUN SERPL-MCNC: 70 MG/DL (ref 7–21)
CALCIUM SERPL-MCNC: 7.8 MG/DL (ref 8.4–10.5)
EOSINOPHIL # BLD: 0.6 10*3/UL (ref 0–0.7)
EOSINOPHIL NFR BLD: 4.8 % (ref 1.5–5)
ERYTHROCYTE [DISTWIDTH] IN BLOOD BY AUTOMATED COUNT: 13.4 % (ref 11.5–14.5)
GFR NON-AFRICAN AMERICAN: 9
HGB BLD-MCNC: 8.1 G/DL (ref 14–18)
LYMPHOCYTES # BLD: 0.9 10*3/UL (ref 1.2–3.4)
LYMPHOCYTES NFR BLD AUTO: 7.8 % (ref 22–35)
MCH RBC QN AUTO: 27.9 PG (ref 25–35)
MCHC RBC AUTO-ENTMCNC: 32.1 G/DL (ref 31–37)
MCV RBC AUTO: 86.9 FL (ref 80–105)
MONOCYTES # BLD AUTO: 1.4 10*3/UL (ref 0.1–0.6)
MONOCYTES NFR BLD: 11.9 % (ref 1–6)
PLATELET # BLD: 220 10^3/UL (ref 120–450)
PMV BLD AUTO: 10 FL (ref 7–11)
RBC # BLD AUTO: 2.9 10^6/UL (ref 3.5–6.1)
WBC # BLD AUTO: 11.7 10^3/UL (ref 4.5–11)

## 2019-02-13 RX ADMIN — PANTOPRAZOLE SODIUM SCH MG: 40 TABLET, DELAYED RELEASE ORAL at 18:09

## 2019-02-13 RX ADMIN — Medication SCH TAB: at 09:36

## 2019-02-13 RX ADMIN — NYSTATIN SCH APPLIC: 100000 CREAM TOPICAL at 09:37

## 2019-02-13 RX ADMIN — NYSTATIN SCH APPLIC: 100000 CREAM TOPICAL at 14:07

## 2019-02-13 RX ADMIN — PANTOPRAZOLE SODIUM SCH MG: 40 TABLET, DELAYED RELEASE ORAL at 05:43

## 2019-02-13 RX ADMIN — METOPROLOL SUCCINATE SCH MG: 25 TABLET, EXTENDED RELEASE ORAL at 09:36

## 2019-02-13 RX ADMIN — ERGOCALCIFEROL SCH CAP: 1.25 CAPSULE ORAL at 14:06

## 2019-02-13 RX ADMIN — NYSTATIN SCH APPLIC: 100000 CREAM TOPICAL at 18:10

## 2019-02-13 NOTE — PN
SUBJECTIVE:  

The patient was seen and examined at bedside on the general medical gibson.  No 
acute events overnight.  He remains afebrile, hemodynamically stable and 
tolerated his PermCath placement without difficulty.  The patient has also been 
accepted to subacute rehab at [facility] and is medically cleared for 
discharge.



OBJECTIVE:

VITAL SIGNS:  Temperature 98.1, pulse 86, blood pressure 106/63, respiratory 
rate 19, oxygen saturation 100% on room air.

GENERAL:  Obese man lying in bed in no apparent distress.

HEENT:  PERRL, EOMI.  No scleral icterus.  Conjunctival pallor is noted.

NECK:  No JVD.

LUNGS:  Clear to auscultation.

CARDIOVASCULAR:  Regular rate and rhythm.  Normal S1, S2.  Grade II/VI murmur to
LLSB.

ABDOMEN:  Normoactive bowel sounds, soft, nontender, nondistended.

EXTREMITIES:  No edema.

NEUROLOGIC:  Awake, alert and oriented x 3.  No focal motor deficits.



LABORATORY DATA:  

WBC 11.7 with 75% neutrophils, hemoglobin 8, hematocrit 25, platelets 220.  

Sodium 137, potassium 4.4, chloride 99, bicarb 28, BUN 70, creatinine 6.5, 
glucose 101.



ASSESSMENT:  

The patient is a 68-year-old man who was initially admitted to ICU for 
management of ROXANE on CKD stage III, anion gap metabolic acidosis and SIRS 
syndrome.



PLAN:

1.  ROXANE on CKD stage III, consider secondary to obstructive uropathy vs chronic 
NSAID use, stable.  Input from Dr. Rodriguez noted.  The patient is s/p PermCath 
placement and is tolerating hemodialysis on a Tuesday/Thursday/Saturday 
schedule.

2.  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  Input from Dr. Baker noted.  
Continue with care as per Dr. Baker.

3.  Elevated troponin, consider secondary to impaired renal clearance, less 
likely NSTEMI.  The patient remains chest pain free.  Input from Dr. Garay 
noted.  Continue with medical management.  He will eventually require stress 
testing.

4.  Anemia of chronic disease.  H/H remained stable.

5.  Obstructive nephrolithiasis s/p ureteral stent placement.  Continue Flomax 
0.4 mg p.o. daily.

6.  Anion gap metabolic acidosis, resolved.

7.  SIRS syndrome, resolved.

8.  Elevated BNP, likely secondary to underlying renal dysfunction.  TTE 
demonstrated no wall motion abnormality or valvular dysfunction.

9.  Prophylaxis.  Continue Protonix for GI prophylaxis and Heparin for DVT 
prophylaxis.





CODE STATUS:  Full code.





__________________________________________

Anatoliy Osman MD





DD:  02/13/2019 8:20:56

DT:  02/13/2019 8:25:33

Job # [id number]



TESHA

## 2019-04-11 NOTE — RAD
Date of service: 



04/11/2019



PROCEDURE:  Retrograde pyelogram



HISTORY:

R/O OBSTRUCTION



COMPARISON:





TECHNIQUE:

56.2 sec of fluoro time.  Cumulative dose 18.07 mGy.  18 images 

submitted



FINDINGS:

Study shows removal of a left ureteral stent.  There are no filling 

defects demonstrated



IMPRESSION:

As above

## 2019-04-12 NOTE — OP
PROCEDURE DATE:  04/11/2019



PREOPERATIVE DIAGNOSES:  Chronic kidney disease and left ureteral calculus.



POSTOPERATIVE DIAGNOSES:  Chronic kidney disease and left ureteral

calculus.



PROCEDURES PERFORMED:  Cystoscopy, removal of left ureteral stent, left

ureteroscopy, laser lithotripsy and basket extraction of left ureteral

calculus, left retrograde pyelogram.



ATTENDING SURGEON:  Raymond López MD



ANESTHESIA:  General.



SPECIMENS:  Fragments of the ureteral stone was sent to pathology.



DRAINS:  There were none.



COMPLICATIONS:  There were none.



DESCRIPTION OF PROCEDURE:  After informed consent was obtained the patient

was taken to operating room and placed on the operating table.  Anesthesia

was administered.  The patient was then placed in the dorsal lithotomy

position and prepped and draped in the usual sterile fashion.  The patient

received IV antibiotics prior to start of the procedure.  A #22-Estonian

cystoscope was placed in the patient's urethra and advanced proximally

under direct vision until the bladder was entered.  A full-survey

inspection of the bladder was then performed, which revealed no tumors or

foreign bodies.  There are diffuse tiny calculi noted floating in the

bladder.  There was a stent noted exiting from the left ureteral orifice. 

The right ureteral orifice appeared within normal limits.  On fluoroscopy,

no specific calcification was noted alongside the stent.  At this point a

grasping forceps was passed through the cystoscope.  The stent was grasped

on its end and withdrawn through the urethral meatus.  The stent uncoiled

without difficulty.  It was then removed from the patient and from the

operative field.  The cystoscope was then re-passed and at this point, a

#5-Estonian Gould catheter was introduced and guided into the left ureteral

orifice.  A left retrograde pyelogram was then performed by instilling

contrast into the left ureter during real time fluoroscopy.  There appeared

to be a filling defect consistent with a calculus in the upper portion of

the lower ureter.  Contrast did bypass the stone and was noted to travel up

to the kidney.  At this point, a sensor wire was obtained.  The sensor wire

was then passed through the open-ended ureteral catheter.  It was passed

beyond the calculus and guided up the ureter under fluoroscopic guidance

until it coiled in the upper collecting system.  At this point, the bladder

was then drained, the cystoscope was removed while leaving the wire in

place.  An #8-Estonian semi-rigid ureteroscope was then obtained.  It was

passed under direct vision into the bladder and guided into the left

ureteral orifice.  When inside the orifice, the scope was then advanced

proximally under direct vision.  In the upper portion of the lower ureter, 

a large yellow calculus was encountered.  At this point using a holmium

laser, the stone was then fragmented using the dusting settings.  The laser

fragmented the stone into multiple small pieces without difficulty.  Some

of the pieces were traveling more proximally and the scope was then able to

be advanced more proximally.  All the larger fragment of stones were

fragmented until no piece appeared greater than 1 mm in size.  At this

point, the ureteroscope was advanced more proximally into the mid and upper

ureter until the level of the renal pelvis was visualized.  There were no

other stones or abnormalities noted.  At this point, the urethroscope was

withdrawn under direct vision.  In the distal ureter, there were multiple

fragments of stone.  At this point a 0-tip nitinol basket was passed.  The

laser was removed and using the basket, a few of the larger fragments were

grasped and withdrawn from the patient.  Few of the fragments were sent to

pathology for analysis.  On the final pass, there were no sizable fragments

noted.  There was some debris and stone dust noted and at this point

ureteroscopy was complete, the ureteroscope was removed, the cystoscope was

re-passed while backloading the guidewire and the Pollack catheter was then

introduced again into the ureter under direct guidance and the guidewire

was removed.  Contrast was then instilled into the system through the

Pollack catheter.  There were no other filling defects noted on delayed

views.  Contrast was noted to be exiting from the kidney down the ureter

with no evidence of obstruction.  At this point the procedure was

completed, the bladder was drained and the cystoscope was removed.  The

patient tolerated the procedure well.  He was returned to the supine

position and taken to the recovery room awake and stable condition.





__________________________________________

Raymond López MD





DD:  04/11/2019 18:43:16

DT:  04/11/2019 18:44:42

Job # [id number]

## 2022-12-15 NOTE — CP.PCM.PN
<Ralph Messer - Last Filed: 02/07/19 12:58>





Subjective





- Date & Time of Evaluation


Date of Evaluation: 02/07/19


Time of Evaluation: 12:58





- Subjective


Subjective: 





GI Progress Note for Dr. Soria





Patient seen and examined at bedside. No acute overnight events. Patient denies 

CP, SOB, n/v/d, melena, hematochezia, fever, chills, HA, or dizziness.








Objective





- Vital Signs/Intake and Output


Vital Signs (last 24 hours): 


                                        











Temp Pulse Resp BP Pulse Ox


 


 98.6 F   72   12   123/60   97 


 


 02/06/19 16:00  02/07/19 06:20  02/07/19 06:20  02/07/19 06:20  02/07/19 06:20











- Medications


Medications: 


                               Current Medications





Albuterol/Ipratropium (Duoneb 3 Mg/0.5 Mg (3 Ml) Ud)  3 ml IH Q2H PRN


   PRN Reason: Shortness of Breath


Aspirin (Aspirin Chewable)  81 mg PO DAILY [facility]


   Last Admin: 02/06/19 12:45 Dose:  Not Given


Atorvastatin Calcium (Lipitor)  40 mg PO DIN [facility]


   Last Admin: 02/06/19 17:47 Dose:  40 mg


Calcium Acetate (Phoslo)  2,001 mg PO WM [facility]


   Last Admin: 02/07/19 09:36 Dose:  Not Given


Darbepoetin Balta (Aranesp)  100 mcg IVP QWK [facility]


   Last Admin: 02/05/19 13:50 Dose:  100 mcg


Ergocalciferol (Drisdol 50,000 Intl Units Cap)  1 cap PO Q7D [facility]


   Last Admin: 02/06/19 17:47 Dose:  1 cap


Cefepime HCl (Maxipime 1gm)  1 gm in 100 mls @ 100 mls/hr IVPB Q24H [facility]; 

Protocol


   Last Admin: 02/06/19 17:48 Dose:  100 mls/hr


Metoprolol Succinate (Toprol Xl)  25 mg PO BRK [facility]


   Last Admin: 02/06/19 13:47 Dose:  25 mg


Nystatin (Mycostatin Cream)  0 ea TOP TID [facility]


   Last Admin: 02/07/19 09:26 Dose:  1 applic


Pantoprazole Sodium (Protonix Inj)  40 mg IVP BID [facility]


   Last Admin: 02/07/19 10:00 Dose:  Not Given


Tamsulosin HCl (Flomax)  0.4 mg PO DAILY [facility]


   Last Admin: 02/06/19 12:45 Dose:  Not Given


Vitamin B Complex/Vit C/Folic Acid (Nephro-Seda)  1 tab PO 0800 [facility]


   Last Admin: 02/06/19 12:46 Dose:  Not Given











- Labs


Labs: 


                                        





                                 02/07/19 12:15 





                                 02/07/19 03:15 





                                        











PT  15.0 SECONDS (9.4-12.5)  H  02/06/19  06:00    


 


INR  1.33   02/06/19  06:00    


 


APTT  33.8 Seconds (26.9-38.3)   02/06/19  06:00    














- Constitutional


Appears: No Acute Distress





- Head Exam


Head Exam: NORMAL INSPECTION





- Eye Exam


Eye Exam: Normal appearance





- ENT Exam


ENT Exam: Mucous Membranes Moist





- Neck Exam


Neck Exam: Normal Inspection





- Respiratory Exam


Respiratory Exam: Clear to Ausculation Bilateral, NORMAL BREATHING PATTERN





- Cardiovascular Exam


Cardiovascular Exam: REGULAR RHYTHM





- GI/Abdominal Exam


GI & Abdominal Exam: Soft.  absent: Distended, Guarding, Tenderness, Rebound





- Back Exam


Back Exam: NORMAL INSPECTION





- Neurological Exam


Neurological Exam: Alert, Awake





- Skin


Skin Exam: Normal Color, Warm





Assessment and Plan





- Assessment and Plan (Free Text)


Assessment: 





Patient is a 69 yo M with PMH of osteoarthritis is admitted for a number of 

issues including acute renal failure, elevated troponins and severe anemia. GI 

was consulted for severe anemia, rule out GI bleed.





1. Severe anemia, r/o GIB


2. ARF, possible ATN


3. Elevated troponins, possible 2/2 decreased clearance


4. H/o NSAID use, r/o PUD


5. Obstructing nephropathy with hydronephrosis


6. Obesity





Plan: 





- H/H stable after blood transfusion, cont to monitor


- Transfuse to maintain Hgb > 8


- Cont PPI


- Stool for occult blood


- Would benefit from EGD when medically optimized


- Possible stent per urology today


- IV abx per ID


- as per renal, urology,cardiology, and ICU team





Discussed w/ Dr. Soria.


Cody Messer, DO PGY2








<Dayron Soria V - Last Filed: 02/07/19 23:45>





Objective





- Vital Signs/Intake and Output


Vital Signs (last 24 hours): 


                                        











Temp Pulse Resp BP Pulse Ox


 


 98.6 F   83   19   142/73   95 


 


 02/07/19 13:48  02/07/19 22:00  02/07/19 22:00  02/07/19 21:00  02/07/19 22:00








Intake and Output: 


                                        











 02/07/19 02/08/19





 18:59 06:59


 


Intake Total  400


 


Output Total  400


 


Balance  0














- Medications


Medications: 


                               Current Medications





Albuterol/Ipratropium (Duoneb 3 Mg/0.5 Mg (3 Ml) Ud)  3 ml IH Q2H PRN


   PRN Reason: Shortness of Breath


Aspirin (Aspirin Chewable)  81 mg PO DAILY [facility]


   Last Admin: 02/07/19 16:23 Dose:  Not Given


Atorvastatin Calcium (Lipitor)  40 mg PO DIN [facility]


   Last Admin: 02/07/19 16:39 Dose:  40 mg


Calcium Acetate (Phoslo)  2,001 mg PO WM [facility]


   Last Admin: 02/07/19 16:39 Dose:  2,001 mg


Darbepoetin Balta (Aranesp)  100 mcg IVP QWK [facility]


   Last Admin: 02/05/19 13:50 Dose:  100 mcg


Ergocalciferol (Drisdol 50,000 Intl Units Cap)  1 cap PO Q7D [facility]


   Last Admin: 02/06/19 17:47 Dose:  1 cap


Furosemide (Lasix)  40 mg IVP ONCE ONE


   Stop: 02/08/19 10:01


Cefepime HCl (Maxipime 1gm)  1 gm in 100 mls @ 100 mls/hr IVPB Q24H [facility]; 

Protocol


   Last Admin: 02/07/19 20:40 Dose:  Not Given


Metoclopramide HCl (Reglan)  10 mg IV ONCE PRN


   PRN Reason: Nausea/Vomiting


Metoprolol Succinate (Toprol Xl)  25 mg PO BRK [facility]


   Last Admin: 02/07/19 16:25 Dose:  Not Given


Nystatin (Mycostatin Cream)  0 ea TOP TID [facility]


   Last Admin: 02/07/19 16:23 Dose:  Not Given


Pantoprazole Sodium (Protonix Inj)  40 mg IVP BID [facility]


   Last Admin: 02/07/19 20:41 Dose:  Not Given


Tamsulosin HCl (Flomax)  0.4 mg PO DAILY [facility]


   Last Admin: 02/07/19 16:39 Dose:  0.4 mg


Vitamin B Complex/Vit C/Folic Acid (Nephro-Seda)  1 tab PO 0800 [facility]


   Last Admin: 02/07/19 16:23 Dose:  Not Given











- Labs


Labs: 


                                        





                                 02/07/19 12:15 





                                 02/07/19 03:15 





                                        











PT  15.0 SECONDS (9.4-12.5)  H  02/06/19  06:00    


 


INR  1.33   02/06/19  06:00    


 


APTT  33.8 Seconds (26.9-38.3)   02/06/19  06:00    














Attending/Attestation





- Attestation


I have personally seen and examined this patient.: Yes


I have fully participated in the care of the patient.: Yes


I have reviewed all pertinent clinical information, including history, physical 

exam and plan: Yes


Notes (Text): 


p


02/07/19 23:45
Subjective





- Date & Time of Evaluation


Date of Evaluation: 02/06/19


Time of Evaluation: 11:45





- Subjective


Subjective: 





Comfortable in bed, undergoing in dialysis, not in distress, no fevers.





Objective





- Vital Signs/Intake and Output


Vital Signs (last 24 hours): 


                                        











Temp Pulse Resp BP Pulse Ox


 


 96.6 F L  108 H  23   129/61   98 


 


 02/04/19 23:16  02/05/19 06:00  02/04/19 22:12  02/04/19 17:58  02/04/19 17:58








Intake and Output: 


                                        











 02/05/19 02/05/19





 06:59 18:59


 


Intake Total 5370 


 


Output Total 700 


 


Balance 4670 














- Medications


Medications: 


                               Current Medications





Albuterol/Ipratropium (Duoneb 3 Mg/0.5 Mg (3 Ml) Ud)  3 ml IH Q2H PRN


   PRN Reason: Shortness of Breath


Aspirin (Aspirin Chewable)  81 mg PO DAILY [facility]


   Last Admin: 02/05/19 09:27 Dose:  81 mg


Atorvastatin Calcium (Lipitor)  40 mg PO DIN [facility]


Calcium Acetate (Phoslo)  2,001 mg PO WM [facility]


   Last Admin: 02/05/19 12:07 Dose:  Not Given


Darbepoetin Balta (Aranesp)  100 mcg IVP QWK [facility]


   Last Admin: 02/05/19 13:50 Dose:  100 mcg


Heparin Sodium (Porcine) (Heparin)  5,000 units SC Q8 [facility]; Protocol


   Last Admin: 02/05/19 13:44 Dose:  5,000 units


Cefepime HCl (Maxipime 1gm)  1 gm in 100 mls @ 100 mls/hr IVPB Q24H [facility]; 

Protocol


   Last Admin: 02/04/19 20:27 Dose:  100 mls/hr


Sodium Bicarbonate 150 meq/ (Dextrose)  1,150 mls @ 50 mls/hr IV .Q23H [facility]


   Stop: 02/07/19 12:31


   Last Admin: 02/05/19 13:40 Dose:  50 mls/hr


Nystatin (Mycostatin Cream)  0 ea TOP TID [facility]


   Last Admin: 02/05/19 13:43 Dose:  1 applic


Pantoprazole Sodium (Protonix Inj)  40 mg IVP DAILY [facility]


   Last Admin: 02/05/19 09:02 Dose:  40 mg


Tamsulosin HCl (Flomax)  0.4 mg PO DAILY [facility]


Vitamin B Complex/Vit C/Folic Acid (Nephro-Seda)  1 tab PO 0800 [facility]











- Labs


Labs: 


                                        





                                 02/05/19 05:40 





                                 02/05/19 05:40 





                                        











PT  14.3 SECONDS (9.4-12.5)  H  02/04/19  14:50    


 


INR  1.29   02/04/19  14:50    


 


APTT  38.1 Seconds (26.9-38.3)   02/04/19  14:50    














- Constitutional


Appears: Chronically Ill





- Head Exam


Head Exam: NORMAL INSPECTION





- Neck Exam


Neck Exam: absent: Meningismus





- Respiratory Exam


Respiratory Exam: Decreased Breath Sounds





- Cardiovascular Exam


Cardiovascular Exam: +S1, +S2





- GI/Abdominal Exam


GI & Abdominal Exam: Soft.  absent: Tenderness





Assessment and Plan





- Assessment and Plan (Free Text)


Plan: 





Assessment


Systemic inflammatory response syndrome, probably due to acute renal failure R/O

acute coronary syndrome in this patient presenting with dehydration and 

metabolic acidosis, R/O sepsis


arthritis


chronic right leg rash


S/P hip surgery





Plan


gave a dose of IV Vancomycin and continue Cefepime day 2 pending final blood, 

urine cx results, CXR, PCT


follow up further plans of Renal, Cardiology, ICU team 


will continue to monitor clinically
Subjective





- Date & Time of Evaluation


Date of Evaluation: 02/06/19


Time of Evaluation: 15:34





- Subjective


Subjective: 





Nephrology Consultation Note:





Assessment: critical


Acute Kidney Injury (N17.9) with uremia ? etiology (NSAIDs use, obs uropathy 

mild left hydro with calculus) started dialysis 2/5/19


Anemia (D64.9), Hyperphosphatemia (E83.39) hyperkalemia, vit D def, sec 

hyperparathyroidism


HAGMA with respi compensation


obesity


ex smoker


CKD 3 with baseline cr 2.2 in 2016


elevated troponins





Plan


Hd 2nd session today, next HD tomorrow 


Maintain hemodynamics stable. Avoid hypotension. Patient not on ACEI/ARB due to 

recent ROXANE


Monitor Input/Output, daily weights and renal function with basic metabolic 

panel


started phos binder, nephrovite and weekly aransep. PRBC as needed


added flomax. urology eval. pt planned for left ureteral stent


may consider kidney biopsy depending upon work up results


d/c bicarb drip


once stable, renal scan to assess split renal function. check uric acid


added weekly Vit D





Check urine analysis, spot protein/creatinine, albumin/creatinine ratio, urine 

for eosinophils. renal and bladder sonogram


Check GN work up as C3, C4, GLENN, Anti dsDNA, ASO titers, ANCA (MPO and NV-3), 

Anti GBM antibody, HIV/Hep B and Hep C serology 


Anemia work up with TSAT/Ferritin/Vitamin B12/folate, serum protein 

electrophoresis with immunofixation, serum free light chain assay (Kappa/Lambda)


Check for 25-OH vitamin D, iPTH, phosphorus level. 





Dose meds/antibiotics for reduced GFR. Avoid fleets enema/magnesium based 

laxatives. Avoid nephrotoxins/NSAIDs/ iodinated contrast (unless needed 

emergently)


Glycemic control


Further work up/management as per primary team





Thanks for allowing me to participate in care of your patient. Will follow 

patient with you. Please call if any Qs. had d/w team


Dr You Cardoso


Office: [phone] Cell: [phone] Fax: [phone]





Chief Complaint; SOB on exertion


Reason for consult: Acute Kidney Injury


HPI: Pt is a 68 M   with hx of HTN obesity hip replacement, hasn't f/up with PMD

for long time presented with complaints of worsening SOB on exertion and feeling

sick. found to have severe ROXANE. pt not aware about kidney disease in past. 

denies change in urine output recently. 


Denies OTC/herbal meds but take NSAIDs as alleve 2 tab daily 


No recent iodinated contrast exposure. No obvious episodes of low BP.


ex smoker. no etoh or drugs


decreased oral intake for last few days





ROS: feels better. c/o left hand swelling, says gout in past


Cardiovascular: No chest pain. 


Pulmonary: denie shortness of breath 


Gastrointestinal: denies abdominal pain had nausea. No vomiting. 


Genitourinary: No pain while urinating. Denies blood in urine. 


All other negative except as mentioned in HPI. has reduced appetite





Physical Examination:      


General Appearance: better appearing co-operative . 


Vitals reviewed and noted as below


Head; Atraumatic, normocephalic


ENT: no ulcers no thrush. Tongue is midline/dry. Oropharynx: no rash or ulcers.


EYES: Pupils are equal, round and reactive to light accommodation. Eye muscles 

and extraocular movement intact. Sclera is anicteric.


Neck; supple no lymphadenopathy, no thyromegaly or bruit


Lungs: normal respiratory rate/effort. Breath sounds bilateral equal and clear


Heart: Increased rate. s1s2 normal. No rub or gallop. 


Extremities: no edema. No varicose veins. left hand swelling +


Neurological: Patient is alert, awake and oriented to person, place and time. No

focal deficit. Strength bilateral appropriate and equal


Skin: Warm and dry. Normal turgor. Palpitation: Normal elasticity for age. 

axillary fungal rash +. Rt lower extremity scaly/scab ulceration


Abdomen: Abdomen is soft. Bowel sounds +. There is no abdominal tenderness, no 

guarding/rigidity no organomegaly


Psych: normal insight and normal affect/mood


MSK: no joint tenderness or swelling. Digits and nails normal, no deformity


: kidney or bladder not palpable





Labs/imaging reviewed.


Past medical history, past surgical history, family history, social history, 

allergy reviewed and noted as below


Family hx: hx of CKD/ESRD and lupus in mother. Rest non-contributory 








Objective





- Vital Signs/Intake and Output


Vital Signs (last 24 hours): 


                                        











Temp Pulse Resp BP Pulse Ox


 


 98.1 F   85   12   135/67   87 L


 


 02/06/19 06:00  02/06/19 13:47  02/05/19 18:00  02/06/19 13:47  02/06/19 12:40








Intake and Output: 


                                        











 02/06/19 02/06/19





 06:59 18:59


 


Intake Total 840 


 


Output Total 1000 


 


Balance -160 














- Medications


Medications: 


                               Current Medications





Albuterol/Ipratropium (Duoneb 3 Mg/0.5 Mg (3 Ml) Ud)  3 ml IH Q2H PRN


   PRN Reason: Shortness of Breath


Aspirin (Aspirin Chewable)  81 mg PO DAILY [facility]


   Last Admin: 02/06/19 12:45 Dose:  Not Given


Atorvastatin Calcium (Lipitor)  40 mg PO DIN [facility]


   Last Admin: 02/05/19 17:22 Dose:  40 mg


Calcium Acetate (Phoslo)  2,001 mg PO WM [facility]


   Last Admin: 02/06/19 12:46 Dose:  Not Given


Darbepoetin Balta (Aranesp)  100 mcg IVP QWK [facility]


   Last Admin: 02/05/19 13:50 Dose:  100 mcg


Ergocalciferol (Drisdol 50,000 Intl Units Cap)  1 cap PO Q7D [facility]


Cefepime HCl (Maxipime 1gm)  1 gm in 100 mls @ 100 mls/hr IVPB Q24H [facility]; 

Protocol


   Last Admin: 02/05/19 17:45 Dose:  100 mls/hr


Metoprolol Succinate (Toprol Xl)  25 mg PO BRK [facility]


   Last Admin: 02/06/19 13:47 Dose:  25 mg


Nystatin (Mycostatin Cream)  0 ea TOP TID [facility]


   Last Admin: 02/06/19 13:46 Dose:  1 applic


Pantoprazole Sodium (Protonix Inj)  40 mg IVP DAILY [facility]


   Last Admin: 02/06/19 09:32 Dose:  40 mg


Tamsulosin HCl (Flomax)  0.4 mg PO DAILY [facility]


   Last Admin: 02/06/19 12:45 Dose:  Not Given


Vitamin B Complex/Vit C/Folic Acid (Nephro-Seda)  1 tab PO 0800 [facility]


   Last Admin: 02/06/19 12:46 Dose:  Not Given











- Labs


Labs: 


                                        





                                 02/06/19 06:00 





                                 02/06/19 06:00 





                                        











PT  15.0 SECONDS (9.4-12.5)  H  02/06/19  06:00    


 


INR  1.33   02/06/19  06:00    


 


APTT  33.8 Seconds (26.9-38.3)   02/06/19  06:00
Subjective





- Date & Time of Evaluation


Date of Evaluation: 02/07/19


Time of Evaluation: 09:25





- Subjective


Subjective: 





Comfortable in bed, no fevers.





Objective





- Vital Signs/Intake and Output


Vital Signs (last 24 hours): 


                                        











Temp Pulse Resp BP Pulse Ox


 


 98.1 F   78   12   121/66   99 


 


 02/06/19 06:00  02/06/19 08:55  02/05/19 18:00  02/06/19 08:56  02/06/19 08:55








Intake and Output: 


                                        











 02/06/19 02/06/19





 06:59 18:59


 


Intake Total 840 


 


Output Total 1000 


 


Balance -160 














- Medications


Medications: 


                               Current Medications





Albuterol/Ipratropium (Duoneb 3 Mg/0.5 Mg (3 Ml) Ud)  3 ml IH Q2H PRN


   PRN Reason: Shortness of Breath


Aspirin (Aspirin Chewable)  81 mg PO DAILY [facility]


   Last Admin: 02/05/19 09:27 Dose:  81 mg


Atorvastatin Calcium (Lipitor)  40 mg PO DIN [facility]


   Last Admin: 02/05/19 17:22 Dose:  40 mg


Calcium Acetate (Phoslo)  2,001 mg PO WM [facility]


   Last Admin: 02/05/19 17:22 Dose:  2,001 mg


Darbepoetin Balta (Aranesp)  100 mcg IVP QWK [facility]


   Last Admin: 02/05/19 13:50 Dose:  100 mcg


Cefepime HCl (Maxipime 1gm)  1 gm in 100 mls @ 100 mls/hr IVPB Q24H [facility]; 

Protocol


   Last Admin: 02/05/19 17:45 Dose:  100 mls/hr


Metoprolol Succinate (Toprol Xl)  25 mg PO BRK [facility]


Nystatin (Mycostatin Cream)  0 ea TOP TID [facility]


   Last Admin: 02/05/19 18:51 Dose:  1 applic


Pantoprazole Sodium (Protonix Inj)  40 mg IVP DAILY [facility]


   Last Admin: 02/06/19 09:32 Dose:  40 mg


Tamsulosin HCl (Flomax)  0.4 mg PO DAILY [facility]


   Last Admin: 02/05/19 17:23 Dose:  0.4 mg


Vitamin B Complex/Vit C/Folic Acid (Nephro-Seda)  1 tab PO 0800 [facility]











- Labs


Labs: 


                                        





                                 02/06/19 06:00 





                                 02/06/19 06:00 





                                        











PT  15.0 SECONDS (9.4-12.5)  H  02/06/19  06:00    


 


INR  1.33   02/06/19  06:00    


 


APTT  33.8 Seconds (26.9-38.3)   02/06/19  06:00    














- Constitutional


Appears: Non-toxic, Chronically Ill





- Head Exam


Head Exam: NORMAL INSPECTION





- ENT Exam


ENT Exam: Mucous Membranes Moist





- Neck Exam


Neck Exam: absent: Meningismus





- Respiratory Exam


Respiratory Exam: Decreased Breath Sounds





- Cardiovascular Exam


Cardiovascular Exam: +S1, +S2





- GI/Abdominal Exam


GI & Abdominal Exam: Soft.  absent: Tenderness





Assessment and Plan





- Assessment and Plan (Free Text)


Plan: 


Assessment


Systemic inflammatory response syndrome, probably due to acute renal failure 

from left sided obstructive uropathy R/O acute coronary syndrome in this patient

presenting with dehydration and metabolic acidosis, R/O sepsis but so far no 

evidence identified


arthritis


chronic right leg rash


S/P hip surgery





Plan


gave a dose of IV Vancomycin and continue Cefepime day 3 pending final blood, 

urine cx results


plan is for stent placement on the left


will continue to monitor clinically
Subjective





- Date & Time of Evaluation


Date of Evaluation: 02/07/19


Time of Evaluation: 16:07





- Subjective


Subjective: 





Nephrology Consultation Note:





Assessment: critical


Acute Kidney Injury (N17.9) with uremia ? etiology (NSAIDs use, obs uropathy 

mild left hydro with calculus) started dialysis 2/5/19


Anemia (D64.9), Hyperphosphatemia (E83.39) hyperkalemia, vit D def, sec 

hyperparathyroidism


HAGMA with respi compensation


obesity


ex smoker


CKD 3 with baseline cr 2.2 in 2016


elevated troponins


s/p left ureteral stent by Dr López 2/7/19





Plan


Hd 2nd session yesterday done, tolerated well, next HD today as 3rd session. pt 

to be reassessed tomorrow for dialysis


Maintain hemodynamics stable. Avoid hypotension. Patient not on ACEI/ARB due to 

recent ROXANE


Monitor Input/Output, daily weights and renal function with basic metabolic 

panel


started phos binder, nephrovite and weekly aransep. PRBC as needed


added flomax. urology eval. pt planned for left ureteral stent


may consider kidney biopsy depending upon work up results


ordered for lasix renal scan to assess split renal function. 


added weekly Vit D


SW consult for outpt HD placement





Check urine analysis, spot protein/creatinine, albumin/creatinine ratio, urine 

for eosinophils. renal and bladder sonogram


Check GN work up as C3, C4, GLENN, Anti dsDNA, ASO titers, ANCA (MPO and IN-3), 

Anti GBM antibody, HIV/Hep B and Hep C serology 


Anemia work up with TSAT/Ferritin/Vitamin B12/folate, serum protein 

electrophoresis with immunofixation, serum free light chain assay (Kappa/Lambda)


Check for 25-OH vitamin D, iPTH, phosphorus level. 





Dose meds/antibiotics for reduced GFR. Avoid fleets enema/magnesium based 

laxatives. Avoid nephrotoxins/NSAIDs/ iodinated contrast (unless needed 

emergently)


Glycemic control


Further work up/management as per primary team





Thanks for allowing me to participate in care of your patient. Will follow 

patient with you. Please call if any Qs. had d/w team


Dr You Cardoso


Office: [phone] Cell: [phone] Fax: [phone]





Chief Complaint; SOB on exertion


Reason for consult: Acute Kidney Injury


HPI: Pt is a 68 M   with hx of HTN obesity hip replacement, hasn't f/up with PMD

for long time presented with complaints of worsening SOB on exertion and feeling

sick. found to have severe ROXANE. pt not aware about kidney disease in past. 

denies change in urine output recently. 


Denies OTC/herbal meds but take NSAIDs as alleve 2 tab daily 


No recent iodinated contrast exposure. No obvious episodes of low BP.


ex smoker. no etoh or drugs


decreased oral intake for last few days





ROS: feels better. c/o left hand swelling, says gout in past


Cardiovascular: No chest pain. 


Pulmonary: denie shortness of breath 


Gastrointestinal: denies abdominal pain had nausea. No vomiting. 


Genitourinary: No pain while urinating. Denies blood in urine. 


All other negative except as mentioned in HPI. has reduced appetite





Physical Examination:      


General Appearance: better appearing co-operative . 


Vitals reviewed and noted as below


Head; Atraumatic, normocephalic


ENT: no ulcers no thrush. Tongue is midline/dry. Oropharynx: no rash or ulcers.


EYES: Pupils are equal, round and reactive to light accommodation. Eye muscles 

and extraocular movement intact. Sclera is anicteric.


Neck; supple no lymphadenopathy, no thyromegaly or bruit


Lungs: normal respiratory rate/effort. Breath sounds bilateral equal and clear


Heart: Increased rate. s1s2 normal. No rub or gallop. 


Extremities: no edema. No varicose veins. left hand swelling +


Neurological: Patient is alert, awake and oriented to person, place and time. No

focal deficit. Strength bilateral appropriate and equal


Skin: Warm and dry. Normal turgor. Palpitation: Normal elasticity for age. 

axillary fungal rash +. Rt lower extremity scaly/scab ulceration


Abdomen: Abdomen is soft. Bowel sounds +. There is no abdominal tenderness, no 

guarding/rigidity no organomegaly


Psych: normal insight and normal affect/mood


MSK: no joint tenderness or swelling. Digits and nails normal, no deformity


: kidney or bladder not palpable





Labs/imaging reviewed.


Past medical history, past surgical history, family history, social history, 

allergy reviewed and noted as below


Family hx: hx of CKD/ESRD and lupus in mother. Rest non-contributory 











Objective





- Vital Signs/Intake and Output


Vital Signs (last 24 hours): 


                                        











Temp Pulse Resp BP Pulse Ox


 


 98.6 F   72   12   116/63   99 


 


 02/07/19 13:48  02/07/19 13:48  02/07/19 13:48  02/07/19 13:48  02/07/19 13:48











- Medications


Medications: 


                               Current Medications





Albuterol/Ipratropium (Duoneb 3 Mg/0.5 Mg (3 Ml) Ud)  3 ml IH Q2H PRN


   PRN Reason: Shortness of Breath


Aspirin (Aspirin Chewable)  81 mg PO DAILY Replaced by [facility]


   Last Admin: 02/06/19 12:45 Dose:  Not Given


Atorvastatin Calcium (Lipitor)  40 mg PO DIN Replaced by [facility]


   Last Admin: 02/06/19 17:47 Dose:  40 mg


Calcium Acetate (Phoslo)  2,001 mg PO WM Replaced by [facility]


   Last Admin: 02/07/19 09:36 Dose:  Not Given


Darbepoetin Balta (Aranesp)  100 mcg IVP QWK Replaced by [facility]


   Last Admin: 02/05/19 13:50 Dose:  100 mcg


Ergocalciferol (Drisdol 50,000 Intl Units Cap)  1 cap PO Q7D Replaced by [facility]


   Last Admin: 02/06/19 17:47 Dose:  1 cap


Furosemide (Lasix)  40 mg IVP ONCE ONE


   Stop: 02/08/19 10:01


Cefepime HCl (Maxipime 1gm)  1 gm in 100 mls @ 100 mls/hr IVPB Q24H Replaced by [facility]; 

Protocol


   Last Admin: 02/06/19 17:48 Dose:  100 mls/hr


Metoclopramide HCl (Reglan)  10 mg IV ONCE PRN


   PRN Reason: Nausea/Vomiting


Metoprolol Succinate (Toprol Xl)  25 mg PO BRK Replaced by [facility]


   Last Admin: 02/06/19 13:47 Dose:  25 mg


Nystatin (Mycostatin Cream)  0 ea TOP TID Replaced by [facility]


   Last Admin: 02/07/19 09:26 Dose:  1 applic


Pantoprazole Sodium (Protonix Inj)  40 mg IVP BID Replaced by [facility]


   Last Admin: 02/07/19 10:00 Dose:  Not Given


Tamsulosin HCl (Flomax)  0.4 mg PO DAILY Replaced by [facility]


   Last Admin: 02/06/19 12:45 Dose:  Not Given


Vitamin B Complex/Vit C/Folic Acid (Nephro-Seda)  1 tab PO 0800 Replaced by [facility]


   Last Admin: 02/06/19 12:46 Dose:  Not Given











- Labs


Labs: 


                                        





                                 02/07/19 12:15 





                                 02/07/19 03:15 





                                        











PT  15.0 SECONDS (9.4-12.5)  H  02/06/19  06:00    


 


INR  1.33   02/06/19  06:00    


 


APTT  33.8 Seconds (26.9-38.3)   02/06/19  06:00
Subjective





- Date & Time of Evaluation


Date of Evaluation: 02/08/19


Time of Evaluation: 08:00





- Subjective


Subjective: 





Afebrile, not in distress, had left ureteral stent placed yesterday and 

tolerated procedure.





Objective





- Vital Signs/Intake and Output


Vital Signs (last 24 hours): 


                                        











Temp Pulse Resp BP Pulse Ox


 


 98.6 F   72   12   123/60   97 


 


 02/06/19 16:00  02/07/19 06:20  02/07/19 06:20  02/07/19 06:20  02/07/19 06:20











- Medications


Medications: 


                               Current Medications





Albuterol/Ipratropium (Duoneb 3 Mg/0.5 Mg (3 Ml) Ud)  3 ml IH Q2H PRN


   PRN Reason: Shortness of Breath


Aspirin (Aspirin Chewable)  81 mg PO DAILY [facility]


   Last Admin: 02/06/19 12:45 Dose:  Not Given


Atorvastatin Calcium (Lipitor)  40 mg PO DIN [facility]


   Last Admin: 02/06/19 17:47 Dose:  40 mg


Calcium Acetate (Phoslo)  2,001 mg PO WM [facility]


   Last Admin: 02/07/19 09:36 Dose:  Not Given


Darbepoetin Balta (Aranesp)  100 mcg IVP QWK [facility]


   Last Admin: 02/05/19 13:50 Dose:  100 mcg


Ergocalciferol (Drisdol 50,000 Intl Units Cap)  1 cap PO Q7D [facility]


   Last Admin: 02/06/19 17:47 Dose:  1 cap


Cefepime HCl (Maxipime 1gm)  1 gm in 100 mls @ 100 mls/hr IVPB Q24H [facility]; 

Protocol


   Last Admin: 02/06/19 17:48 Dose:  100 mls/hr


Metoprolol Succinate (Toprol Xl)  25 mg PO BRK [facility]


   Last Admin: 02/06/19 13:47 Dose:  25 mg


Nystatin (Mycostatin Cream)  0 ea TOP TID [facility]


   Last Admin: 02/07/19 09:26 Dose:  1 applic


Pantoprazole Sodium (Protonix Inj)  40 mg IVP BID [facility]


Tamsulosin HCl (Flomax)  0.4 mg PO DAILY [facility]


   Last Admin: 02/06/19 12:45 Dose:  Not Given


Vitamin B Complex/Vit C/Folic Acid (Nephro-Seda)  1 tab PO 0800 [facility]


   Last Admin: 02/06/19 12:46 Dose:  Not Given











- Labs


Labs: 


                                        





                                 02/07/19 03:15 





                                 02/07/19 03:15 





                                        











PT  15.0 SECONDS (9.4-12.5)  H  02/06/19  06:00    


 


INR  1.33   02/06/19  06:00    


 


APTT  33.8 Seconds (26.9-38.3)   02/06/19  06:00    














- Constitutional


Appears: Chronically Ill





- Head Exam


Head Exam: NORMAL INSPECTION





- Respiratory Exam


Respiratory Exam: Decreased Breath Sounds





- Cardiovascular Exam


Cardiovascular Exam: +S1, +S2





- GI/Abdominal Exam


GI & Abdominal Exam: Soft.  absent: Tenderness





- Extremities Exam


Additional comments: 





right leg with dressings in place





Assessment and Plan





- Assessment and Plan (Free Text)


Plan: 





Assessment


Systemic inflammatory response syndrome, probably due to acute renal failure 

from left sided obstructive uropathy S/P left ureteral stent placement R/O acute

coronary syndrome in this patient presenting with dehydration and metabolic 

acidosis, R/O sepsis but so far no evidence identified


arthritis


chronic right leg rash


S/P hip surgery





Plan


on Cefepime day 4 - cultures have been negative - will d/c antibiotics and 

observe


will continue to monitor clinically
Subjective





- Date & Time of Evaluation


Date of Evaluation: 02/08/19


Time of Evaluation: 18:05





- Subjective


Subjective: 





Nephrology Consultation Note:





Assessment: critical


Acute Kidney Injury (N17.9) with uremia ? etiology (NSAIDs use, obs uropathy 

mild left hydro with calculus) started dialysis 2/5/19


Anemia (D64.9), Hyperphosphatemia (E83.39) hyperkalemia, vit D def, sec 

hyperparathyroidism


HAGMA with respi compensation


obesity


ex smoker


CKD 3 with baseline cr 2.2 in 2016


elevated troponins


s/p left ureteral stent by Dr López 2/7/19





Plan


patient is s/p 3 sessions of hd, will hold today 


hd tomorrow then tts schedule 


can consult surgery for permacath evaluation if no improvement in renal function


Maintain hemodynamics stable. Avoid hypotension. Patient not on ACEI/ARB due to 

recent ROXANE


Monitor Input/Output


on phos binder, nephrovite and weekly aransep. PRBC as needed vitamin d


added flomax. urology eval. pt planned for left ureteral stent


may consider kidney biopsy depending upon work up results


ordered for lasix renal scan to assess split renal function pending


SW consult for outpt HD placement





please call us if any qs, [phone]


d/w primary attending on service





ROS:negative 





Physical Examination:      


General Appearance: better appearing co-operative . 


Vitals reviewed and noted as below


Head; Atraumatic, normocephalic


ENT: no ulcers


EYES: Eye muscles and extraocular movement intact. Sclera is anicteric.


Neck; supple no jvd


Lungs: normal respiratory rate/effort. Breath sounds bilateral equal and clear


Heart: Increased rate. s1s2 normal. No rub or gallop. 


Extremities: no edema. No varicose veins. left hand swelling +


Neurological: Patient is alert, awake and oriented to person, place and time. No

focal deficit. Strength bilateral appropriate and equal


Skin: Warm and dry. Normal turgor. 


Abdomen: Abdomen is soft. Bowel sounds +. There is no abdominal tenderness, no 

guarding/rigidity no organomegaly


Psych: normal insight and normal affect/mood


MSK: no joint tenderness or swelling.





Objective





- Vital Signs/Intake and Output


Vital Signs (last 24 hours): 


                                        











Temp Pulse Resp BP Pulse Ox


 


 98.6 F   88   13   110/58 L  96 


 


 02/07/19 13:48  02/08/19 16:00  02/08/19 16:00  02/08/19 16:00  02/08/19 16:00








Intake and Output: 


                                        











 02/08/19 02/08/19





 06:59 18:59


 


Intake Total 400 


 


Output Total 1000 


 


Balance -600 














- Medications


Medications: 


                               Current Medications





Albuterol/Ipratropium (Duoneb 3 Mg/0.5 Mg (3 Ml) Ud)  3 ml IH Q2H PRN


   PRN Reason: Shortness of Breath


Aspirin (Aspirin Chewable)  81 mg PO DAILY [facility]


   Last Admin: 02/08/19 09:45 Dose:  81 mg


Atorvastatin Calcium (Lipitor)  40 mg PO DIN [facility]


   Last Admin: 02/07/19 16:39 Dose:  40 mg


Calcium Acetate (Phoslo)  2,001 mg PO WM [facility]


   Last Admin: 02/08/19 15:03 Dose:  2,001 mg


Darbepoetin Balta (Aranesp)  100 mcg IVP QWK [facility]


   Last Admin: 02/05/19 13:50 Dose:  100 mcg


Ergocalciferol (Drisdol 50,000 Intl Units Cap)  1 cap PO Q7D [facility]


   Last Admin: 02/06/19 17:47 Dose:  1 cap


Cefepime HCl (Maxipime 1gm)  1 gm in 100 mls @ 100 mls/hr IVPB Q24H [facility]; 

Protocol


   Last Admin: 02/08/19 14:46 Dose:  100 mls/hr


Indomethacin (Indocin)  50 mg PO TID PRN


   PRN Reason: pain


Metoclopramide HCl (Reglan)  10 mg IV ONCE PRN


   PRN Reason: Nausea/Vomiting


Metoprolol Succinate (Toprol Xl)  25 mg PO BRK [facility]


   Last Admin: 02/08/19 07:44 Dose:  25 mg


Nystatin (Mycostatin Cream)  0 ea TOP TID [facility]


   Last Admin: 02/08/19 15:23 Dose:  1 applic


Pantoprazole Sodium (Protonix Inj)  40 mg IVP BID [facility]


   Last Admin: 02/08/19 09:46 Dose:  40 mg


Tamsulosin HCl (Flomax)  0.4 mg PO DAILY [facility]


   Last Admin: 02/08/19 09:45 Dose:  0.4 mg


Vitamin B Complex/Vit C/Folic Acid (Nephro-Seda)  1 tab PO 0800 [facility]


   Last Admin: 02/08/19 09:47 Dose:  1 tab











- Labs


Labs: 


                                        





                                 02/08/19 05:50 





                                 02/08/19 05:50 





                                        











PT  15.0 SECONDS (9.4-12.5)  H  02/06/19  06:00    


 


INR  1.33   02/06/19  06:00    


 


APTT  33.8 Seconds (26.9-38.3)   02/06/19  06:00
Subjective





- Date & Time of Evaluation


Date of Evaluation: 02/08/19


Time of Evaluation: 22:08





- Subjective


Subjective: 





S:Patient was seen .


   I was asked to co sign order of benadryl.


   As per nurse benadryl was not accepted by patient.





O:


   Awake ,alert.


   Not in distress.


   Lungs:Normal breathing pattern.





A:Insomnia.





P: Benadryl was  ordered but not  taken by patient.














Objective





- Vital Signs/Intake and Output


Vital Signs (last 24 hours): 


                                        











Temp Pulse Resp BP Pulse Ox


 


 98.6 F   85   18   113/61   76 L


 


 02/07/19 13:48  02/08/19 19:30  02/08/19 19:30  02/08/19 19:30  02/08/19 19:30








Intake and Output: 


                                        











 02/08/19 02/09/19





 18:59 06:59


 


Intake Total 500 


 


Output Total 1000 


 


Balance -500 














- Medications


Medications: 


                               Current Medications





Albuterol/Ipratropium (Duoneb 3 Mg/0.5 Mg (3 Ml) Ud)  3 ml IH Q2H PRN


   PRN Reason: Shortness of Breath


Aspirin (Aspirin Chewable)  81 mg PO DAILY [facility]


   Last Admin: 02/08/19 09:45 Dose:  81 mg


Atorvastatin Calcium (Lipitor)  40 mg PO DIN [facility]


   Last Admin: 02/08/19 19:23 Dose:  40 mg


Calcium Acetate (Phoslo)  2,001 mg PO WM [facility]


   Last Admin: 02/08/19 19:43 Dose:  2,001 mg


Darbepoetin Balta (Aranesp)  100 mcg IVP QWK [facility]


   Last Admin: 02/05/19 13:50 Dose:  100 mcg


Ergocalciferol (Drisdol 50,000 Intl Units Cap)  1 cap PO Q7D [facility]


   Last Admin: 02/06/19 17:47 Dose:  1 cap


Cefepime HCl (Maxipime 1gm)  1 gm in 100 mls @ 100 mls/hr IVPB Q24H [facility]; 

Protocol


   Last Admin: 02/08/19 14:46 Dose:  100 mls/hr


Indomethacin (Indocin)  50 mg PO TID PRN


   PRN Reason: pain


   Last Admin: 02/08/19 19:24 Dose:  50 mg


Metoclopramide HCl (Reglan)  10 mg IV ONCE PRN


   PRN Reason: Nausea/Vomiting


Metoprolol Succinate (Toprol Xl)  25 mg PO BRK [facility]


   Last Admin: 02/08/19 07:44 Dose:  25 mg


Nystatin (Mycostatin Cream)  0 ea TOP TID [facility]


   Last Admin: 02/08/19 19:44 Dose:  1 applic


Pantoprazole Sodium (Protonix Inj)  40 mg IVP BID [facility]


   Last Admin: 02/08/19 19:43 Dose:  40 mg


Tamsulosin HCl (Flomax)  0.4 mg PO DAILY [facility]


   Last Admin: 02/08/19 09:45 Dose:  0.4 mg


Vitamin B Complex/Vit C/Folic Acid (Nephro-Seda)  1 tab PO 0800 [facility]


   Last Admin: 02/08/19 09:47 Dose:  1 tab











- Labs


Labs: 


                                        





                                 02/08/19 05:50 





                                 02/08/19 05:50 





                                        











PT  15.0 SECONDS (9.4-12.5)  H  02/06/19  06:00    


 


INR  1.33   02/06/19  06:00    


 


APTT  33.8 Seconds (26.9-38.3)   02/06/19  06:00
Subjective





- Date & Time of Evaluation


Date of Evaluation: 02/09/19


Time of Evaluation: 11:19





- Subjective


Subjective: 





Nephrology Consultation Note:





Assessment: critical


Acute Kidney Injury (N17.9) with uremia ? etiology (NSAIDs use, obs uropathy 

mild left hydro with calculus) started dialysis 2/5/19


Anemia (D64.9), Hyperphosphatemia (E83.39) hyperkalemia, vit D def, sec 

hyperparathyroidism


HAGMA with respi compensation


obesity


ex smoker


CKD 3 with baseline cr 2.2 in 2016


elevated troponins


s/p left ureteral stent by Dr López 2/7/19


? paraprotein





Plan


seen on Hd tolerating tx - changed to 4 k bath for last hour of tx


continue tts


consult IR for permacath - order placed discussed w/ rn


on phos binder, nephrovite and weekly aransep. PRBC as needed vitamin d


f/u gu


+ SIFE w/ + Lambda Light chain  - serum Free ligth chain assay is pending [facility] 

hematology eval


please call us if any qs, [phone]











s: seen and examiend on HD tolerating tx











Physical Examination:      


General Appearance: better appearing co-operative . 


Vitals reviewed and noted as below


Head; Atraumatic, normocephalic


ENT: no ulcers


EYES: Eye muscles and extraocular movement intact. Sclera is anicteric.


Neck; supple no jvd


Lungs: normal respiratory rate/effort. Breath sounds bilateral equal and clear


Heart: Increased rate. s1s2 normal. No rub or gallop. 


Extremities: no edema. No varicose veins. left hand swelling +


Neurological: Patient is alert, awake and oriented to person, place and time. No

focal deficit. Strength bilateral appropriate and equal


Skin: Warm and dry. Normal turgor. 


Abdomen: Abdomen is soft. Bowel sounds +. There is no abdominal tenderness, no 

guarding/rigidity no organomegaly


Psych: normal insight and normal affect/mood


MSK: no joint tenderness or swelling.





Objective





- Vital Signs/Intake and Output


Vital Signs (last 24 hours): 


                                        











Temp Pulse Resp BP Pulse Ox


 


 97.8 F   77   19   95/59 L  97 


 


 02/09/19 05:21  02/09/19 05:21  02/09/19 05:21  02/09/19 05:21  02/09/19 05:21








Intake and Output: 


                                        











 02/09/19 02/09/19





 06:59 18:59


 


Intake Total 0 


 


Output Total 201 


 


Balance -201 














- Medications


Medications: 


                               Current Medications





Albuterol/Ipratropium (Duoneb 3 Mg/0.5 Mg (3 Ml) Ud)  3 ml IH Q2H PRN


   PRN Reason: Shortness of Breath


Atorvastatin Calcium (Lipitor)  40 mg PO DIN [facility]


   Last Admin: 02/08/19 19:23 Dose:  40 mg


Calcium Acetate (Phoslo)  2,001 mg PO WM [facility]


   Last Admin: 02/08/19 19:43 Dose:  2,001 mg


Darbepoetin Balta (Aranesp)  100 mcg IVP QWK [facility]


   Last Admin: 02/05/19 13:50 Dose:  100 mcg


Ergocalciferol (Drisdol 50,000 Intl Units Cap)  1 cap PO Q7D [facility]


   Last Admin: 02/06/19 17:47 Dose:  1 cap


Indomethacin (Indocin)  50 mg PO TID PRN


   PRN Reason: pain


   Last Admin: 02/09/19 08:21 Dose:  50 mg


Metoclopramide HCl (Reglan)  10 mg IV ONCE PRN


   PRN Reason: Nausea/Vomiting


Metoprolol Succinate (Toprol Xl)  25 mg PO BRK [facility]


   Last Admin: 02/08/19 07:44 Dose:  25 mg


Nystatin (Mycostatin Cream)  0 ea TOP TID [facility]


   Last Admin: 02/08/19 19:44 Dose:  1 applic


Pantoprazole Sodium (Protonix Ec Tab)  40 mg PO 0600,1600 [facility]


Tamsulosin HCl (Flomax)  0.4 mg PO DAILY [facility]


   Last Admin: 02/08/19 09:45 Dose:  0.4 mg


Vitamin B Complex/Vit C/Folic Acid (Nephro-Seda)  1 tab PO 0800 [facility]


   Last Admin: 02/08/19 09:47 Dose:  1 tab











- Labs


Labs: 


                                        





                                 02/09/19 07:00 





                                 02/09/19 07:00 





                                        











PT  15.0 SECONDS (9.4-12.5)  H  02/06/19  06:00    


 


INR  1.33   02/06/19  06:00    


 


APTT  33.8 Seconds (26.9-38.3)   02/06/19  06:00
Subjective





- Date & Time of Evaluation


Date of Evaluation: 02/10/19


Time of Evaluation: 09:10





- Subjective


Subjective: 





Comfortable in bed, not in distress, afebrile.





Objective





- Vital Signs/Intake and Output


Vital Signs (last 24 hours): 


                                        











Temp Pulse Resp BP Pulse Ox


 


 98.4 F   83   20   102/54 L  94 L


 


 02/10/19 06:00  02/10/19 07:51  02/10/19 06:00  02/10/19 07:51  02/10/19 06:00








Intake and Output: 


                                        











 02/10/19 02/10/19





 06:59 18:59


 


Intake Total 240 


 


Output Total 100 


 


Balance 140 














- Medications


Medications: 


                               Current Medications





Albuterol/Ipratropium (Duoneb 3 Mg/0.5 Mg (3 Ml) Ud)  3 ml IH Q2H PRN


   PRN Reason: Shortness of Breath


Atorvastatin Calcium (Lipitor)  40 mg PO DIN [facility]


   Last Admin: 02/09/19 17:24 Dose:  40 mg


Calcium Acetate (Phoslo)  2,001 mg PO WM [facility]


   Last Admin: 02/10/19 07:51 Dose:  2,001 mg


Darbepoetin Balta (Aranesp)  100 mcg IVP QWK [facility]


   Last Admin: 02/05/19 13:50 Dose:  100 mcg


Ergocalciferol (Drisdol 50,000 Intl Units Cap)  1 cap PO Q7D [facility]


   Last Admin: 02/06/19 17:47 Dose:  1 cap


Indomethacin (Indocin)  50 mg PO TID PRN


   PRN Reason: pain


   Last Admin: 02/09/19 17:24 Dose:  50 mg


Metoclopramide HCl (Reglan)  10 mg IV ONCE PRN


   PRN Reason: Nausea/Vomiting


Metoprolol Succinate (Toprol Xl)  25 mg PO BRK [facility]


   Last Admin: 02/10/19 07:51 Dose:  Not Given


Nystatin (Mycostatin Cream)  0 ea TOP TID [facility]


   Last Admin: 02/10/19 09:06 Dose:  1 applic


Pantoprazole Sodium (Protonix Ec Tab)  40 mg PO 0600,1600 [facility]


   Last Admin: 02/10/19 05:06 Dose:  40 mg


Tamsulosin HCl (Flomax)  0.4 mg PO DAILY [facility]


   Last Admin: 02/10/19 09:05 Dose:  0.4 mg


Vitamin B Complex/Vit C/Folic Acid (Nephro-Seda)  1 tab PO 0800 [facility]


   Last Admin: 02/10/19 08:52 Dose:  1 tab











- Labs


Labs: 


                                        





                                 02/10/19 07:00 





                                 02/10/19 07:00 





                                        











PT  15.0 SECONDS (9.4-12.5)  H  02/06/19  06:00    


 


INR  1.33   02/06/19  06:00    


 


APTT  33.8 Seconds (26.9-38.3)   02/06/19  06:00    














- Constitutional


Appears: Chronically Ill





- Head Exam


Head Exam: NORMAL INSPECTION





- Respiratory Exam


Respiratory Exam: Decreased Breath Sounds





- Cardiovascular Exam


Cardiovascular Exam: +S1, +S2





- GI/Abdominal Exam


GI & Abdominal Exam: Soft.  absent: Tenderness





Assessment and Plan





- Assessment and Plan (Free Text)


Plan: 





Assessment


Systemic inflammatory response syndrome, probably due to acute renal failure 

from left sided obstructive uropathy S/P left ureteral stent placement R/O acute

coronary syndrome in this patient presenting with dehydration and metabolic 

acidosis, R/O sepsis but so far no evidence identified


arthritis


chronic right leg rash


S/P hip surgery





Plan


cultures and repeat cultures have been negative - will continue to monitor off 

antibiotics since he is at risk for nosocomial infections
Subjective





- Date & Time of Evaluation


Date of Evaluation: 02/10/19


Time of Evaluation: 13:07





- Subjective


Subjective: 





Nephrology Consultation Note:





Assessment: critical


Acute Kidney Injury (N17.9) with uremia ? etiology (NSAIDs use, obs uropathy 

mild left hydro with calculus) started dialysis 2/5/19


Anemia (D64.9), Hyperphosphatemia (E83.39) hyperkalemia, vit D def, sec 

hyperparathyroidism


HAGMA with respi compensation


obesity


ex smoker


CKD 3 with baseline cr 2.2 in 2016


elevated troponins


s/p left ureteral stent by Dr López 2/7/19


? paraprotein





Plan


next tx tuesday


consult IR for permacath - order placed discussed w/ rn


on phos binder, nephrovite and weekly aransep. PRBC as needed vitamin d


f/u gu


+ SIFE w/ + Lambda Light chain  - serum Free ligth chain assay is pending [facility] 

hematology eval














s: feels ok no n/v











Physical Examination:      


General Appearance: better appearing co-operative . 


Vitals reviewed and noted as below


Head; Atraumatic, normocephalic


ENT: no ulcers


EYES: Eye muscles and extraocular movement intact. Sclera is anicteric.


Neck; supple no jvd


Lungs: normal respiratory rate/effort. Breath sounds bilateral equal and clear


Heart: Increased rate. s1s2 normal. No rub or gallop. 


Extremities: no edema. No varicose veins. left hand swelling +


Neurological: Patient is alert, awake and oriented to person, place and time. No

focal deficit. Strength bilateral appropriate and equal


Skin: Warm and dry. Normal turgor. 


Abdomen: Abdomen is soft. Bowel sounds +. There is no abdominal tenderness, no 

guarding/rigidity no organomegaly


Psych: normal insight and normal affect/mood


MSK: no joint tenderness or swelling.





Objective





- Vital Signs/Intake and Output


Vital Signs (last 24 hours): 


                                        











Temp Pulse Resp BP Pulse Ox


 


 98.7 F   71   19   105/53 L  94 L


 


 02/10/19 12:00  02/10/19 12:00  02/10/19 12:00  02/10/19 12:00  02/10/19 06:00








Intake and Output: 


                                        











 02/10/19 02/10/19





 06:59 18:59


 


Intake Total 240 


 


Output Total 100 


 


Balance 140 














- Medications


Medications: 


                               Current Medications





Albuterol/Ipratropium (Duoneb 3 Mg/0.5 Mg (3 Ml) Ud)  3 ml IH Q2H PRN


   PRN Reason: Shortness of Breath


Atorvastatin Calcium (Lipitor)  40 mg PO DIN [facility]


   Last Admin: 02/09/19 17:24 Dose:  40 mg


Calcium Acetate (Phoslo)  2,001 mg PO WM [facility]


   Last Admin: 02/10/19 11:41 Dose:  2,001 mg


Darbepoetin Balta (Aranesp)  100 mcg IVP QWK [facility]


   Last Admin: 02/05/19 13:50 Dose:  100 mcg


Ergocalciferol (Drisdol 50,000 Intl Units Cap)  1 cap PO Q7D [facility]


   Last Admin: 02/06/19 17:47 Dose:  1 cap


Indomethacin (Indocin)  50 mg PO TID PRN


   PRN Reason: pain


   Last Admin: 02/09/19 17:24 Dose:  50 mg


Metoclopramide HCl (Reglan)  10 mg IV ONCE PRN


   PRN Reason: Nausea/Vomiting


Metoprolol Succinate (Toprol Xl)  25 mg PO BRK [facility]


   Last Admin: 02/10/19 07:51 Dose:  Not Given


Nystatin (Mycostatin Cream)  0 ea TOP TID [facility]


   Last Admin: 02/10/19 09:06 Dose:  1 applic


Pantoprazole Sodium (Protonix Ec Tab)  40 mg PO 0600,1600 [facility]


   Last Admin: 02/10/19 05:06 Dose:  40 mg


Tamsulosin HCl (Flomax)  0.4 mg PO DAILY [facility]


   Last Admin: 02/10/19 09:05 Dose:  0.4 mg


Vitamin B Complex/Vit C/Folic Acid (Nephro-Seda)  1 tab PO 0800 [facility]


   Last Admin: 02/10/19 08:52 Dose:  1 tab











- Labs


Labs: 


                                        





                                 02/10/19 07:00 





                                 02/10/19 07:00 





                                        











PT  15.0 SECONDS (9.4-12.5)  H  02/06/19  06:00    


 


INR  1.33   02/06/19  06:00    


 


APTT  33.8 Seconds (26.9-38.3)   02/06/19  06:00
Subjective





- Date & Time of Evaluation


Date of Evaluation: 02/11/19


Time of Evaluation: 10:30





- Subjective


Subjective: 





Comfortable on a chair, no fevers.





Objective





- Vital Signs/Intake and Output


Vital Signs (last 24 hours): 


                                        











Temp Pulse Resp BP Pulse Ox


 


 98.7 F   71   19   105/53 L  94 L


 


 02/10/19 12:00  02/10/19 14:00  02/10/19 12:00  02/10/19 12:00  02/10/19 06:00








Intake and Output: 


                                        











 02/10/19 02/10/19





 06:59 18:59


 


Intake Total 240 


 


Output Total 100 


 


Balance 140 














- Medications


Medications: 


                               Current Medications





Albuterol/Ipratropium (Duoneb 3 Mg/0.5 Mg (3 Ml) Ud)  3 ml IH Q2H PRN


   PRN Reason: Shortness of Breath


Atorvastatin Calcium (Lipitor)  40 mg PO DIN [facility]


   Last Admin: 02/09/19 17:24 Dose:  40 mg


Calcium Acetate (Phoslo)  2,001 mg PO WM [facility]


   Last Admin: 02/10/19 11:41 Dose:  2,001 mg


Darbepoetin Balta (Aranesp)  100 mcg IVP QWK [facility]


   Last Admin: 02/05/19 13:50 Dose:  100 mcg


Ergocalciferol (Drisdol 50,000 Intl Units Cap)  1 cap PO Q7D [facility]


   Last Admin: 02/06/19 17:47 Dose:  1 cap


Indomethacin (Indocin)  50 mg PO TID PRN


   PRN Reason: pain


   Last Admin: 02/09/19 17:24 Dose:  50 mg


Metoclopramide HCl (Reglan)  10 mg IV ONCE PRN


   PRN Reason: Nausea/Vomiting


Metoprolol Succinate (Toprol Xl)  25 mg PO BRK [facility]


   Last Admin: 02/10/19 07:51 Dose:  Not Given


Nystatin (Mycostatin Cream)  0 ea TOP TID [facility]


   Last Admin: 02/10/19 09:06 Dose:  1 applic


Pantoprazole Sodium (Protonix Ec Tab)  40 mg PO 0600,1600 [facility]


   Last Admin: 02/10/19 05:06 Dose:  40 mg


Tamsulosin HCl (Flomax)  0.4 mg PO DAILY [facility]


   Last Admin: 02/10/19 09:05 Dose:  0.4 mg


Vitamin B Complex/Vit C/Folic Acid (Nephro-Seda)  1 tab PO 0800 [facility]


   Last Admin: 02/10/19 08:52 Dose:  1 tab











- Labs


Labs: 


                                        





                                 02/10/19 07:00 





                                 02/10/19 07:00 





                                        











PT  15.0 SECONDS (9.4-12.5)  H  02/06/19  06:00    


 


INR  1.33   02/06/19  06:00    


 


APTT  33.8 Seconds (26.9-38.3)   02/06/19  06:00    














- Constitutional


Appears: Chronically Ill





- Head Exam


Head Exam: NORMAL INSPECTION





- Respiratory Exam


Respiratory Exam: Decreased Breath Sounds





- Cardiovascular Exam


Cardiovascular Exam: +S1, +S2





- GI/Abdominal Exam


GI & Abdominal Exam: Soft.  absent: Tenderness





Assessment and Plan





- Assessment and Plan (Free Text)


Plan: 








Assessment


Systemic inflammatory response syndrome, probably due to acute renal failure 

from left sided obstructive uropathy S/P left ureteral stent placement R/O acute

coronary syndrome in this patient presenting with dehydration and metabolic 

acidosis, no evidence of sepsis or infection identified


arthritis


chronic right leg rash


S/P hip surgery





Plan


cultures and repeat cultures have been negative - will continue to monitor off 

antibiotics since he is at risk for hospital-acquired infections and will 

continue to trend WBC count
Subjective





- Date & Time of Evaluation


Date of Evaluation: 02/11/19


Time of Evaluation: 13:49





- Subjective


Subjective: 





GI Dr. Soria





Pt seen and examined. No acute events overnight. Pt is resting comfortably in 

bed. He has no abdominal pain. No N/V. He reports normal bowel movements. 





Objective





- Vital Signs/Intake and Output


Vital Signs (last 24 hours): 


                                        











Temp Pulse Resp BP Pulse Ox


 


 98.1 F   85   18   110/65   94 L


 


 02/11/19 12:00  02/11/19 12:00  02/11/19 12:00  02/11/19 12:00  02/11/19 00:01








Intake and Output: 


                                        











 02/11/19 02/11/19





 06:59 18:59


 


Intake Total 960 


 


Output Total 675 


 


Balance 285 














- Medications


Medications: 


                               Current Medications





Albuterol/Ipratropium (Duoneb 3 Mg/0.5 Mg (3 Ml) Ud)  3 ml IH Q2H PRN


   PRN Reason: Shortness of Breath


Atorvastatin Calcium (Lipitor)  40 mg PO DIN [facility]


   Last Admin: 02/10/19 17:09 Dose:  40 mg


Calcium Acetate (Phoslo)  2,001 mg PO WM [facility]


   Last Admin: 02/11/19 08:57 Dose:  2,001 mg


Darbepoetin Balta (Aranesp)  100 mcg IVP QWK [facility]


   Last Admin: 02/05/19 13:50 Dose:  100 mcg


Ergocalciferol (Drisdol 50,000 Intl Units Cap)  1 cap PO Q7D [facility]


   Last Admin: 02/06/19 17:47 Dose:  1 cap


Metoprolol Succinate (Toprol Xl)  25 mg PO BRK [facility]


   Last Admin: 02/11/19 08:59 Dose:  25 mg


Nystatin (Mycostatin Cream)  0 ea TOP TID [facility]


   Last Admin: 02/10/19 17:11 Dose:  1 applic


Pantoprazole Sodium (Protonix Ec Tab)  40 mg PO 0600,1600 [facility]


   Last Admin: 02/11/19 05:08 Dose:  40 mg


Tamsulosin HCl (Flomax)  0.4 mg PO DAILY [facility]


   Last Admin: 02/11/19 11:15 Dose:  0.4 mg


Vitamin B Complex/Vit C/Folic Acid (Nephro-Seda)  1 tab PO 0800 [facility]


   Last Admin: 02/11/19 08:59 Dose:  1 tab











- Labs


Labs: 


                                        





                                 02/11/19 06:00 





                                 02/11/19 06:00 





                                        











PT  15.0 SECONDS (9.4-12.5)  H  02/06/19  06:00    


 


INR  1.33   02/06/19  06:00    


 


APTT  33.8 Seconds (26.9-38.3)   02/06/19  06:00    














- Constitutional


Appears: Non-toxic, No Acute Distress





- Head Exam


Head Exam: ATRAUMATIC, NORMOCEPHALIC





- Eye Exam


Eye Exam: EOMI





- ENT Exam


ENT Exam: Mucous Membranes Moist





- Neck Exam


Neck Exam: Full ROM





- Respiratory Exam


Respiratory Exam: NORMAL BREATHING PATTERN.  absent: Accessory Muscle Use, 

Respiratory Distress





- GI/Abdominal Exam


GI & Abdominal Exam: Soft.  absent: Distended, Firm, Guarding, Rigid, Tenderness





- Extremities Exam


Extremities Exam: absent: Calf Tenderness





- Neurological Exam


Neurological Exam: Alert, Awake





- Psychiatric Exam


Psychiatric exam: Normal Affect, Normal Mood





Assessment and Plan





- Assessment and Plan (Free Text)


Assessment: 





68M w. ROXANE 2/2 obstructive uropathy, and anemia r/o GI bleed


-f/u stool occult blood


-c/w PPIs


-trend h/h, transfuse prn for hgb >8


-will benefit from EGD when medically optimized


-will continue to follow


-d/w attending





Radha PGY4
Subjective





- Date & Time of Evaluation


Date of Evaluation: 02/11/19


Time of Evaluation: 14:06





- Subjective


Subjective: 





Nephrology Consultation Note:





Assessment: critical


Acute Kidney Injury (N17.9) with uremia ? etiology (NSAIDs use, obs uropathy 

mild left hydro with calculus) started dialysis 2/5/19


Anemia (D64.9), Hyperphosphatemia (E83.39) hyperkalemia, vit D def, sec 

hyperparathyroidism


HAGMA with respi compensation


obesity


ex smoker


CKD 3 with baseline cr 2.2 in 2016


elevated troponins


s/p left ureteral stent by Dr López 2/7/19


? paraprotein





Plan


HD tomorrow


pt was refusing permacath but after long discussion states he is ok with it


on phos binder, nephrovite and weekly aransep.


f/u gu


+ SIFE w/ + Lambda Light chain  -SFLC normal -  f/u heme onc work up


bp stable


lytes otherwsie stable














s: feels ok no n/v











Physical Examination:      


General Appearance: better appearing co-operative . 


Vitals reviewed and noted as below


Head; Atraumatic, normocephalic


ENT: no ulcers


EYES: Eye muscles and extraocular movement intact. Sclera is anicteric.


Neck; supple no jvd


Lungs: normal respiratory rate/effort. Breath sounds bilateral equal and clear


Heart: Increased rate. s1s2 normal. No rub or gallop. 


Extremities: no edema. No varicose veins. left hand swelling +


Neurological: Patient is alert, awake and oriented to person, place and time. No

focal deficit. Strength bilateral appropriate and equal


Skin: Warm and dry. Normal turgor. 


Abdomen: Abdomen is soft. Bowel sounds +. There is no abdominal tenderness, no 

guarding/rigidity no organomegaly


Psych: normal insight and normal affect/mood


MSK: no joint tenderness or swelling.








Objective





- Vital Signs/Intake and Output


Vital Signs (last 24 hours): 


                                        











Temp Pulse Resp BP Pulse Ox


 


 98.1 F   85   18   110/65   94 L


 


 02/11/19 12:00  02/11/19 12:00  02/11/19 12:00  02/11/19 12:00  02/11/19 00:01








Intake and Output: 


                                        











 02/11/19 02/11/19





 06:59 18:59


 


Intake Total 960 


 


Output Total 675 


 


Balance 285 














- Medications


Medications: 


                               Current Medications





Albuterol/Ipratropium (Duoneb 3 Mg/0.5 Mg (3 Ml) Ud)  3 ml IH Q2H PRN


   PRN Reason: Shortness of Breath


Atorvastatin Calcium (Lipitor)  40 mg PO DIN [facility]


   Last Admin: 02/10/19 17:09 Dose:  40 mg


Calcium Acetate (Phoslo)  2,001 mg PO WM [facility]


   Last Admin: 02/11/19 08:57 Dose:  2,001 mg


Darbepoetin Balta (Aranesp)  100 mcg IVP QWK [facility]


   Last Admin: 02/05/19 13:50 Dose:  100 mcg


Ergocalciferol (Drisdol 50,000 Intl Units Cap)  1 cap PO Q7D [facility]


   Last Admin: 02/06/19 17:47 Dose:  1 cap


Metoprolol Succinate (Toprol Xl)  25 mg PO BRK [facility]


   Last Admin: 02/11/19 08:59 Dose:  25 mg


Nystatin (Mycostatin Cream)  0 ea TOP TID [facility]


   Last Admin: 02/10/19 17:11 Dose:  1 applic


Pantoprazole Sodium (Protonix Ec Tab)  40 mg PO 0600,1600 [facility]


   Last Admin: 02/11/19 05:08 Dose:  40 mg


Tamsulosin HCl (Flomax)  0.4 mg PO DAILY [facility]


   Last Admin: 02/11/19 11:15 Dose:  0.4 mg


Vitamin B Complex/Vit C/Folic Acid (Nephro-Seda)  1 tab PO 0800 [facility]


   Last Admin: 02/11/19 08:59 Dose:  1 tab











- Labs


Labs: 


                                        





                                 02/11/19 06:00 





                                 02/11/19 06:00 





                                        











PT  15.0 SECONDS (9.4-12.5)  H  02/06/19  06:00    


 


INR  1.33   02/06/19  06:00    


 


APTT  33.8 Seconds (26.9-38.3)   02/06/19  06:00
Subjective





- Date & Time of Evaluation


Date of Evaluation: 02/12/19


Time of Evaluation: 08:45





- Subjective


Subjective: 





Patient for dialysis catheter placement today, no fevers.





Objective





- Vital Signs/Intake and Output


Vital Signs (last 24 hours): 


                                        











Temp Pulse Resp BP Pulse Ox


 


 98.1 F   85   18   110/65   94 L


 


 02/11/19 12:00  02/11/19 12:00  02/11/19 12:00  02/11/19 12:00  02/11/19 00:01








Intake and Output: 


                                        











 02/11/19 02/11/19





 06:59 18:59


 


Intake Total 960 


 


Output Total 675 


 


Balance 285 














- Medications


Medications: 


                               Current Medications





Albuterol/Ipratropium (Duoneb 3 Mg/0.5 Mg (3 Ml) Ud)  3 ml IH Q2H PRN


   PRN Reason: Shortness of Breath


Atorvastatin Calcium (Lipitor)  40 mg PO DIN [facility]


   Last Admin: 02/10/19 17:09 Dose:  40 mg


Calcium Acetate (Phoslo)  2,001 mg PO WM [facility]


   Last Admin: 02/11/19 08:57 Dose:  2,001 mg


Darbepoetin Balta (Aranesp)  100 mcg IVP QWK [facility]


   Last Admin: 02/05/19 13:50 Dose:  100 mcg


Ergocalciferol (Drisdol 50,000 Intl Units Cap)  1 cap PO Q7D [facility]


   Last Admin: 02/06/19 17:47 Dose:  1 cap


Metoprolol Succinate (Toprol Xl)  25 mg PO BRK [facility]


   Last Admin: 02/11/19 08:59 Dose:  25 mg


Nystatin (Mycostatin Cream)  0 ea TOP TID [facility]


   Last Admin: 02/10/19 17:11 Dose:  1 applic


Pantoprazole Sodium (Protonix Ec Tab)  40 mg PO 0600,1600 [facility]


   Last Admin: 02/11/19 05:08 Dose:  40 mg


Tamsulosin HCl (Flomax)  0.4 mg PO DAILY [facility]


   Last Admin: 02/11/19 11:15 Dose:  0.4 mg


Vitamin B Complex/Vit C/Folic Acid (Nephro-Seda)  1 tab PO 0800 [facility]


   Last Admin: 02/11/19 08:59 Dose:  1 tab











- Labs


Labs: 


                                        





                                 02/11/19 06:00 





                                 02/11/19 06:00 





                                        











PT  15.0 SECONDS (9.4-12.5)  H  02/06/19  06:00    


 


INR  1.33   02/06/19  06:00    


 


APTT  33.8 Seconds (26.9-38.3)   02/06/19  06:00    














- Constitutional


Appears: Chronically Ill





- Head Exam


Head Exam: NORMAL INSPECTION





- Respiratory Exam


Respiratory Exam: Decreased Breath Sounds





- Cardiovascular Exam


Cardiovascular Exam: +S1, +S2





- GI/Abdominal Exam


GI & Abdominal Exam: Soft.  absent: Tenderness





Assessment and Plan





- Assessment and Plan (Free Text)


Plan: 


Assessment


Systemic inflammatory response syndrome, probably due to acute renal failure 

from left sided obstructive uropathy S/P left ureteral stent placement R/O acute

coronary syndrome in this patient presenting with dehydration and metabolic 

acidosis, no evidence of sepsis or infection identified - patient now with need 

for chronic hemodialysis


arthritis


chronic right leg rash


S/P hip surgery





Plan


cultures and repeat cultures have been negative - will continue to monitor off 

antibiotics since he is at risk for healthcare-associated infections and will 

continue to trend WBC count
Subjective





- Date & Time of Evaluation


Date of Evaluation: 02/13/19


Time of Evaluation: 09:45





- Subjective


Subjective: 





Comfortable in bed, afebrile.





Objective





- Vital Signs/Intake and Output


Vital Signs (last 24 hours): 


                                        











Temp Pulse Resp BP Pulse Ox


 


 98 F   70   14   105/59 L  96 


 


 02/12/19 10:00  02/12/19 10:00  02/12/19 10:00  02/12/19 10:00  02/12/19 10:00








Intake and Output: 


                                        











 02/12/19 02/12/19





 06:59 18:59


 


Intake Total 1320 


 


Output Total 850 


 


Balance 470 














- Medications


Medications: 


                               Current Medications





Albuterol/Ipratropium (Duoneb 3 Mg/0.5 Mg (3 Ml) Ud)  3 ml IH Q2H PRN


   PRN Reason: Shortness of Breath


Atorvastatin Calcium (Lipitor)  40 mg PO DIN [facility]


   Last Admin: 02/11/19 18:16 Dose:  40 mg


Calcium Acetate (Phoslo)  2,001 mg PO WM [facility]


   Last Admin: 02/11/19 18:16 Dose:  2,001 mg


Darbepoetin Balta (Aranesp)  100 mcg SC WED [facility]


Ergocalciferol (Drisdol 50,000 Intl Units Cap)  1 cap PO Q7D [facility]


   Last Admin: 02/06/19 17:47 Dose:  1 cap


Iron Sucrose 100 mg/ Sodium (Chloride)  105 mls @ 210 mls/hr IVPB DAILY [facility]


   Stop: 02/15/19 10:00


Metoprolol Succinate (Toprol Xl)  25 mg PO BRK [facility]


   Last Admin: 02/11/19 08:59 Dose:  25 mg


Nystatin (Mycostatin Cream)  0 ea TOP TID [facility]


   Last Admin: 02/11/19 18:17 Dose:  Not Given


Pantoprazole Sodium (Protonix Ec Tab)  40 mg PO 0600,1600 [facility]


   Last Admin: 02/12/19 05:28 Dose:  Not Given


Tamsulosin HCl (Flomax)  0.4 mg PO DAILY [facility]


   Last Admin: 02/11/19 11:15 Dose:  0.4 mg


Vitamin B Complex/Vit C/Folic Acid (Nephro-Seda)  1 tab PO 0800 [facility]


   Last Admin: 02/11/19 08:59 Dose:  1 tab











- Labs


Labs: 


                                        





                                 02/12/19 05:30 





                                 02/12/19 05:30 





                                        











PT  15.0 SECONDS (9.4-12.5)  H  02/06/19  06:00    


 


INR  1.33   02/06/19  06:00    


 


APTT  33.8 Seconds (26.9-38.3)   02/06/19  06:00    














- Constitutional


Appears: Non-toxic, Chronically Ill





- Head Exam


Head Exam: NORMAL INSPECTION





- Respiratory Exam


Respiratory Exam: Decreased Breath Sounds





- Cardiovascular Exam


Cardiovascular Exam: +S1, +S2





- GI/Abdominal Exam


GI & Abdominal Exam: Soft.  absent: Tenderness





Assessment and Plan





- Assessment and Plan (Free Text)


Plan: 





Assessment


S/P systemic inflammatory response syndrome, probably due to acute renal failure

from left sided obstructive uropathy S/P left ureteral stent placement R/O acute

coronary syndrome in this patient presenting with dehydration and metabolic 

acidosis, no evidence of sepsis or infection identified - patient now with need 

for chronic hemodialysis


arthritis


chronic right leg rash


S/P hip surgery





Plan


cultures and repeat cultures have been negative - will continue to monitor off 

antibiotics since he is at risk for nosocomial infections and will continue to 

trend WBC count (continues to trend downward)
Subjective





- Date & Time of Evaluation


Date of Evaluation: 02/13/19


Time of Evaluation: 11:00





- Subjective


Subjective: 





Nephrology Consultation Note:





Assessment: 


Acute Kidney Injury (N17.9) with uremia ? etiology (NSAIDs use, obs uropathy 

mild left hydro with calculus) started dialysis 2/5/19


Anemia (D64.9), Hyperphosphatemia (E83.39) hyperkalemia, vit D def, sec 

hyperparathyroidism


HAGMA with respi compensation


obesity


ex smoker


CKD 3 with baseline cr 2.2 in 2016


elevated troponins


s/p left ureteral stent by Dr López 2/7/19


? paraprotein





Plan


HD TTS schedule, permacath placed


lytes reviewed


on phos binder, nephrovite and weekly aransep.


+ SIFE w/ + Lambda Light chain  -SFLC normal -  f/u heme onc work up


bp stable


lytes otherwsie stable


for discharge to [facility] with hd there, wants to be switched to [place] 

hd unit after being discharged from the rehab 


jovel- per gu, can attempt voiding trial 








Physical Examination:      


General Appearance: better appearing co-operative . 


Vitals reviewed and noted as below


Head; Atraumatic, normocephalic


ENT: no ulcers


EYES: Eye muscles and extraocular movement intact. Sclera is anicteric.


Neck; supple no jvd


Lungs: normal respiratory rate/effort. Breath sounds bilateral equal and clear


Heart: Increased rate. s1s2 normal. No rub or gallop. 


Extremities: no edema. No varicose veins. left hand swelling +


Neurological: Patient is alert, awake and oriented to person, place and time. No

focal deficit. Strength bilateral appropriate and equal


Skin: Warm and dry. Normal turgor. 


Abdomen: Abdomen is soft. Bowel sounds +. There is no abdominal tenderness, no 

guarding/rigidity no organomegaly


Psych: normal insight and normal affect/mood





Objective





- Vital Signs/Intake and Output


Vital Signs (last 24 hours): 


                                        











Temp Pulse Resp BP Pulse Ox


 


 98.1 F   90   18   102/65   100 


 


 02/13/19 12:00  02/13/19 12:00  02/13/19 12:00  02/13/19 12:00  02/13/19 06:00








Intake and Output: 


                                        











 02/13/19 02/14/19





 18:59 06:59


 


Intake Total  860


 


Balance  860














- Labs


Labs: 


                                        





                                 02/13/19 05:30 





                                 02/13/19 05:30 





                                        











PT  15.0 SECONDS (9.4-12.5)  H  02/06/19  06:00    


 


INR  1.33   02/06/19  06:00    


 


APTT  33.8 Seconds (26.9-38.3)   02/06/19  06:00
Ambulatory